# Patient Record
Sex: FEMALE | Race: WHITE | Employment: FULL TIME | URBAN - METROPOLITAN AREA
[De-identification: names, ages, dates, MRNs, and addresses within clinical notes are randomized per-mention and may not be internally consistent; named-entity substitution may affect disease eponyms.]

---

## 2017-03-13 ENCOUNTER — OFFICE VISIT (OUTPATIENT)
Dept: URGENT CARE | Facility: URGENT CARE | Age: 57
End: 2017-03-13
Payer: COMMERCIAL

## 2017-03-13 VITALS
DIASTOLIC BLOOD PRESSURE: 88 MMHG | OXYGEN SATURATION: 100 % | TEMPERATURE: 98.2 F | HEART RATE: 84 BPM | HEIGHT: 67 IN | BODY MASS INDEX: 26.53 KG/M2 | WEIGHT: 169 LBS | RESPIRATION RATE: 20 BRPM | SYSTOLIC BLOOD PRESSURE: 156 MMHG

## 2017-03-13 DIAGNOSIS — J01.01 ACUTE RECURRENT MAXILLARY SINUSITIS: ICD-10-CM

## 2017-03-13 DIAGNOSIS — J45.901 ASTHMA EXACERBATION: Primary | ICD-10-CM

## 2017-03-13 PROCEDURE — 99203 OFFICE O/P NEW LOW 30 MIN: CPT | Performed by: INTERNAL MEDICINE

## 2017-03-13 RX ORDER — LORATADINE 10 MG/1
10 TABLET ORAL DAILY
COMMUNITY
End: 2017-08-31 | Stop reason: ALTCHOICE

## 2017-03-13 RX ORDER — CEFDINIR 300 MG/1
300 CAPSULE ORAL 2 TIMES DAILY
Qty: 20 CAPSULE | Refills: 0 | Status: SHIPPED | OUTPATIENT
Start: 2017-03-13 | End: 2017-04-04

## 2017-03-13 RX ORDER — ALBUTEROL SULFATE 0.83 MG/ML
1 SOLUTION RESPIRATORY (INHALATION) EVERY 6 HOURS PRN
COMMUNITY
End: 2017-08-31

## 2017-03-13 RX ORDER — METHYLPREDNISOLONE 4 MG
TABLET, DOSE PACK ORAL
Qty: 21 TABLET | Refills: 0 | Status: SHIPPED | OUTPATIENT
Start: 2017-03-13 | End: 2017-04-04

## 2017-03-13 RX ORDER — IPRATROPIUM BROMIDE 42 UG/1
2 SPRAY, METERED NASAL 4 TIMES DAILY PRN
Qty: 1 BOX | Refills: 0 | Status: SHIPPED | OUTPATIENT
Start: 2017-03-13 | End: 2018-01-09

## 2017-03-13 ASSESSMENT — ENCOUNTER SYMPTOMS
SORE THROAT: 1
CHILLS: 0
SHORTNESS OF BREATH: 1
SINUS PRESSURE: 1
SPUTUM PRODUCTION: 1
HEMOPTYSIS: 0
WHEEZING: 1
COUGH: 1
FEVER: 0

## 2017-03-13 NOTE — MR AVS SNAPSHOT
After Visit Summary   3/13/2017    Ophelia Caal    MRN: [id number]           Patient Information     Date Of Birth          1960        Visit Information        Provider Department      3/13/2017 5:15 PM Tor Barrios MD [facility]        Today's Diagnoses     Asthma exacerbation    -  1    Acute recurrent maxillary sinusitis          Care Instructions    Establish with a regular medical provider and follow up if your symptoms persist/worsens, or if you develop any new symptoms or side effects from the medication/s.        Follow-ups after your visit        Your next 10 appointments already scheduled     Mar 14, 2017 12:20 PM CDT   SHORT with Corrine Kovacs MD   [facility]    [address] [place] [address]  [address]  [facility] [address] [phone]            Apr 27, 2017  9:45 AM CDT   PHYSICAL with Radha Akhtar MD   [facility] ([facility]    [address] [place] [address]  [address]  [facility] [address] [phone]              Who to contact     If you have questions or need follow up information about today's clinic visit or your schedule please contact [facility] directly at [phone].  Normal or non-critical lab and imaging results will be communicated to you by Holograamhart, letter or phone within 4 business days after the clinic has received the results. If you do not hear from us within 7 days, please contact the clinic through Holograamhart or phone. If you have a critical or abnormal lab result, we will notify you by phone as soon as possible.  Submit refill requests through DanceOn or call your pharmacy and they will forward the refill request to us. Please allow 3 business days for your refill to be completed.          Additional Information About Your Visit        Holograamhart Information     DanceOn lets you send messages to your doctor, view your test  "results, renew your prescriptions, schedule appointments and more. To sign up, go to [email or web address] . Click on \"Log in\" on the left side of the screen, which will take you to the Welcome page. Then click on \"Sign up Now\" on the right side of the page.     You will be asked to enter the access code listed below, as well as some personal information. Please follow the directions to create your username and password.     Your access code is: [email or web address]  Expires: 2017  5:28 PM     Your access code will  in 90 days. If you need help or a new code, please call your [place] clinic or [phone].        Care EveryWhere ID     This is your Care EveryWhere ID. This could be used by other organizations to access your [place] medical records  [id number]        Your Vitals Were     Pulse Temperature Respirations Height Pulse Oximetry BMI (Body Mass Index)    84 98.2  F (36.8  C) 20 5' 7\" (1.702 m) 100% 26.47 kg/m2       Blood Pressure from Last 3 Encounters:   17 156/88    Weight from Last 3 Encounters:   17 169 lb (76.7 kg)              Today, you had the following     No orders found for display         Today's Medication Changes          These changes are accurate as of: 3/13/17  5:28 PM.  If you have any questions, ask your nurse or doctor.               Start taking these medicines.        Dose/Directions    cefdinir 300 MG capsule   Commonly known as:  OMNICEF   Used for:  Acute recurrent maxillary sinusitis   Started by:  Tor Barrios MD        Dose:  300 mg   Take 1 capsule (300 mg) by mouth 2 times daily   Quantity:  20 capsule   Refills:  0       ipratropium 0.06 % spray   Commonly known as:  ATROVENT   Used for:  Acute recurrent maxillary sinusitis   Started by:  Tor Barrios MD        Dose:  2 spray   Spray 2 sprays into both nostrils 4 times daily as needed for rhinitis   Quantity:  1 Box   Refills:  0       methylPREDNISolone 4 MG " tablet   Commonly known as:  MEDROL DOSEPAK   Used for:  Asthma exacerbation   Started by:  Tor Barrios MD        Follow package instructions   Quantity:  21 tablet   Refills:  0            Where to get your medicines      These medications were sent to [id number]  [place] - [address]  [address] [address]     Phone:  [phone]     cefdinir 300 MG capsule    ipratropium 0.06 % spray    methylPREDNISolone 4 MG tablet                Primary Care Provider    None       No address on file        Thank you!     Thank you for choosing [facility]  for your care. Our goal is always to provide you with excellent care. Hearing back from our patients is one way we can continue to improve our services. Please take a few minutes to complete the written survey that you may receive in the mail after your visit with us. Thank you!             Your Updated Medication List - Protect others around you: Learn how to safely use, store and throw away your medicines at www.disposemymeds.org.          This list is accurate as of: 3/13/17  5:28 PM.  Always use your most recent med list.                   Brand Name Dispense Instructions for use    albuterol (2.5 MG/3ML) 0.083% neb solution      Take 1 vial by nebulization every 6 hours as needed for shortness of breath / dyspnea or wheezing       cefdinir 300 MG capsule    OMNICEF    20 capsule    Take 1 capsule (300 mg) by mouth 2 times daily       ipratropium 0.06 % spray    ATROVENT    1 Box    Spray 2 sprays into both nostrils 4 times daily as needed for rhinitis       loratadine 10 MG tablet    CLARITIN     Take 10 mg by mouth daily       methylPREDNISolone 4 MG tablet    MEDROL DOSEPAK    21 tablet    Follow package instructions       VITAMIN D (CHOLECALCIFEROL) PO      Take by mouth daily

## 2017-03-13 NOTE — PROGRESS NOTES
"HPI Comments:   Patient has a history of chronic allergic rhinitis that has not been responsive to nasal steroids, oral antihistamines.    Also has history of asthma; used to be on Advair but stopped.  Asthma was mainly exercise-induced until this course of present illness.    Sinus Problem    This is a new problem. Episode onset: 1 week. The problem has been gradually worsening. There has been no fever. The pain is mild. Associated symptoms include congestion, sinus pressure, sore throat, cough and shortness of breath. Pertinent negatives include no chills and no ear pain.       Past medical history: allergic rhinitis, asthma      Review of Systems   Constitutional: Negative for chills and fever.   HENT: Positive for congestion, sinus pressure and sore throat. Negative for ear pain.    Respiratory: Positive for cough, sputum production, shortness of breath and wheezing. Negative for hemoptysis.        /88  Pulse 84  Temp 98.2  F (36.8  C)  Resp 20  Ht 5' 7\" (1.702 m)  Wt 169 lb (76.7 kg)  SpO2 100%  BMI 26.47 kg/m2      Physical Exam   Constitutional: She is oriented to person, place, and time. No distress.   HENT:   Mouth/Throat: Oropharynx is clear and moist. No oropharyngeal exudate.   Severe nasal congestion   Cardiovascular: Normal rate, regular rhythm and normal heart sounds.    Pulmonary/Chest: Effort normal and breath sounds normal. No respiratory distress.   Lymphadenopathy:     She has no cervical adenopathy.   Neurological: She is alert and oriented to person, place, and time. GCS score is 15.   Vitals reviewed.        ICD-10-CM    1. Asthma exacerbation J45.901 methylPREDNISolone (MEDROL DOSEPAK) 4 MG tablet   2. Acute recurrent maxillary sinusitis J01.01 cefdinir (OMNICEF) 300 MG capsule     ipratropium (ATROVENT) 0.06 % spray       Patient Instructions   Establish with a regular medical provider and follow up if your symptoms persist/worsens, or if you develop any new symptoms or side effects " from the medication/s.

## 2017-03-13 NOTE — PATIENT INSTRUCTIONS
Establish with a regular medical provider and follow up if your symptoms persist/worsens, or if you develop any new symptoms or side effects from the medication/s.

## 2017-03-13 NOTE — NURSING NOTE
"Chief Complaint   Patient presents with     Urgent Care     Sinus Problem     pressure and drainage for over a month     Shortness of Breath     Cough      Initial /88  Pulse 84  Temp 98.2  F (36.8  C)  Resp 20  Ht 5' 7\" (1.702 m)  Wt 169 lb (76.7 kg)  SpO2 100%  BMI 26.47 kg/m2 Estimated body mass index is 26.47 kg/(m^2) as calculated from the following:    Height as of this encounter: 5' 7\" (1.702 m).    Weight as of this encounter: 169 lb (76.7 kg)..  BP completed using cuff size: regular  S KIMBERLY, CMA      "

## 2017-03-13 NOTE — PROGRESS NOTES
Sinus Problem    This is a new problem.         Past medical history: asthma      ROS      Physical Exam

## 2017-04-04 ENCOUNTER — OFFICE VISIT (OUTPATIENT)
Dept: PEDIATRICS | Facility: CLINIC | Age: 57
End: 2017-04-04
Payer: COMMERCIAL

## 2017-04-04 VITALS
WEIGHT: 167.6 LBS | DIASTOLIC BLOOD PRESSURE: 64 MMHG | HEART RATE: 85 BPM | HEIGHT: 67 IN | OXYGEN SATURATION: 96 % | SYSTOLIC BLOOD PRESSURE: 104 MMHG | BODY MASS INDEX: 26.3 KG/M2 | TEMPERATURE: 98.2 F

## 2017-04-04 DIAGNOSIS — J45.40 MODERATE PERSISTENT ASTHMA WITHOUT COMPLICATION: Primary | ICD-10-CM

## 2017-04-04 DIAGNOSIS — D23.5 BENIGN NEOPLASM OF SKIN OF TRUNK, EXCEPT SCROTUM: ICD-10-CM

## 2017-04-04 PROCEDURE — 99214 OFFICE O/P EST MOD 30 MIN: CPT | Performed by: NURSE PRACTITIONER

## 2017-04-04 RX ORDER — MONTELUKAST SODIUM 10 MG/1
10 TABLET ORAL AT BEDTIME
Qty: 30 TABLET | Refills: 1 | Status: SHIPPED | OUTPATIENT
Start: 2017-04-04 | End: 2017-11-28

## 2017-04-04 RX ORDER — ALBUTEROL SULFATE 90 UG/1
2 AEROSOL, METERED RESPIRATORY (INHALATION) EVERY 6 HOURS
Qty: 1 INHALER | Refills: 3 | Status: SHIPPED | OUTPATIENT
Start: 2017-04-04

## 2017-04-04 RX ORDER — ALBUTEROL SULFATE 90 UG/1
2 AEROSOL, METERED RESPIRATORY (INHALATION) EVERY 6 HOURS
COMMUNITY
End: 2017-04-04

## 2017-04-04 RX ORDER — FLUTICASONE PROPIONATE 50 MCG
1 SPRAY, SUSPENSION (ML) NASAL DAILY
COMMUNITY

## 2017-04-04 NOTE — PROGRESS NOTES
"  SUBJECTIVE:                                                    Ophelia Caal is a 56 year old female who presents to clinic today for the following health issues:    Patient has some moles on her back she would like looked at  AND  ED/UC Followup:    Facility:  [facility]  Date of visit: 3/13/17  Reason for visit: asthma exacerbation  Current Status: Patient states would like advair inhaler - was on previously and worked well  Gets nosebleeds with flonase, states asthma seems better with prednisone     Feeling much better after using the prednisone. Would like to get back on ICS, as it used to work wonders. Didn't get flares when she was on it. Triggers include infection, pets, allergies.     Has a mole on her back she would like looked at.    ROS: const/heent/resp/derm otherwise negative     OBJECTIVE:  /64 (Cuff Size: Adult Regular)  Pulse 85  Temp 98.2  F (36.8  C) (Tympanic)  Ht 5' 7\" (1.702 m)  Wt 167 lb 9.6 oz (76 kg)  SpO2 96%  BMI 26.25 kg/m2  CONSTITUTIONAL: Alert, well-nourished, well-groomed, NAD  RESP: Lungs CTA. No wheeze, rhonchi, rales.  CV: HRRR S1 S2 No MRG. No peripheral edema  DERM: 1cm x 1cm flesh colored and darker brown colored mole on back. Heterogeneous. Not bleeding.     ASSESSMENT/PLAN:  (J45.40) Moderate persistent asthma without complication  (primary encounter diagnosis)  Comment: Recently treated for exacerbation. Had not been taking her controller. Allergies not well controlled as she is not on flonase.   Plan: fluticasone (FLONASE) 50 MCG/ACT spray,         mometasone-formoterol (DULERA) 200-5 MCG/ACT         oral inhaler, albuterol (PROAIR HFA/PROVENTIL         HFA/VENTOLIN HFA) 108 (90 BASE) MCG/ACT         Inhaler, montelukast (SINGULAIR) 10 MG tablet,         DISCONTINUED: albuterol (PROAIR HFA/PROVENTIL         HFA/VENTOLIN HFA) 108 (90 BASE) MCG/ACT         Inhaler, DISCONTINUED: fluticasone-salmeterol         (ADVAIR) 250-50 MCG/DOSE diskus inhaler        " Will trial Singulair for allergies and asthma. If asthma not significantly better in a few weeks with the singulair will have her start Qvar. Discussed reasons to seek care/start Qvar sooner.         F/U 1 month.     (D23.5) Benign neoplasm of skin of trunk, except scrotum  Comment: Unclear type of mole. Needs biopsy.   Plan: DERMATOLOGY REFERRAL              Lien Hwoard, [place].

## 2017-04-04 NOTE — MR AVS SNAPSHOT
"              After Visit Summary   4/4/2017    Ophelia Caal    MRN: [id number]           Patient Information     Date Of Birth          1960        Visit Information        Provider Department      4/4/2017 4:00 PM Lien Howard APRN CNP [facility]        Today's Diagnoses     Moderate persistent asthma without complication    -  1    Benign neoplasm of skin of trunk, except scrotum          Care Instructions    1.  albuterol and singulair  2. If asthma not better in 1 week  the Qvar.   3. If Qvar isn't covered, ask insurance which \"inhaled corticosteroid-laba combination\" is covered. Then call me        Follow-ups after your visit        Additional Services     DERMATOLOGY REFERRAL       Your provider [place] [facility] [phone]     Please be aware that coverage of these services is subject to the terms and limitations of your health insurance plan.  Call member services at your health plan with any benefit or coverage questions.      Please bring the following with you to your appointment:    (1) Any X-Rays, CTs or MRIs which have been performed.  Contact the facility where they were done to arrange for  prior to your scheduled appointment.    (2) List of current medications  (3) This referral request   (4) Any documents/labs given to you for this referral                  Your next 10 appointments already scheduled     Apr 27, 2017  9:45 AM CDT   PHYSICAL with Radha Akhtar MD   [facility]    [address]  [address]  [facility] [address] [phone]              Who to contact     If you have questions or need follow up information about today's clinic visit or your schedule please contact [facility] directly at [phone].  Normal or non-critical lab and imaging results will be communicated to you by MyChart, letter or phone within 4 business days " "after the clinic has received the results. If you do not hear from us within 7 days, please contact the clinic through YOOWALK or phone. If you have a critical or abnormal lab result, we will notify you by phone as soon as possible.  Submit refill requests through YOOWALK or call your pharmacy and they will forward the refill request to us. Please allow 3 business days for your refill to be completed.          Additional Information About Your Visit        YOOWALK Information     YOOWALK lets you send messages to your doctor, view your test results, renew your prescriptions, schedule appointments and more. To sign up, go to [email or web address] . Click on \"Log in\" on the left side of the screen, which will take you to the Welcome page. Then click on \"Sign up Now\" on the right side of the page.     You will be asked to enter the access code listed below, as well as some personal information. Please follow the directions to create your username and password.     Your access code is: [email or web address]  Expires: 2017  5:28 PM     Your access code will  in 90 days. If you need help or a new code, please call your [place] clinic or [phone].        Care EveryWhere ID     This is your Care EveryWhere ID. This could be used by other organizations to access your [place] medical records  [id number]        Your Vitals Were     Pulse Temperature Height Pulse Oximetry BMI (Body Mass Index)       85 98.2  F (36.8  C) (Tympanic) 5' 7\" (1.702 m) 96% 26.25 kg/m2        Blood Pressure from Last 3 Encounters:   17 104/64   17 156/88    Weight from Last 3 Encounters:   17 167 lb 9.6 oz (76 kg)   17 169 lb (76.7 kg)              We Performed the Following     DERMATOLOGY REFERRAL          Today's Medication Changes          These changes are accurate as of: 17  4:26 PM.  If you have any questions, ask your nurse or doctor.               Start taking these medicines.        Dose/Directions    " mometasone-formoterol 200-5 MCG/ACT oral inhaler   Commonly known as:  DULERA   Used for:  Moderate persistent asthma without complication   Started by:  Lien Howard APRN CNP        Dose:  2 puff   Inhale 2 puffs into the lungs 2 times daily   Quantity:  8.8 g   Refills:  3       montelukast 10 MG tablet   Commonly known as:  SINGULAIR   Used for:  Moderate persistent asthma without complication   Started by:  Lien Howard APRN CNP        Dose:  10 mg   Take 1 tablet (10 mg) by mouth At Bedtime   Quantity:  30 tablet   Refills:  1            Where to get your medicines      These medications were sent to [id number]  [place] - [address]  [address] [place] [address]     Phone:  [phone]     albuterol 108 (90 BASE) MCG/ACT Inhaler    mometasone-formoterol 200-5 MCG/ACT oral inhaler    montelukast 10 MG tablet                Primary Care Provider    None       No address on file        Thank you!     Thank you for choosing [facility]  for your care. Our goal is always to provide you with excellent care. Hearing back from our patients is one way we can continue to improve our services. Please take a few minutes to complete the written survey that you may receive in the mail after your visit with us. Thank you!             Your Updated Medication List - Protect others around you: Learn how to safely use, store and throw away your medicines at www.disposemymeds.org.          This list is accurate as of: 4/4/17  4:26 PM.  Always use your most recent med list.                   Brand Name Dispense Instructions for use    * albuterol (2.5 MG/3ML) 0.083% neb solution      Take 1 vial by nebulization every 6 hours as needed for shortness of breath / dyspnea or wheezing Reported on 4/4/2017       * albuterol 108 (90 BASE) MCG/ACT Inhaler    PROAIR HFA/PROVENTIL HFA/VENTOLIN HFA    1 Inhaler    Inhale 2 puffs into the lungs every 6 hours        fluticasone 50 MCG/ACT spray    FLONASE     Spray 1 spray into both nostrils daily       ipratropium 0.06 % spray    ATROVENT    1 Box    Spray 2 sprays into both nostrils 4 times daily as needed for rhinitis       loratadine 10 MG tablet    CLARITIN     Take 10 mg by mouth daily       mometasone-formoterol 200-5 MCG/ACT oral inhaler    DULERA    8.8 g    Inhale 2 puffs into the lungs 2 times daily       montelukast 10 MG tablet    SINGULAIR    30 tablet    Take 1 tablet (10 mg) by mouth At Bedtime       VITAMIN D (CHOLECALCIFEROL) PO      Take by mouth daily       * Notice:  This list has 2 medication(s) that are the same as other medications prescribed for you. Read the directions carefully, and ask your doctor or other care provider to review them with you.

## 2017-04-04 NOTE — NURSING NOTE
"Chief Complaint   Patient presents with     RECHECK     UC follow up       Initial /64 (Cuff Size: Adult Regular)  Pulse 85  Temp 98.2  F (36.8  C) (Tympanic)  Ht 5' 7\" (1.702 m)  Wt 167 lb 9.6 oz (76 kg)  SpO2 96%  BMI 26.25 kg/m2 Estimated body mass index is 26.25 kg/(m^2) as calculated from the following:    Height as of this encounter: 5' 7\" (1.702 m).    Weight as of this encounter: 167 lb 9.6 oz (76 kg).  Medication Reconciliation: complete   Shelby Villa, VIC    "

## 2017-04-04 NOTE — PATIENT INSTRUCTIONS
"1.  albuterol and singulair  2. If asthma not better in 1 week  the Qvar.   3. If Qvar isn't covered, ask insurance which \"inhaled corticosteroid-laba combination\" is covered. Then call me  "

## 2017-04-05 ASSESSMENT — ASTHMA QUESTIONNAIRES: ACT_TOTALSCORE: 14

## 2017-04-11 PROBLEM — J45.40 MODERATE PERSISTENT ASTHMA WITHOUT COMPLICATION: Status: ACTIVE | Noted: 2017-04-04

## 2017-04-11 PROBLEM — J45.40 MODERATE PERSISTENT ASTHMA WITHOUT COMPLICATION: Status: ACTIVE | Noted: 2017-04-11

## 2017-04-21 ENCOUNTER — TELEPHONE (OUTPATIENT)
Dept: PEDIATRICS | Facility: CLINIC | Age: 57
End: 2017-04-21

## 2017-04-21 DIAGNOSIS — J45.40 MODERATE PERSISTENT ASTHMA WITHOUT COMPLICATION: Primary | ICD-10-CM

## 2017-04-21 NOTE — LETTER
My Asthma Action Plan  Name: Ophelia Caal   YOB: 1960  Date: 4/21/2017   My doctor: KENNEDY Ruby CNP   My clinic: [facility]        My Control Medicine:   My Rescue Medicine:    My Asthma Severity:   Avoid your asthma triggers:                GREEN ZONE     Good Control    I feel good    No cough or wheeze    Can work, sleep and play without asthma symptoms       Take your asthma control medicine every day.     1. If exercise triggers your asthma, take your rescue medication    15 minutes before exercise or sports, and    During exercise if you have asthma symptoms  2. Spacer to use with inhaler: If you have a spacer, make sure to use it with your inhaler             YELLOW ZONE     Getting Worse  I have ANY of these:    I do not feel good    Cough or wheeze    Chest feels tight    Wake up at night   1. Keep taking your Green Zone medications  2. Start taking your rescue medicine:    every 20 minutes for up to 1 hour. Then every 4 hours for 24-48 hours.  3. If you stay in the Yellow Zone for more than 12-24 hours, contact your doctor.  4. If you do not return to the Green Zone in 12-24 hours or you get worse, start taking your oral steroid medicine if prescribed by your provider.           RED ZONE     Medical Alert - Get Help  I have ANY of these:    I feel awful    Medicine is not helping    Breathing getting harder    Trouble walking or talking    Nose opens wide to breathe       1. Take your rescue medicine NOW  2. If your provider has prescribed an oral steroid medicine, start taking it NOW  3. Call your doctor NOW  4. If you are still in the Red Zone after 20 minutes and you have not reached your doctor:    Take your rescue medicine again and    Call 911 or go to the emergency room right away    See your regular doctor within 2 weeks of an Emergency Room or Urgent Care visit for follow-up treatment.        Electronically signed by: Shelby Villa, April 21,  2017    Annual Reminders:  Meet with Asthma Educator,  Flu Shot in the Fall, consider Pneumonia Vaccination for patients with asthma (aged 19 and older).    Pharmacy: [facility]  [place] - [address]                    Asthma Triggers  How To Control Things That Make Your Asthma Worse    Triggers are things that make your asthma worse.  Look at the list below to help you find your triggers and what you can do about them.  You can help prevent asthma flare-ups by staying away from your triggers.      Trigger                                                          What you can do   Cigarette Smoke  Tobacco smoke can make asthma worse. Do not allow smoking in your home, car or around you.  Be sure no one smokes at a child s day care or school.  If you smoke, ask your health care provider for ways to help you quit.  Ask family members to quit too.  Ask your health care provider for a referral to Quit Plan to help you quit smoking, or call 1-940-034-PLAN.     Colds, Flu, Bronchitis  These are common triggers of asthma. Wash your hands often.  Don t touch your eyes, nose or mouth.  Get a flu shot every year.     Dust Mites  These are tiny bugs that live in cloth or carpet. They are too small to see. Wash sheets and blankets in hot water every week.   Encase pillows and mattress in dust mite proof covers.  Avoid having carpet if you can. If you have carpet, vacuum weekly.   Use a dust mask and HEPA vacuum.   Pollen and Outdoor Mold  Some people are allergic to trees, grass, or weed pollen, or molds. Try to keep your windows closed.  Limit time out doors when pollen count is high.   Ask you health care provider about taking medicine during allergy season.     Animal Dander  Some people are allergic to skin flakes, urine or saliva from pets with fur or feathers. Keep pets with fur or feathers out of your home.    If you can t keep the pet outdoors, then keep the pet out of your bedroom.  Keep  the bedroom door closed.  Keep pets off cloth furniture and away from stuffed toys.     Mice, Rats, and Cockroaches  Some people are allergic to the waste from these pests.   Cover food and garbage.  Clean up spills and food crumbs.  Store grease in the refrigerator.   Keep food out of the bedroom.   Indoor Mold  This can be a trigger if your home has high moisture. Fix leaking faucets, pipes, or other sources of water.   Clean moldy surfaces.  Dehumidify basement if it is damp and smelly.   Smoke, Strong Odors, and Sprays  These can reduce air quality. Stay away from strong odors and sprays, such as perfume, powder, hair spray, paints, smoke incense, paint, cleaning products, candles and new carpet.   Exercise or Sports  Some people with asthma have this trigger. Be active!  Ask your doctor about taking medicine before sports or exercise to prevent symptoms.    Warm up for 5-10 minutes before and after sports or exercise.     Other Triggers of Asthma  Cold air:  Cover your nose and mouth with a scarf.  Sometimes laughing or crying can be a trigger.  Some medicines and food can trigger asthma.

## 2017-04-21 NOTE — TELEPHONE ENCOUNTER
Panel Management Review      Patient has the following on her problem list:     Asthma review     ACT Total Scores 4/4/2017   ACT TOTAL SCORE (Goal Greater than or Equal to 20) 14   In the past 12 months, how many times did you visit the emergency room for your asthma without being admitted to the hospital? 0   In the past 12 months, how many times were you hospitalized overnight because of your asthma? 0      1. Is Asthma diagnosis on the Problem List? Yes    2. Is Asthma listed on Health Maintenance? Yes    3. Patient is due for:  AAP      Composite cancer screening  Chart review shows that this patient is due/due soon for the following Mammogram and Colonoscopy  Summary:    Patient is due/failing the following:   AAP, COLONOSCOPY and MAMMOGRAM    Action needed:   Patient needs office visit for mammogram, colonoscopy.    Type of outreach:    Phone, spoke to patient.  Per chart notes and records received mammogram done at [facility] March 2016 - nomral, RTC 1 year. Colonoscopy per patient report done about 2012 - normal RTC in 10 yr.  AAP sent to provider to sign, letter mailed to patient.    Questions for provider review:    None                                                                                     Shelby Villa CMA    Chart closed .

## 2017-04-27 ENCOUNTER — OFFICE VISIT (OUTPATIENT)
Dept: OBGYN | Facility: CLINIC | Age: 57
End: 2017-04-27
Payer: COMMERCIAL

## 2017-04-27 VITALS
DIASTOLIC BLOOD PRESSURE: 84 MMHG | HEART RATE: 84 BPM | SYSTOLIC BLOOD PRESSURE: 128 MMHG | WEIGHT: 166 LBS | BODY MASS INDEX: 26 KG/M2

## 2017-04-27 DIAGNOSIS — I78.1 NEVUS, NON-NEOPLASTIC: ICD-10-CM

## 2017-04-27 DIAGNOSIS — K59.00 CONSTIPATION, UNSPECIFIED CONSTIPATION TYPE: ICD-10-CM

## 2017-04-27 DIAGNOSIS — Z01.419 ENCOUNTER FOR GYNECOLOGICAL EXAMINATION WITHOUT ABNORMAL FINDING: Primary | ICD-10-CM

## 2017-04-27 DIAGNOSIS — N95.1 SYMPTOMATIC MENOPAUSAL OR FEMALE CLIMACTERIC STATES: ICD-10-CM

## 2017-04-27 PROCEDURE — 99396 PREV VISIT EST AGE 40-64: CPT | Performed by: OBSTETRICS & GYNECOLOGY

## 2017-04-27 RX ORDER — ESTRADIOL 0.05 MG/D
1 PATCH, EXTENDED RELEASE TRANSDERMAL
Qty: 24 PATCH | Refills: 3 | Status: SHIPPED | OUTPATIENT
Start: 2017-04-27 | End: 2018-01-09

## 2017-04-27 NOTE — MR AVS SNAPSHOT
After Visit Summary   4/27/2017    Ophelia Caal    MRN: [id number]           Patient Information     Date Of Birth          1960        Visit Information        Provider Department      4/27/2017 9:45 AM Radha Akhtar MD [facility] [place]        Today's Diagnoses     Constipation, unspecified constipation type    -  1    Nevus, non-neoplastic        Symptomatic menopausal or female climacteric states        Encounter for gynecological examination without abnormal finding           Follow-ups after your visit        Additional Services     DERMATOLOGY REFERRAL       Your provider has referred you to: [facility]: [facility] - [place]    Please be aware that coverage of these services is subject to the terms and limitations of your health insurance plan.  Call member services at your health plan with any benefit or coverage questions.      Please bring the following with you to your appointment:    (1) Any X-Rays, CTs or MRIs which have been performed.  Contact the facility where they were done to arrange for  prior to your scheduled appointment.    (2) List of current medications  (3) This referral request   (4) Any documents/labs given to you for this referral            GASTROENTEROLOGY ADULT REF CONSULT ONLY       Preferred Location: [place] [facility] [phone]      Please be aware that coverage of these services is subject to the terms and limitations of your health insurance plan.  Call member services at your health plan with any benefit or coverage questions.  Any procedures must be performed at a [facility] facility OR coordinated by your clinic's referral office.    Please bring the following with you to your appointment:    (1) Any X-Rays, CTs or MRIs which have been performed.  Contact the facility where they were done to arrange for  prior to your scheduled appointment.    (2) List of current medications   (3) This referral request   (4) Any documents/labs  "given to you for this referral                  Future tests that were ordered for you today     Open Future Orders        Priority Expected Expires Ordered    Comprehensive metabolic panel Routine  2018    Lipid Profile Routine  2018    TSH with free T4 reflex Routine  2018    Hepatitis C antibody Routine  2018    MA Screen Bilateral w/Chapo Routine  2018            Who to contact     If you have questions or need follow up information about today's clinic visit or your schedule please contact [facility] [place] directly at [phone].  Normal or non-critical lab and imaging results will be communicated to you by MyChart, letter or phone within 4 business days after the clinic has received the results. If you do not hear from us within 7 days, please contact the clinic through Whisbihart or phone. If you have a critical or abnormal lab result, we will notify you by phone as soon as possible.  Submit refill requests through Global Active or call your pharmacy and they will forward the refill request to us. Please allow 3 business days for your refill to be completed.          Additional Information About Your Visit        WhisbiharStARTinitiative Information     Global Active lets you send messages to your doctor, view your test results, renew your prescriptions, schedule appointments and more. To sign up, go to [email or web address] . Click on \"Log in\" on the left side of the screen, which will take you to the Welcome page. Then click on \"Sign up Now\" on the right side of the page.     You will be asked to enter the access code listed below, as well as some personal information. Please follow the directions to create your username and password.     Your access code is: [email or web address]  Expires: 2017  5:28 PM     Your access code will  in 90 days. If you need help or a new code, please call your [place] clinic or [phone].        Care EveryWhere ID     " This is your Care EveryWhere ID. This could be used by other organizations to access your [facility] medical records  [id number]        Your Vitals Were     Pulse BMI (Body Mass Index)                84 26 kg/m2           Blood Pressure from Last 3 Encounters:   04/27/17 128/84   04/04/17 104/64   03/13/17 156/88    Weight from Last 3 Encounters:   04/27/17 166 lb (75.3 kg)   04/04/17 167 lb 9.6 oz (76 kg)   03/13/17 169 lb (76.7 kg)              We Performed the Following     DERMATOLOGY REFERRAL     GASTROENTEROLOGY ADULT REF CONSULT ONLY          Today's Medication Changes          These changes are accurate as of: 4/27/17 10:35 AM.  If you have any questions, ask your nurse or doctor.               Start taking these medicines.        Dose/Directions    estradiol 0.05 MG/24HR BIW patch   Commonly known as:  VIVELLE-DOT   Used for:  Symptomatic menopausal or female climacteric states   Started by:  Radha Akhtar MD        Dose:  1 patch   Place 1 patch onto the skin twice a week   Quantity:  24 patch   Refills:  3       progesterone 100 MG capsule   Commonly known as:  PROMETRIUM   Used for:  Symptomatic menopausal or female climacteric states   Started by:  Radha Akhtar MD        Dose:  100 mg   Take 1 capsule (100 mg) by mouth daily   Quantity:  90 capsule   Refills:  3            Where to get your medicines      These medications were sent to [facility] [place] - [address]  [address] [facility] [place] [address]     Phone:  [phone]     estradiol 0.05 MG/24HR BIW patch    progesterone 100 MG capsule                Primary Care Provider Office Phone # Fax #    KENNEDY Ruby [facility] [phone]       [facility] [address] [place] [address]        Thank you!     Thank you for choosing [facility]  for your care. Our goal is always to provide you with excellent care. Hearing back from our  patients is one way we can continue to improve our services. Please take a few minutes to complete the written survey that you may receive in the mail after your visit with us. Thank you!             Your Updated Medication List - Protect others around you: Learn how to safely use, store and throw away your medicines at www.disposemymeds.org.          This list is accurate as of: 4/27/17 10:35 AM.  Always use your most recent med list.                   Brand Name Dispense Instructions for use    * albuterol (2.5 MG/3ML) 0.083% neb solution      Take 1 vial by nebulization every 6 hours as needed for shortness of breath / dyspnea or wheezing Reported on 4/4/2017       * albuterol 108 (90 BASE) MCG/ACT Inhaler    PROAIR HFA/PROVENTIL HFA/VENTOLIN HFA    1 Inhaler    Inhale 2 puffs into the lungs every 6 hours       estradiol 0.05 MG/24HR BIW patch    VIVELLE-DOT    24 patch    Place 1 patch onto the skin twice a week       fluticasone 50 MCG/ACT spray    FLONASE     Spray 1 spray into both nostrils daily       fluticasone-salmeterol 250-50 MCG/DOSE diskus inhaler    ADVAIR    3 Inhaler    Inhale 1 puff into the lungs 2 times daily       ipratropium 0.06 % spray    ATROVENT    1 Box    Spray 2 sprays into both nostrils 4 times daily as needed for rhinitis       loratadine 10 MG tablet    CLARITIN     Take 10 mg by mouth daily       mometasone-formoterol 200-5 MCG/ACT oral inhaler    DULERA    8.8 g    Inhale 2 puffs into the lungs 2 times daily       montelukast 10 MG tablet    SINGULAIR    30 tablet    Take 1 tablet (10 mg) by mouth At Bedtime       progesterone 100 MG capsule    PROMETRIUM    90 capsule    Take 1 capsule (100 mg) by mouth daily       VITAMIN D (CHOLECALCIFEROL) PO      Take by mouth daily       * Notice:  This list has 2 medication(s) that are the same as other medications prescribed for you. Read the directions carefully, and ask your doctor or other care provider to review them with you.

## 2017-04-27 NOTE — PROGRESS NOTES
HPI: Ophelia Caal is a 56 year old female     Obstetric History       T2      TAB0   SAB0   E0   M0   L3     No LMP recorded. Patient has had a hysterectomy.     who presents today because she is due for a mammogram. Overall, patient is doing well. She is gaining weight. Has seasonal allergies. Concerns today include: Discomfort on the right sided upper abdomen towards the anterior aspect. She has had this discomfort for months, intermittently. Symptoms include pressure, feeling bloated and fullness. Denies any pain. Relief with laying down and sitting.    She also has concerns regarding her bowel movements. Onset: long time ago, however worsened in the last several months. Patient reports she will have diarrhea for a few days, then no bowel movements. She has tried Mirelax as well as other natural laxatives with some, however not complete relief. Puts fiber supplements in her tea. Denies symptoms of jaundice. Does have stool color change, however, per patient, it is normal for her.     History of surgical removal of an intraductal papilloma. Patient reports it was benign. She had surgery early  due to blood discharge from her nipple. Since surgery, pre-operative symptoms have resolved. Does have some tenderness of breast, however is mild.     Other concerns include: Mild insomnia, worsened recently. Multiple moles on her back. Borderline high cholesterol. Hot flashes/night sweats     PGYNH: Vaginal hysterectomy around age 46. Then 2 years later had ovaries removal.   Denies history of hormonal replacement therapy.     Last coloscopy in . History of hyperthyroidism as a child.    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     Social History Main Topics     Smoking status: Never Smoker     Smokeless tobacco: Never Used     Alcohol use Yes     Drug use: No     Sexual activity: Yes     Partners: Male      Comment: hysterectomy     Other Topics Concern     Not on file     Social History Narrative     This document serves as a record of the services and decisions personally performed and made by Radha Akhtar MD. It was created on his behalf by Nancy Hutchison, a trained medical scribe. The creation of this document is based the provider's statements to the medical scribe.    Scribe Nancy Hutchison 12:01 PM 4/27/2017     Review of Systems:   CONSTITUTIONAL:NEGATIVE for fever, chills, change in weight  INTEGUMENTARY/SKIN: request derm referral for multiple nevi   RESP:NEGATIVE for significant cough or SOB  BREAST: NEGATIVE for masses, tenderness or discharge  CV: NEGATIVE for chest pain, palpitations or peripheral edema  GI: NEGATIVE for abdominal pain, heartburn, nausea, emesis. Abnormal bowel movements as stated in HPI.  : negative for, dysuria, vaginal discharge and bleeding  MUSCULOSKELETAL: NEGATIVE for significant arthralgias or myalgia  NEURO: NEGATIVE for weakness, dizziness or paresthesias  PSYCHIATRIC: NEGATIVE for changes in mood or affect       Physical exam:  GENERAL APPEARANCE: healthy, alert and no distress  NECK: no adenopathy, no asymmetry, masses, or scars and thyroid normal to palpation  RESP: lungs clear to auscultation - no rales, rhonchi or wheezes  BREAST: normal without masses, tenderness or nipple discharge and no palpable  axillary masses or adenopathy; post surgical right sided scar on the left breast, retracted, healed well.   CV: regular rates and rhythm, normal S1 S2, no S3 or S4 and no murmur, click or rub -  ABDOMEN:  soft, nontender, no HSM or masses and bowel sounds normal; surgical scar from mole removal on the right side.  PELVIC:   Vulva: normal external female genitalia,   Urethra meatus: normal, non-tender  Vagina: normal vaginal mucosa, rugated, no lesions, normal physiologic discharge.    Cervix: multi parous cervix, no lesions.    Uterus: Normal contour, size, position; non-tender  Adnexa: No adnexal masses palpated, non-tender  Perineum: normal, no lesions, intact  Anus: normal, no lesions, hemorrhoids      Assessment/Plan:  (Z01.419) Encounter for gynecological examination without abnormal finding  (primary encounter diagnosis)  Comment:     Plan: Comprehensive metabolic panel, Lipid Profile,         TSH with free T4 reflex, Hepatitis C antibody,         MA Screen Bilateral w/Chapo             (K59.00) Constipation, unspecified constipation type  Comment:  Reviewed measures for management for constipation; referral offered for further management.  Plan: GASTROENTEROLOGY ADULT REF CONSULT ONLY             (I78.1) Nevus, non-neoplastic  Comment:    Plan: DERMATOLOGY REFERRAL             (N95.1) Symptomatic menopausal or female climacteric states  Comment:  Desires treatment for hot flashes, night sweats.   Treatment options, to include HRT vs alternative pharmaceuticals discussed; patient desires HRT.   Discussed risks to include possible increased risk of breast CA, stroke, heart disease.    Recommend regimen of transdermal estradiol with micronized progesterone for improved safety profile.    Recommend for limited duration, lowest effective dose discussed.  Patient desires treatment.  Plan: estradiol (VIVELLE-DOT) 0.05 MG/24HR BIW patch,        progesterone (PROMETRIUM) 100 MG capsule           PE: reviewed health  maintenance including diet, regular exercise and annual exams      The information in this document, created by a scribe for me, accurately reflects the services I personally performed and the decisions made by me. I have reviewed and approved this document for accuracy.

## 2017-04-27 NOTE — NURSING NOTE
HEALTH CARE MAINTENANCE:  ========================  Ever had an abnormal pap? No  Menses are every NA days; lasting for NA days.  Flow is   NA.  Menstrual Pain? NA PMS symptoms? NA  Have you had a pneumonia shot? Not applicable  How many dairy products do you eat daily? 1-2  Have you had an eye exam in the past year? YES  Health Maintenance Reviewed:  Health Maintenance   Topic Date Due     ADVANCE DIRECTIVE PLANNING Q5 YRS (NO INBASKET)  08/21/1979     HEPATITIS C SCREENING  08/21/1979     LIPID SCREEN Q5 YR FEMALE (SYSTEM ASSIGNED)  08/21/2006     INFLUENZA VACCINE (SYSTEM ASSIGNED)  09/01/2017     ASTHMA CONTROL TEST Q6 MOS (NO INBASKET)  10/04/2017     MAMMO SCREEN Q2 YR (SYSTEM ASSIGNED)  03/01/2018     ASTHMA ACTION PLAN Q1 YR (NO INBASKET)  04/21/2018     TETANUS IMMUNIZATION (SYSTEM ASSIGNED)  11/13/2019     COLON CANCER SCREEN (SYSTEM ASSIGNED)  01/01/2022       SAFETY:  =======  Do you exercise? YES       If yes, how many times per week? Walking 5 days a week  Do you feel safe in your relationship(s)? YES  Do you have a gun in your home? N/A   Do you wear your seatbelt regularly? YES  Do you use sunscreen? YES    Are you fasting today? No    Mammogram 03/01/16    Nurse assisted visit.  Indu Dimas MA.

## 2017-04-28 DIAGNOSIS — Z01.419 ENCOUNTER FOR GYNECOLOGICAL EXAMINATION WITHOUT ABNORMAL FINDING: ICD-10-CM

## 2017-04-28 LAB — HCV AB SERPL QL IA: NORMAL

## 2017-04-28 PROCEDURE — 80053 COMPREHEN METABOLIC PANEL: CPT | Performed by: OBSTETRICS & GYNECOLOGY

## 2017-04-28 PROCEDURE — 86803 HEPATITIS C AB TEST: CPT | Performed by: OBSTETRICS & GYNECOLOGY

## 2017-04-28 PROCEDURE — 80061 LIPID PANEL: CPT | Performed by: OBSTETRICS & GYNECOLOGY

## 2017-04-28 PROCEDURE — 84443 ASSAY THYROID STIM HORMONE: CPT | Performed by: OBSTETRICS & GYNECOLOGY

## 2017-04-28 PROCEDURE — 36415 COLL VENOUS BLD VENIPUNCTURE: CPT | Performed by: OBSTETRICS & GYNECOLOGY

## 2017-04-29 LAB
ALBUMIN SERPL-MCNC: 4.5 G/DL (ref 3.4–5)
ALP SERPL-CCNC: 84 U/L (ref 40–150)
ALT SERPL W P-5'-P-CCNC: 26 U/L (ref 0–50)
ANION GAP SERPL CALCULATED.3IONS-SCNC: 8 MMOL/L (ref 3–14)
AST SERPL W P-5'-P-CCNC: 14 U/L (ref 0–45)
BILIRUB SERPL-MCNC: 0.9 MG/DL (ref 0.2–1.3)
BUN SERPL-MCNC: 15 MG/DL (ref 7–30)
CALCIUM SERPL-MCNC: 10.1 MG/DL (ref 8.5–10.1)
CHLORIDE SERPL-SCNC: 106 MMOL/L (ref 94–109)
CHOLEST SERPL-MCNC: 326 MG/DL
CO2 SERPL-SCNC: 26 MMOL/L (ref 20–32)
CREAT SERPL-MCNC: 0.78 MG/DL (ref 0.52–1.04)
GFR SERPL CREATININE-BSD FRML MDRD: 76 ML/MIN/1.7M2
GLUCOSE SERPL-MCNC: 93 MG/DL (ref 70–99)
HDLC SERPL-MCNC: 56 MG/DL
LDLC SERPL CALC-MCNC: 201 MG/DL
NONHDLC SERPL-MCNC: 270 MG/DL
POTASSIUM SERPL-SCNC: 4.3 MMOL/L (ref 3.4–5.3)
PROT SERPL-MCNC: 8.4 G/DL (ref 6.8–8.8)
SODIUM SERPL-SCNC: 140 MMOL/L (ref 133–144)
TRIGL SERPL-MCNC: 347 MG/DL
TSH SERPL DL<=0.005 MIU/L-ACNC: 1.68 MU/L (ref 0.4–4)

## 2017-08-07 ENCOUNTER — OFFICE VISIT (OUTPATIENT)
Dept: URGENT CARE | Facility: URGENT CARE | Age: 57
End: 2017-08-07
Payer: COMMERCIAL

## 2017-08-07 VITALS
RESPIRATION RATE: 22 BRPM | SYSTOLIC BLOOD PRESSURE: 170 MMHG | DIASTOLIC BLOOD PRESSURE: 100 MMHG | BODY MASS INDEX: 26.94 KG/M2 | HEART RATE: 82 BPM | OXYGEN SATURATION: 98 % | WEIGHT: 172 LBS

## 2017-08-07 DIAGNOSIS — J45.901 ASTHMA EXACERBATION: Primary | ICD-10-CM

## 2017-08-07 DIAGNOSIS — R03.0 ELEVATED BLOOD PRESSURE READING: ICD-10-CM

## 2017-08-07 DIAGNOSIS — J30.2 CHRONIC SEASONAL ALLERGIC RHINITIS, UNSPECIFIED TRIGGER: ICD-10-CM

## 2017-08-07 PROCEDURE — 99214 OFFICE O/P EST MOD 30 MIN: CPT | Performed by: INTERNAL MEDICINE

## 2017-08-07 RX ORDER — METHYLPREDNISOLONE 4 MG
TABLET, DOSE PACK ORAL
Qty: 21 TABLET | Refills: 0 | Status: SHIPPED | OUTPATIENT
Start: 2017-08-07 | End: 2017-11-28

## 2017-08-07 ASSESSMENT — ENCOUNTER SYMPTOMS
SPUTUM PRODUCTION: 0
FOCAL WEAKNESS: 0
VOMITING: 0
DIARRHEA: 0
MYALGIAS: 0
DIZZINESS: 0
SORE THROAT: 1
WHEEZING: 1
FEVER: 0
CHILLS: 0
NAUSEA: 0
SENSORY CHANGE: 0
COUGH: 1
SHORTNESS OF BREATH: 1
HEMOPTYSIS: 0
HEADACHES: 0

## 2017-08-07 NOTE — MR AVS SNAPSHOT
"              After Visit Summary   8/7/2017    Ophelia Caal    MRN: [id number]           Patient Information     Date Of Birth          1960        Visit Information        Provider Department      8/7/2017 4:50 PM Tor Barrios MD [facility]        Today's Diagnoses     Chronic seasonal allergic rhinitis, unspecified trigger    -  1    Asthma exacerbation          Care Instructions    STOP taking any medications that contain: Phenylephrine, Pseudoepherdrine, Oxymetazoline.    Follow up with up with your regular doctor later this week to have your blood pressure checked.          Follow-ups after your visit        Who to contact     If you have questions or need follow up information about today's clinic visit or your schedule please contact [facility] directly at [phone].  Normal or non-critical lab and imaging results will be communicated to you by MyChart, letter or phone within 4 business days after the clinic has received the results. If you do not hear from us within 7 days, please contact the clinic through MyChart or phone. If you have a critical or abnormal lab result, we will notify you by phone as soon as possible.  Submit refill requests through The Daily Muse or call your pharmacy and they will forward the refill request to us. Please allow 3 business days for your refill to be completed.          Additional Information About Your Visit        MyChart Information     The Daily Muse lets you send messages to your doctor, view your test results, renew your prescriptions, schedule appointments and more. To sign up, go to [email or web address] . Click on \"Log in\" on the left side of the screen, which will take you to the Welcome page. Then click on \"Sign up Now\" on the right side of the page.     You will be asked to enter the access code listed below, as well as some personal information. Please follow the directions to create your username and " password.     Your access code is: [email or web address]  Expires: 2017  5:27 PM     Your access code will  in 90 days. If you need help or a new code, please call your [place] clinic or [phone].        Care EveryWhere ID     This is your Care EveryWhere ID. This could be used by other organizations to access your [facility] medical records  [id number]        Your Vitals Were     Pulse Respirations Pulse Oximetry BMI (Body Mass Index)          82 22 98% 26.94 kg/m2         Blood Pressure from Last 3 Encounters:   17 (!) 170/100   17 128/84   17 104/64    Weight from Last 3 Encounters:   17 172 lb (78 kg)   17 166 lb (75.3 kg)   17 167 lb 9.6 oz (76 kg)              Today, you had the following     No orders found for display         Today's Medication Changes          These changes are accurate as of: 17  5:27 PM.  If you have any questions, ask your nurse or doctor.               Start taking these medicines.        Dose/Directions    methylPREDNISolone 4 MG tablet   Commonly known as:  MEDROL DOSEPAK   Used for:  Chronic seasonal allergic rhinitis, unspecified trigger, Asthma exacerbation   Started by:  Tor Barrios MD        Follow package instructions   Quantity:  21 tablet   Refills:  0            Where to get your medicines      These medications were sent to [facility] [address] [place] [address] [address]  [address] [facility] [address]     Phone:  [phone]     methylPREDNISolone 4 MG tablet                Primary Care Provider Office Phone # Fax #    KENNEDY Ruby [facility] [phone]       [facility] [place] [address] [place] [address]        Equal Access to Services     MUSA MATIAS : Murphy Lombardi, wagiselleda luannetta, qaybta kaalmada german, leighann soto. So [facility] [phone].    ATENCIÓN: Si manoj pantoja arevalo  disposición servicios gratuitos de asistencia lingüística. Robbie zamora [phone].    We comply with applicable federal civil rights laws and [place] laws. We do not discriminate on the basis of race, color, national origin, age, disability sex, sexual orientation or gender identity.            Thank you!     Thank you for choosing [facility]  for your care. Our goal is always to provide you with excellent care. Hearing back from our patients is one way we can continue to improve our services. Please take a few minutes to complete the written survey that you may receive in the mail after your visit with us. Thank you!             Your Updated Medication List - Protect others around you: Learn how to safely use, store and throw away your medicines at www.disposemymeds.org.          This list is accurate as of: 8/7/17  5:27 PM.  Always use your most recent med list.                   Brand Name Dispense Instructions for use Diagnosis    * albuterol (2.5 MG/3ML) 0.083% neb solution      Take 1 vial by nebulization every 6 hours as needed for shortness of breath / dyspnea or wheezing Reported on 4/4/2017        * albuterol 108 (90 BASE) MCG/ACT Inhaler    PROAIR HFA/PROVENTIL HFA/VENTOLIN HFA    1 Inhaler    Inhale 2 puffs into the lungs every 6 hours    Moderate persistent asthma without complication       estradiol 0.05 MG/24HR BIW patch    VIVELLE-DOT    24 patch    Place 1 patch onto the skin twice a week    Symptomatic menopausal or female climacteric states       fluticasone 50 MCG/ACT spray    FLONASE     Spray 1 spray into both nostrils daily    Moderate persistent asthma without complication       fluticasone-salmeterol 250-50 MCG/DOSE diskus inhaler    ADVAIR    3 Inhaler    Inhale 1 puff into the lungs 2 times daily        ipratropium 0.06 % spray    ATROVENT    1 Box    Spray 2 sprays into both nostrils 4 times daily as needed for rhinitis    Acute recurrent maxillary sinusitis        loratadine 10 MG tablet    CLARITIN     Take 10 mg by mouth daily        methylPREDNISolone 4 MG tablet    MEDROL DOSEPAK    21 tablet    Follow package instructions    Chronic seasonal allergic rhinitis, unspecified trigger, Asthma exacerbation       mometasone-formoterol 200-5 MCG/ACT oral inhaler    DULERA    8.8 g    Inhale 2 puffs into the lungs 2 times daily    Moderate persistent asthma without complication       montelukast 10 MG tablet    SINGULAIR    30 tablet    Take 1 tablet (10 mg) by mouth At Bedtime    Moderate persistent asthma without complication       progesterone 100 MG capsule    PROMETRIUM    90 capsule    Take 1 capsule (100 mg) by mouth daily    Symptomatic menopausal or female climacteric states       VITAMIN D (CHOLECALCIFEROL) PO      Take by mouth daily        * Notice:  This list has 2 medication(s) that are the same as other medications prescribed for you. Read the directions carefully, and ask your doctor or other care provider to review them with you.

## 2017-08-07 NOTE — NURSING NOTE
"Chief Complaint   Patient presents with     Urgent Care     has been having cold/allergies for a while, wheezing. Notes that she doesn't feel well and took bp today and it was 169/111 at [facility].        Initial BP (!) 198/112  Pulse 82  Resp 22  Wt 172 lb (78 kg)  SpO2 98%  BMI 26.94 kg/m2 Estimated body mass index is 26.94 kg/(m^2) as calculated from the following:    Height as of 4/4/17: 5' 7\" (1.702 m).    Weight as of this encounter: 172 lb (78 kg).  Medication Reconciliation: complete  Mellissa Russell CMA  "

## 2017-08-07 NOTE — PROGRESS NOTES
HPI Comments:   Patient is a known asthmatic and has a history of seasonal allergic rhinitis, who comes in today due to one-week persistence of her nasal congestion and elevated blood pressure (when she checked it at the pharmacy today, it was reportedly 169/111).  She has been taking decongestant tablet containing Phenylephrine.  No history of hypertension.    Has sinus fullness and nasal discharge is primarily clear.  Has been feeling more short of breath and has been using her rescue inhaler more.  Occasional wheezing.  Cough is non-productive.  No fever or chills.      Past Medical History:   Diagnosis Date     Thyroid disease      Uncomplicated asthma        Review of Systems   Constitutional: Positive for malaise/fatigue. Negative for chills and fever.   HENT: Positive for congestion and sore throat (due to post-nasal drip). Negative for ear pain.    Respiratory: Positive for cough, shortness of breath and wheezing. Negative for hemoptysis and sputum production.    Cardiovascular: Negative for chest pain.   Gastrointestinal: Negative for diarrhea, nausea and vomiting.   Musculoskeletal: Negative for myalgias.   Neurological: Negative for dizziness, sensory change, focal weakness and headaches.       BP (!) 170/100  Pulse 82  Resp 22  Wt 172 lb (78 kg)  SpO2 98%  BMI 26.94 kg/m2      Physical Exam   Constitutional: She is oriented to person, place, and time.   HENT:   Right Ear: External ear normal.   Left Ear: External ear normal.   Mouth/Throat: Oropharynx is clear and moist. No oropharyngeal exudate.   (+) marked nasal congestion   Cardiovascular: Normal rate, regular rhythm and normal heart sounds.    Pulmonary/Chest: Effort normal. No respiratory distress. She has no wheezes. She has no rales.   (+) tight air entry   Lymphadenopathy:     She has no cervical adenopathy.   Neurological: She is alert and oriented to person, place, and time. Coordination normal. GCS score is 15.   Vitals reviewed.         ICD-10-CM    1. Asthma exacerbation J45.901 methylPREDNISolone (MEDROL DOSEPAK) 4 MG tablet   2. Chronic seasonal allergic rhinitis, unspecified trigger J30.2 methylPREDNISolone (MEDROL DOSEPAK) 4 MG tablet   3. Elevated blood pressure reading R03.0     likely due to Phenylephrine     **please refer to HPI for status of conditions      Patient Instructions   STOP taking any medications that contain: Phenylephrine, Pseudoepherdrine, Oxymetazoline.    Follow up with up with your regular doctor later this week to have your blood pressure checked.

## 2017-08-07 NOTE — PATIENT INSTRUCTIONS
STOP taking any medications that contain: Phenylephrine, Pseudoepherdrine, Oxymetazoline.    Follow up with up with your regular doctor later this week to have your blood pressure checked.

## 2017-08-28 ENCOUNTER — RADIANT APPOINTMENT (OUTPATIENT)
Dept: MAMMOGRAPHY | Facility: CLINIC | Age: 57
End: 2017-08-28
Payer: COMMERCIAL

## 2017-08-28 DIAGNOSIS — Z01.419 ENCOUNTER FOR GYNECOLOGICAL EXAMINATION WITHOUT ABNORMAL FINDING: ICD-10-CM

## 2017-08-28 PROCEDURE — G0202 SCR MAMMO BI INCL CAD: HCPCS | Mod: TC

## 2017-08-28 PROCEDURE — 77063 BREAST TOMOSYNTHESIS BI: CPT | Mod: TC

## 2017-08-31 ENCOUNTER — OFFICE VISIT (OUTPATIENT)
Dept: PEDIATRICS | Facility: CLINIC | Age: 57
End: 2017-08-31
Payer: COMMERCIAL

## 2017-08-31 VITALS
TEMPERATURE: 97.1 F | HEART RATE: 74 BPM | WEIGHT: 171.3 LBS | DIASTOLIC BLOOD PRESSURE: 84 MMHG | SYSTOLIC BLOOD PRESSURE: 144 MMHG | HEIGHT: 67 IN | BODY MASS INDEX: 26.89 KG/M2 | OXYGEN SATURATION: 97 %

## 2017-08-31 DIAGNOSIS — F41.9 ANXIETY: ICD-10-CM

## 2017-08-31 DIAGNOSIS — J45.40 MODERATE PERSISTENT ASTHMA WITHOUT COMPLICATION: ICD-10-CM

## 2017-08-31 DIAGNOSIS — I10 BENIGN ESSENTIAL HYPERTENSION: ICD-10-CM

## 2017-08-31 DIAGNOSIS — J30.89 SEASONAL ALLERGIC RHINITIS DUE TO OTHER ALLERGIC TRIGGER, UNSPECIFIED CHRONICITY: Primary | ICD-10-CM

## 2017-08-31 PROCEDURE — 99214 OFFICE O/P EST MOD 30 MIN: CPT | Performed by: NURSE PRACTITIONER

## 2017-08-31 RX ORDER — PREDNISONE 20 MG/1
TABLET ORAL
Qty: 20 TABLET | Refills: 0 | Status: SHIPPED | OUTPATIENT
Start: 2017-08-31 | End: 2017-11-28

## 2017-08-31 RX ORDER — AZELASTINE 1 MG/ML
1-2 SPRAY, METERED NASAL 2 TIMES DAILY
Qty: 1 BOTTLE | Refills: 1 | Status: SHIPPED | OUTPATIENT
Start: 2017-08-31 | End: 2018-08-14

## 2017-08-31 NOTE — NURSING NOTE
"Chief Complaint   Patient presents with     URI       Initial /84 (BP Location: Right arm, Patient Position: Sitting, Cuff Size: Adult Regular)  Pulse 74  Temp 97.1  F (36.2  C) (Tympanic)  Ht 5' 7\" (1.702 m)  Wt 171 lb 4.8 oz (77.7 kg)  SpO2 97%  BMI 26.83 kg/m2 Estimated body mass index is 26.83 kg/(m^2) as calculated from the following:    Height as of this encounter: 5' 7\" (1.702 m).    Weight as of this encounter: 171 lb 4.8 oz (77.7 kg).  Medication Reconciliation: complete   Shelby Villa CMA    "

## 2017-08-31 NOTE — MR AVS SNAPSHOT
After Visit Summary   8/31/2017    Ophelia Caal    MRN: [id number]           Patient Information     Date Of Birth          1960        Visit Information        Provider Department      8/31/2017 9:40 AM Lien Howard APRN CNP [facility] [place]        Today's Diagnoses     Seasonal allergic rhinitis due to other allergic trigger, unspecified chronicity    -  1    Moderate persistent asthma without complication        Benign essential hypertension        Anxiety          Care Instructions    -Steroid taper  -Add new nasal spray. Separate from flonase by a few hours  -Neti pot twice a day  -Closed windows  -See allergist  -Start zoloft once daily  -See therapist  -See me 1 month          Follow-ups after your visit        Additional Services     ALLERGY/ASTHMA ADULT REFERRAL       Your provider has referred you to: [facility]: [facility] [place] [facility] [place] [phone]   [email or web address]    Please be aware that coverage of these services is subject to the terms and limitations of your health insurance plan.  Call member services at your health plan with any benefit or coverage questions.      Please bring the following with you to your appointment:    (1) Any X-Rays, CTs or MRIs which have been performed.  Contact the facility where they were done to arrange for  prior to your scheduled appointment.    (2) List of current medications  (3) This referral request   (4) Any documents/labs given to you for this referral                  Who to contact     If you have questions or need follow up information about today's clinic visit or your schedule please contact [facility] directly at [phone].  Normal or non-critical lab and imaging results will be communicated to you by MyChart, letter or phone within 4 business days after the clinic has received the results. If you do not hear from us within 7 days, please contact the clinic through  "[employer] or phone. If you have a critical or abnormal lab result, we will notify you by phone as soon as possible.  Submit refill requests through [employer] or call your pharmacy and they will forward the refill request to us. Please allow 3 business days for your refill to be completed.          Additional Information About Your Visit        [employer] Information     [employer] lets you send messages to your doctor, view your test results, renew your prescriptions, schedule appointments and more. To sign up, go to [email or web address] . Click on \"Log in\" on the left side of the screen, which will take you to the Welcome page. Then click on \"Sign up Now\" on the right side of the page.     You will be asked to enter the access code listed below, as well as some personal information. Please follow the directions to create your username and password.     Your access code is: [email or web address]  Expires: 2017  5:27 PM     Your access code will  in 90 days. If you need help or a new code, please call your [facility] clinic or [phone].        Care EveryWhere ID     This is your Care EveryWhere ID. This could be used by other organizations to access your [facility] medical records  [id number]        Your Vitals Were     Pulse Temperature Height Pulse Oximetry BMI (Body Mass Index)       74 97.1  F (36.2  C) (Tympanic) 5' 7\" (1.702 m) 97% 26.83 kg/m2        Blood Pressure from Last 3 Encounters:   17 144/84   17 (!) 170/100   17 128/84    Weight from Last 3 Encounters:   17 171 lb 4.8 oz (77.7 kg)   17 172 lb (78 kg)   17 166 lb (75.3 kg)              We Performed the Following     ALLERGY/ASTHMA ADULT REFERRAL          Today's Medication Changes          These changes are accurate as of: 17 10:18 AM.  If you have any questions, ask your nurse or doctor.               Start taking these medicines.        Dose/Directions    azelastine 0.1 % spray   Commonly known as:  ASTELIN "   Used for:  Seasonal allergic rhinitis due to other allergic trigger, unspecified chronicity   Started by:  Lien Howard APRN CNP        Dose:  1-2 spray   Spray 1-2 sprays into both nostrils 2 times daily   Quantity:  1 Bottle   Refills:  1       predniSONE 20 MG tablet   Commonly known as:  DELTASONE   Used for:  Seasonal allergic rhinitis due to other allergic trigger, unspecified chronicity   Started by:  Lien Howard APRN CNP        Take 3 tabs (60 mg) by mouth daily x 3 days, 2 tabs (40 mg) daily x 3 days, 1 tab (20 mg) daily x 3 days, then 1/2 tab (10 mg) x 3 days.   Quantity:  20 tablet   Refills:  0       sertraline 50 MG tablet   Commonly known as:  ZOLOFT   Used for:  Anxiety   Started by:  Lien Howard APRN CNP        Dose:  50 mg   Take 1 tablet (50 mg) by mouth daily   Quantity:  90 tablet   Refills:  0         Stop taking these medicines if you haven't already. Please contact your care team if you have questions.     fluticasone-salmeterol 250-50 MCG/DOSE diskus inhaler   Commonly known as:  ADVAIR   Stopped by:  Lien Howard APRN CNP           loratadine 10 MG tablet   Commonly known as:  CLARITIN   Stopped by:  Lien Howard APRN CNP                Where to get your medicines      These medications were sent to [facility] [address] [place] [address] [address]  [address] [facility] [address]     Phone:  [phone]     azelastine 0.1 % spray    predniSONE 20 MG tablet    sertraline 50 MG tablet                Primary Care Provider Office Phone # Fax #    KENNEDY Ruby [phone] [address] [place] [address]        Equal Access to Services     [facility] POLLY AH: Murphy Lombardi, walinsey alvares, chino kaalmada german, leighann soto. So [facility] [phone].    ATENCIÓN: Si habla español, tiene a arevalo disposición servicios  milly de asistencia lingüística. Robbie zamora [phone].    We comply with applicable federal civil rights laws and [place] laws. We do not discriminate on the basis of race, color, national origin, age, disability sex, sexual orientation or gender identity.            Thank you!     Thank you for choosing [facility]  for your care. Our goal is always to provide you with excellent care. Hearing back from our patients is one way we can continue to improve our services. Please take a few minutes to complete the written survey that you may receive in the mail after your visit with us. Thank you!             Your Updated Medication List - Protect others around you: Learn how to safely use, store and throw away your medicines at www.disposemymeds.org.          This list is accurate as of: 8/31/17 10:18 AM.  Always use your most recent med list.                   Brand Name Dispense Instructions for use Diagnosis    albuterol 108 (90 BASE) MCG/ACT Inhaler    PROAIR HFA/PROVENTIL HFA/VENTOLIN HFA    1 Inhaler    Inhale 2 puffs into the lungs every 6 hours    Moderate persistent asthma without complication       azelastine 0.1 % spray    ASTELIN    1 Bottle    Spray 1-2 sprays into both nostrils 2 times daily    Seasonal allergic rhinitis due to other allergic trigger, unspecified chronicity       estradiol 0.05 MG/24HR BIW patch    VIVELLE-DOT    24 patch    Place 1 patch onto the skin twice a week    Symptomatic menopausal or female climacteric states       fluticasone 50 MCG/ACT spray    FLONASE     Spray 1 spray into both nostrils daily    Moderate persistent asthma without complication       ipratropium 0.06 % spray    ATROVENT    1 Box    Spray 2 sprays into both nostrils 4 times daily as needed for rhinitis    Acute recurrent maxillary sinusitis       methylPREDNISolone 4 MG tablet    MEDROL DOSEPAK    21 tablet    Follow package instructions    Chronic seasonal allergic rhinitis, unspecified  trigger, Asthma exacerbation       mometasone-formoterol 200-5 MCG/ACT oral inhaler    DULERA    8.8 g    Inhale 2 puffs into the lungs 2 times daily    Moderate persistent asthma without complication       montelukast 10 MG tablet    SINGULAIR    30 tablet    Take 1 tablet (10 mg) by mouth At Bedtime    Moderate persistent asthma without complication       predniSONE 20 MG tablet    DELTASONE    20 tablet    Take 3 tabs (60 mg) by mouth daily x 3 days, 2 tabs (40 mg) daily x 3 days, 1 tab (20 mg) daily x 3 days, then 1/2 tab (10 mg) x 3 days.    Seasonal allergic rhinitis due to other allergic trigger, unspecified chronicity       progesterone 100 MG capsule    PROMETRIUM    90 capsule    Take 1 capsule (100 mg) by mouth daily    Symptomatic menopausal or female climacteric states       sertraline 50 MG tablet    ZOLOFT    90 tablet    Take 1 tablet (50 mg) by mouth daily    Anxiety       VITAMIN D (CHOLECALCIFEROL) PO      Take by mouth daily        XYZAL PO

## 2017-08-31 NOTE — PATIENT INSTRUCTIONS
-Steroid taper  -Add new nasal spray. Separate from flonase by a few hours  -Neti pot twice a day  -Closed windows  -See allergist  -Start zoloft once daily  -See therapist  -See me 1 month

## 2017-09-01 ASSESSMENT — ASTHMA QUESTIONNAIRES: ACT_TOTALSCORE: 13

## 2017-09-11 PROBLEM — F41.9 ANXIETY: Status: ACTIVE | Noted: 2017-09-11

## 2017-09-11 PROBLEM — I10 BENIGN ESSENTIAL HYPERTENSION: Status: ACTIVE | Noted: 2017-08-31

## 2017-09-11 PROBLEM — I10 BENIGN ESSENTIAL HYPERTENSION: Status: ACTIVE | Noted: 2017-09-11

## 2017-09-11 PROBLEM — F41.9 ANXIETY: Status: ACTIVE | Noted: 2017-08-31

## 2017-11-28 ENCOUNTER — OFFICE VISIT (OUTPATIENT)
Dept: PEDIATRICS | Facility: CLINIC | Age: 57
End: 2017-11-28
Payer: COMMERCIAL

## 2017-11-28 VITALS
TEMPERATURE: 97.9 F | HEART RATE: 80 BPM | DIASTOLIC BLOOD PRESSURE: 88 MMHG | SYSTOLIC BLOOD PRESSURE: 144 MMHG | WEIGHT: 177.5 LBS | BODY MASS INDEX: 27.86 KG/M2 | OXYGEN SATURATION: 99 % | HEIGHT: 67 IN

## 2017-11-28 DIAGNOSIS — I10 BENIGN ESSENTIAL HYPERTENSION: ICD-10-CM

## 2017-11-28 DIAGNOSIS — J45.40 MODERATE PERSISTENT ASTHMA WITHOUT COMPLICATION: ICD-10-CM

## 2017-11-28 DIAGNOSIS — K21.9 GASTROESOPHAGEAL REFLUX DISEASE WITHOUT ESOPHAGITIS: Primary | ICD-10-CM

## 2017-11-28 DIAGNOSIS — F41.9 ANXIETY: ICD-10-CM

## 2017-11-28 PROCEDURE — 99214 OFFICE O/P EST MOD 30 MIN: CPT | Performed by: NURSE PRACTITIONER

## 2017-11-28 NOTE — PROGRESS NOTES
"  SUBJECTIVE:   Ophelia Caal is a 57 year old female who presents to clinic today for the following health issues:    Patient here for blood pressure check.   And  Patient would like to discuss medications  AND  Asthma Follow-Up    Was ACT completed today?    Yes    ACT Total Scores 11/28/2017   ACT TOTAL SCORE (Goal Greater than or Equal to 20) 15   In the past 12 months, how many times did you visit the emergency room for your asthma without being admitted to the hospital? 0   In the past 12 months, how many times were you hospitalized overnight because of your asthma? 0       Recent asthma triggers that patient is dealing with: smoke, strong odors and fumes and exercise or sports      Amount of exercise or physical activity: 6-7 days/week for an average of 15-30 minutes    Problems taking medications regularly: No - but has not been taking meds regularly    Medication side effects: none    Diet: regular (no restrictions)    GERD/Heartburn  Reflux    Duration: worsened over last few weeks, problems swallowing    Description (location/character/radiation): reflux, difficulty swallowing, \"phlegm\"    Intensity:  moderate    Accompanying signs and symptoms:  food getting stuck: YES  nausea/vomiting/blood: no   abdominal pain: no   black/tarry or bloody stools: no :    History (similar episodes/previous evaluation): None    Precipitating or alleviating factors:  worse with no particular food or drink.  current NSAID/Aspirin use: no     Therapies tried and outcome: none: Symptoms not alleviated      States she knows her BP is up because she has checked it at the pharmacy. Is asymptomatic. Doesn't watch what she eats. Doesn't exercise.     States the reflux is only in the past few weeks. Thinks it is related to gaining weight. After she eats gets reflux. Once she had a little trouble swallowing but not frequently. No chest pain, shortness of breath with exertion (other than asthma), etc.     Asthma is better than " "usual. Only takes Dulera a few times a week. Has an allergy appointment coming up. Uses her rescue inhaler often.     ROS: const/heent/resp/cv/gi otherwise negative     OBJECTIVE:  /84 (Cuff Size: Adult Regular)  Pulse 80  Temp 97.9  F (36.6  C) (Tympanic)  Ht 5' 7\" (1.702 m)  Wt 177 lb 8 oz (80.5 kg)  SpO2 99%  BMI 27.8 kg/m2  CONSTITUTIONAL: Alert, well-nourished, well-groomed, NAD  RESP: Lungs CTA. No wheeze, rhonchi, rales.  CV: HRRR S1 S2 No MRG. No peripheral edema  GI: Abdomen flat. BS x 4. No TTP. No HSM or masses. No CVAT      ASSESSMENT/PLAN:  (K21.9) Gastroesophageal reflux disease without esophagitis  (primary encounter diagnosis)  Comment: Sx consistent with GERD. Likely 2/2 gaining weight. No evidence of more serious cause of sx such as coronary dx.   Plan: Take ranitidine OTC BID. Reduce food intake. Discussed foods to avoid.   Call if no improvement in 2 weeks.     (J45.40) Moderate persistent asthma without complication  Comment: Poorly controlled due to poor compliance with meds. Doesn't seem to be acutely exacerbated today so will hold on steroids.   Plan: mometasone-formoterol (DULERA) 200-5 MCG/ACT         oral inhaler        Patient agrees to start taking her Dulera twice a day. Ordered her 2 inhalers at a time so she could put one at home and 1 in her purse.         Seeing allergist next week. She agrees to re-start Singular and other allergy meds afterwards.         Discussed supportive cares and reasons to return. Discussed reasons to seek care urgently.  Specifically, dicussed that worsening shortness of breath needs to be evaluated for other causes.         Once asthma is under better control recommended starting exercise again.     (F41.9) Anxiety  Comment: Much improved as situational factors have subsided.   Plan: Discussed how to wean Zoloft. Given low dose, ok to go down to 1/2 tab per day for 2 weeks then off. If having withdrawal side effects, which were discussed she " can take a 1/2 tab every other day.   Discussed risk of relapse. Declines therapy. Recommended exercise.     (I10) Benign essential hypertension  Comment: Poorly controlled. Patient wishes to work on lifestyle factors and recheck in a month.   Plan: Exercise at least every 48 hours.  DASH diet.  Stress management.  F/U 1 month. Will check micro albumin at that time.       Lien Howard, MONYP-DNP.

## 2017-11-28 NOTE — PATIENT INSTRUCTIONS
-Start zoloft 1/2 tab per day for 2 weeks then stop      -Myfitness pal for 1 month  -Exercise every other day  -DASH diet.       -Inhaler twice daily  -See allergy--Ask if you should re-start singulair      -Zantac as needed for reflux

## 2017-11-28 NOTE — MR AVS SNAPSHOT
After Visit Summary   11/28/2017    Ophelia Caal    MRN: [id number]           Patient Information     Date Of Birth          1960        Visit Information        Provider Department      11/28/2017 1:00 PM Lien Howard APRN CNP [facility]        Today's Diagnoses     Gastroesophageal reflux disease without esophagitis    -  1    Moderate persistent asthma without complication        Anxiety        Benign essential hypertension          Care Instructions    -Start zoloft 1/2 tab per day for 2 weeks then stop      -Myfitness pal for 1 month  -Exercise every other day  -DASH diet.       -Inhaler twice daily  -See allergy--Ask if you should re-start singulair      -Zantac as needed for reflux          Follow-ups after your visit        Your next 10 appointments already scheduled     Feb 06, 2018  1:15 PM CST   New Visit with Corrine Howard MD   [facility] ([facility])    [address] [place] [address]  [address]  [facility] [address] [phone]              Who to contact     If you have questions or need follow up information about today's clinic visit or your schedule please contact [facility] directly at [phone].  Normal or non-critical lab and imaging results will be communicated to you by MyChart, letter or phone within 4 business days after the clinic has received the results. If you do not hear from us within 7 days, please contact the clinic through [employer] or phone. If you have a critical or abnormal lab result, we will notify you by phone as soon as possible.  Submit refill requests through [employer] or call your pharmacy and they will forward the refill request to us. Please allow 3 business days for your refill to be completed.          Additional Information About Your Visit        MyChart Information     [employer] lets you send messages to your doctor, view your test results, renew your prescriptions, schedule  "appointments and more. To sign up, go to [email or web address] . Click on \"Log in\" on the left side of the screen, which will take you to the Welcome page. Then click on \"Sign up Now\" on the right side of the page.     You will be asked to enter the access code listed below, as well as some personal information. Please follow the directions to create your username and password.     Your access code is: [email or web address]  Expires: 2018  1:34 PM     Your access code will  in 90 days. If you need help or a new code, please call your [facility] clinic or [phone].        Care EveryWhere ID     This is your Care EveryWhere ID. This could be used by other organizations to access your [facility] medical records  [id number]        Your Vitals Were     Pulse Temperature Height Pulse Oximetry BMI (Body Mass Index)       80 97.9  F (36.6  C) (Tympanic) 5' 7\" (1.702 m) 99% 27.8 kg/m2        Blood Pressure from Last 3 Encounters:   17 144/88   17 144/84   17 (!) 170/100    Weight from Last 3 Encounters:   17 177 lb 8 oz (80.5 kg)   17 171 lb 4.8 oz (77.7 kg)   17 172 lb (78 kg)              Today, you had the following     No orders found for display         Today's Medication Changes          These changes are accurate as of: 17  1:34 PM.  If you have any questions, ask your nurse or doctor.               Stop taking these medicines if you haven't already. Please contact your care team if you have questions.     montelukast 10 MG tablet   Commonly known as:  SINGULAIR   Stopped by:  Lien Howard APRN CNP                Where to get your medicines      These medications were sent to [facility]  [place] - [address]  [address] [address]     Phone:  [phone]     mometasone-formoterol 200-5 MCG/ACT oral inhaler                Primary Care Provider Office Phone # Fax #    KENNEDY Ruby CNP " [phone] [address] [place] [address]        Equal Access to Services     MUSA MATIAS : Hadii aad ku hadjayantcristiano Maria C, waaxda luqadaha, qaybta kaalmada german, leighann catherinein hayaacathy saraviafloyd hickman juan manuel soto. So [facility] [phone].    ATENCIÓN: Si habla español, tiene a arevalo disposición servicios gratuitos de asistencia lingüística. Llame al [phone].    We comply with applicable federal civil rights laws and [place] laws. We do not discriminate on the basis of race, color, national origin, age, disability, sex, sexual orientation, or gender identity.            Thank you!     Thank you for choosing [facility]  for your care. Our goal is always to provide you with excellent care. Hearing back from our patients is one way we can continue to improve our services. Please take a few minutes to complete the written survey that you may receive in the mail after your visit with us. Thank you!             Your Updated Medication List - Protect others around you: Learn how to safely use, store and throw away your medicines at www.disposemymeds.org.          This list is accurate as of: 11/28/17  1:34 PM.  Always use your most recent med list.                   Brand Name Dispense Instructions for use Diagnosis    albuterol 108 (90 BASE) MCG/ACT Inhaler    PROAIR HFA/PROVENTIL HFA/VENTOLIN HFA    1 Inhaler    Inhale 2 puffs into the lungs every 6 hours    Moderate persistent asthma without complication       azelastine 0.1 % spray    ASTELIN    1 Bottle    Spray 1-2 sprays into both nostrils 2 times daily    Seasonal allergic rhinitis due to other allergic trigger, unspecified chronicity       estradiol 0.05 MG/24HR BIW patch    VIVELLE-DOT    24 patch    Place 1 patch onto the skin twice a week    Symptomatic menopausal or female climacteric states       fluticasone 50 MCG/ACT spray    FLONASE     Spray 1 spray into both nostrils daily    Moderate persistent asthma without  complication       ipratropium 0.06 % spray    ATROVENT    1 Box    Spray 2 sprays into both nostrils 4 times daily as needed for rhinitis    Acute recurrent maxillary sinusitis       mometasone-formoterol 200-5 MCG/ACT oral inhaler    DULERA    2 Inhaler    Inhale 2 puffs into the lungs 2 times daily    Moderate persistent asthma without complication       progesterone 100 MG capsule    PROMETRIUM    90 capsule    Take 1 capsule (100 mg) by mouth daily    Symptomatic menopausal or female climacteric states       sertraline 50 MG tablet    ZOLOFT    90 tablet    Take 1 tablet (50 mg) by mouth daily    Anxiety       VITAMIN D (CHOLECALCIFEROL) PO      Take by mouth daily        XYZAL PO

## 2017-11-29 ASSESSMENT — ASTHMA QUESTIONNAIRES: ACT_TOTALSCORE: 15

## 2017-12-05 ENCOUNTER — TRANSFERRED RECORDS (OUTPATIENT)
Dept: HEALTH INFORMATION MANAGEMENT | Facility: CLINIC | Age: 57
End: 2017-12-05

## 2017-12-28 ENCOUNTER — TRANSFERRED RECORDS (OUTPATIENT)
Dept: HEALTH INFORMATION MANAGEMENT | Facility: CLINIC | Age: 57
End: 2017-12-28

## 2018-01-09 ENCOUNTER — OFFICE VISIT (OUTPATIENT)
Dept: PEDIATRICS | Facility: CLINIC | Age: 58
End: 2018-01-09
Payer: COMMERCIAL

## 2018-01-09 VITALS
OXYGEN SATURATION: 97 % | WEIGHT: 177.5 LBS | HEART RATE: 90 BPM | DIASTOLIC BLOOD PRESSURE: 84 MMHG | SYSTOLIC BLOOD PRESSURE: 142 MMHG | BODY MASS INDEX: 27.86 KG/M2 | HEIGHT: 67 IN | TEMPERATURE: 98.4 F

## 2018-01-09 DIAGNOSIS — I10 BENIGN ESSENTIAL HYPERTENSION: Primary | ICD-10-CM

## 2018-01-09 PROCEDURE — 99214 OFFICE O/P EST MOD 30 MIN: CPT | Performed by: NURSE PRACTITIONER

## 2018-01-09 PROCEDURE — 36415 COLL VENOUS BLD VENIPUNCTURE: CPT | Performed by: NURSE PRACTITIONER

## 2018-01-09 PROCEDURE — 80048 BASIC METABOLIC PNL TOTAL CA: CPT | Performed by: NURSE PRACTITIONER

## 2018-01-09 PROCEDURE — 82043 UR ALBUMIN QUANTITATIVE: CPT | Performed by: NURSE PRACTITIONER

## 2018-01-09 RX ORDER — HYDROCHLOROTHIAZIDE 12.5 MG/1
12.5 TABLET ORAL DAILY
Qty: 90 TABLET | Refills: 0 | Status: SHIPPED | OUTPATIENT
Start: 2018-01-09 | End: 2018-04-10

## 2018-01-09 ASSESSMENT — ANXIETY QUESTIONNAIRES
5. BEING SO RESTLESS THAT IT IS HARD TO SIT STILL: SEVERAL DAYS
2. NOT BEING ABLE TO STOP OR CONTROL WORRYING: NOT AT ALL
1. FEELING NERVOUS, ANXIOUS, OR ON EDGE: SEVERAL DAYS
3. WORRYING TOO MUCH ABOUT DIFFERENT THINGS: NOT AT ALL
6. BECOMING EASILY ANNOYED OR IRRITABLE: SEVERAL DAYS
GAD7 TOTAL SCORE: 4
IF YOU CHECKED OFF ANY PROBLEMS ON THIS QUESTIONNAIRE, HOW DIFFICULT HAVE THESE PROBLEMS MADE IT FOR YOU TO DO YOUR WORK, TAKE CARE OF THINGS AT HOME, OR GET ALONG WITH OTHER PEOPLE: NOT DIFFICULT AT ALL
7. FEELING AFRAID AS IF SOMETHING AWFUL MIGHT HAPPEN: NOT AT ALL

## 2018-01-09 ASSESSMENT — PATIENT HEALTH QUESTIONNAIRE - PHQ9: 5. POOR APPETITE OR OVEREATING: SEVERAL DAYS

## 2018-01-09 NOTE — PROGRESS NOTES
"  SUBJECTIVE:   Ophelia Caal is a 57 year old female who presents to clinic today for the following health issues:    Patient here today for follow up from last OV on 11/28.17.    GERD - Patient states this is much better.:    Asthma - Patient states this is good. Not having issues lately.:    Anxiety - Patient states this is good. No issues over holidays.:    Hypertension Follow-up      Outpatient blood pressures are not being checked.    Low Salt Diet: low salt  States she just joined [employer]      Amount of exercise or physical activity: 2-3 days/week for an average of 45-60 minutes    Problems taking medications regularly: No    Medication side effects: none    Diet: low salt and carbohydrate counting           ROS: const/heent/neuro/cv/resp otherwise negative     OBJECTIVE:  /84 (BP Location: Right arm, Patient Position: Sitting, Cuff Size: Adult Regular)  Pulse 90  Temp 98.4  F (36.9  C) (Tympanic)  Ht 5' 7\" (1.702 m)  Wt 177 lb 8 oz (80.5 kg)  SpO2 97%  BMI 27.8 kg/m2  CONSTITUTIONAL: Alert, well-nourished, well-groomed, NAD  RESP: Lungs CTA. No wheeze, rhonchi, rales.  CV: HRRR S1 S2 No MRG. No peripheral edema      ASSESSMENT/PLAN:  (I10) Benign essential hypertension  (primary encounter diagnosis)  Comment: Poor control. Has been borderline for quite some time. She is asymptomatic. She is willing to start medication.   Plan: hydrochlorothiazide 12.5 MG TABS tablet, Basic         metabolic panel, Basic metabolic panel, Albumin        Random Urine Quantitative with Creat Ratio        Discussed lifestyle changes. Discussed r/b/se of the medicine.   Patient Instructions   1. Start meds today  2. We will check labs and BP in a few weeks          LATASHA Lindsey-CARRIE.          "

## 2018-01-09 NOTE — MR AVS SNAPSHOT
"              After Visit Summary   1/9/2018    Ophelia Caal    MRN: [id number]           Patient Information     Date Of Birth          1960        Visit Information        Provider Department      1/9/2018 3:20 PM Lien Howard APRN CNP [facility]        Today's Diagnoses     Benign essential hypertension    -  1      Care Instructions    1. Start meds today  2. We will check labs in a few weeks          Follow-ups after your visit        Your next 10 appointments already scheduled     Feb 06, 2018  1:15 PM CST   [facility] with Corrine Howard MD   [facility]    [address] [place] [address]  [address]  [place] [address] [phone]            Feb 20, 2018  3:20 PM CST   Pre-Op physical with KENNEDY Ruby CNP   [facility]    [address] [place] [address]  [address]  [place] [address] [phone]              Who to contact     If you have questions or need follow up information about today's clinic visit or your schedule please contact [facility] directly at [phone].  Normal or non-critical lab and imaging results will be communicated to you by MyChart, letter or phone within 4 business days after the clinic has received the results. If you do not hear from us within 7 days, please contact the clinic through [employer] or phone. If you have a critical or abnormal lab result, we will notify you by phone as soon as possible.  Submit refill requests through [employer] or call your pharmacy and they will forward the refill request to us. Please allow 3 business days for your refill to be completed.          Additional Information About Your Visit        MyChart Information     [employer] lets you send messages to your doctor, view your test results, renew your prescriptions, schedule appointments and more. To sign up, go to [email or web address] . Click on \"Log in\" on the " "left side of the screen, which will take you to the Welcome page. Then click on \"Sign up Now\" on the right side of the page.     You will be asked to enter the access code listed below, as well as some personal information. Please follow the directions to create your username and password.     Your access code is: [email or web address]  Expires: 2018  1:34 PM     Your access code will  in 90 days. If you need help or a new code, please call your [facility] clinic or [phone].        Care EveryWhere ID     This is your Care EveryWhere ID. This could be used by other organizations to access your [facility] medical records  [id number]        Your Vitals Were     Pulse Temperature Height Pulse Oximetry BMI (Body Mass Index)       90 98.4  F (36.9  C) (Tympanic) 5' 7\" (1.702 m) 97% 27.8 kg/m2        Blood Pressure from Last 3 Encounters:   18 142/84   17 144/88   17 144/84    Weight from Last 3 Encounters:   18 177 lb 8 oz (80.5 kg)   17 177 lb 8 oz (80.5 kg)   17 171 lb 4.8 oz (77.7 kg)              Today, you had the following     No orders found for display       Primary Care Provider Office Phone # Fax #    KENNEDY Ruby [facility] [phone] [address] [place] [address]        Equal Access to Services     [facility] AH: Hadii aad ku hadasho Soomaali, waaxda luqadaha, qaybta kaalmada adeegyada, leighann zambrano . So [facility] [phone].    ATENCIÓN: Si jackila yoselin, tiene a arevalo disposición servicios gratuitos de asistencia lingüística. Llame al [phone].    We comply with applicable federal civil rights laws and [place] laws. We do not discriminate on the basis of race, color, national origin, age, disability, sex, sexual orientation, or gender identity.            Thank you!     Thank you for choosing [facility]  for your care. Our goal is always to provide you with excellent care. Hearing back " from our patients is one way we can continue to improve our services. Please take a few minutes to complete the written survey that you may receive in the mail after your visit with us. Thank you!             Your Updated Medication List - Protect others around you: Learn how to safely use, store and throw away your medicines at www.disposemymeds.org.          This list is accurate as of: 1/9/18  3:44 PM.  Always use your most recent med list.                   Brand Name Dispense Instructions for use Diagnosis    albuterol 108 (90 BASE) MCG/ACT Inhaler    PROAIR HFA/PROVENTIL HFA/VENTOLIN HFA    1 Inhaler    Inhale 2 puffs into the lungs every 6 hours    Moderate persistent asthma without complication       azelastine 0.1 % spray    ASTELIN    1 Bottle    Spray 1-2 sprays into both nostrils 2 times daily    Seasonal allergic rhinitis due to other allergic trigger, unspecified chronicity       fluticasone 50 MCG/ACT spray    FLONASE     Spray 1 spray into both nostrils daily    Moderate persistent asthma without complication       mometasone-formoterol 200-5 MCG/ACT oral inhaler    DULERA    2 Inhaler    Inhale 2 puffs into the lungs 2 times daily    Moderate persistent asthma without complication       VITAMIN D (CHOLECALCIFEROL) PO      Take by mouth daily        XYZAL PO

## 2018-01-09 NOTE — NURSING NOTE
"Chief Complaint   Patient presents with     RECHECK     follow up       Initial /84 (BP Location: Right arm, Patient Position: Sitting, Cuff Size: Adult Regular)  Pulse 90  Temp 98.4  F (36.9  C) (Tympanic)  Ht 5' 7\" (1.702 m)  Wt 177 lb 8 oz (80.5 kg)  SpO2 97%  BMI 27.8 kg/m2 Estimated body mass index is 27.8 kg/(m^2) as calculated from the following:    Height as of this encounter: 5' 7\" (1.702 m).    Weight as of this encounter: 177 lb 8 oz (80.5 kg).  Medication Reconciliation: complete   Shelby Villa CMA    "

## 2018-01-10 ASSESSMENT — ANXIETY QUESTIONNAIRES: GAD7 TOTAL SCORE: 4

## 2018-01-11 LAB
ANION GAP SERPL CALCULATED.3IONS-SCNC: 11 MMOL/L (ref 3–14)
BUN SERPL-MCNC: 18 MG/DL (ref 7–30)
CALCIUM SERPL-MCNC: 9.7 MG/DL (ref 8.5–10.1)
CHLORIDE SERPL-SCNC: 107 MMOL/L (ref 94–109)
CO2 SERPL-SCNC: 22 MMOL/L (ref 20–32)
CREAT SERPL-MCNC: 0.95 MG/DL (ref 0.52–1.04)
CREAT UR-MCNC: 91 MG/DL
GFR SERPL CREATININE-BSD FRML MDRD: 61 ML/MIN/1.7M2
GLUCOSE SERPL-MCNC: 88 MG/DL (ref 70–99)
MICROALBUMIN UR-MCNC: <5 MG/L
MICROALBUMIN/CREAT UR: NORMAL MG/G CR (ref 0–25)
POTASSIUM SERPL-SCNC: 4.9 MMOL/L (ref 3.4–5.3)
SODIUM SERPL-SCNC: 140 MMOL/L (ref 133–144)

## 2018-02-01 ENCOUNTER — TRANSFERRED RECORDS (OUTPATIENT)
Dept: HEALTH INFORMATION MANAGEMENT | Facility: CLINIC | Age: 58
End: 2018-02-01

## 2018-02-06 ENCOUNTER — OFFICE VISIT (OUTPATIENT)
Dept: DERMATOLOGY | Facility: CLINIC | Age: 58
End: 2018-02-06
Payer: COMMERCIAL

## 2018-02-06 DIAGNOSIS — L81.4 SOLAR LENTIGINOSIS: ICD-10-CM

## 2018-02-06 DIAGNOSIS — D48.9 NEOPLASM OF UNCERTAIN BEHAVIOR: ICD-10-CM

## 2018-02-06 DIAGNOSIS — L30.9 DERMATITIS: Primary | ICD-10-CM

## 2018-02-06 DIAGNOSIS — L82.1 SEBORRHEIC KERATOSIS: ICD-10-CM

## 2018-02-06 DIAGNOSIS — D22.9 MULTIPLE NEVI: ICD-10-CM

## 2018-02-06 PROCEDURE — 99243 OFF/OP CNSLTJ NEW/EST LOW 30: CPT | Mod: 25 | Performed by: DERMATOLOGY

## 2018-02-06 PROCEDURE — 11100 HC BIOPSY SKIN/SUBQ/MUC MEM, SINGLE LESION: CPT | Performed by: DERMATOLOGY

## 2018-02-06 PROCEDURE — 88305 TISSUE EXAM BY PATHOLOGIST: CPT | Performed by: DERMATOLOGY

## 2018-02-06 RX ORDER — LIDOCAINE HYDROCHLORIDE AND EPINEPHRINE 10; 10 MG/ML; UG/ML
3 INJECTION, SOLUTION INFILTRATION; PERINEURAL ONCE
Qty: 3 ML | Refills: 0 | OUTPATIENT
Start: 2018-02-06 | End: 2018-02-06

## 2018-02-06 RX ORDER — HYDROCORTISONE 25 MG/G
OINTMENT TOPICAL
Qty: 30 G | Refills: 1 | Status: SHIPPED | OUTPATIENT
Start: 2018-02-06 | End: 2021-03-11

## 2018-02-06 RX ORDER — MOMETASONE FUROATE 1 MG/G
OINTMENT TOPICAL
Qty: 90 G | Refills: 2 | Status: SHIPPED | OUTPATIENT
Start: 2018-02-06 | End: 2021-04-13

## 2018-02-06 NOTE — LETTER
2/6/2018      RE: Ophelia Caal  [address] [facility] [address]               DERMATOLOGY CLINIC VISIT NOTE      Service Date: 02/06/2018      CHIEF COMPLAINT:  Skin check.      DERMATOLOGY PROBLEM LIST:   1.  History of dysplastic nevus on abdomen, removed in [place] approximately 10 years ago.   2.  Multiple pigmented nevi.   3.  Seborrheic keratoses.   4.  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   5. Dermatitis of hands and lower face     HISTORY OF PRESENT ILLNESS:  Ms. Caal is a 57-year-old female presenting to Dermatology Clinic, seen today at the request of Lien Howard for skin check.  Ms. Caal notes that approximately 10 years ago she was diagnosed with a dysplastic nevus on her right lower abdomen.  This required reexcision.  This was performed in [place].  She has had multiple other moles removed at various times in her life, but none of which have been abnormal.  She describes that she develops intermittent scaling of her lower cutaneous lip.  She has tried a variety of lotions, none of which have been effective.  She also develops intermittent hand redness and scaling.      She has no painful or bleeding areas today.  No new moles.      Patient Active Problem List   Diagnosis     Moderate persistent asthma without complication     Anxiety     Benign essential hypertension       Allergies   Allergen Reactions     Fentanyl Hives     Itchiness, nausea         Current Outpatient Prescriptions   Medication     PREDNISONE PO     CEPHALEXIN PO     hydrocortisone 2.5 % ointment     mometasone (ELOCON) 0.1 % ointment     hydrochlorothiazide 12.5 MG TABS tablet     mometasone-formoterol (DULERA) 200-5 MCG/ACT oral inhaler     Levocetirizine Dihydrochloride (XYZAL PO)     fluticasone (FLONASE) 50 MCG/ACT spray     VITAMIN D, CHOLECALCIFEROL, PO     azelastine (ASTELIN) 0.1 % spray     albuterol (PROAIR HFA/PROVENTIL HFA/VENTOLIN HFA) 108 (90 BASE) MCG/ACT Inhaler     No  current facility-administered medications for this visit.          SOCIAL HISTORY:  The patient is .  She lives in [place].      FAMILY HISTORY:  Mother with squamous cell carcinoma.      REVIEW OF SYSTEMS:  Feels well without additional skin concerns today.      PHYSICAL EXAMINATION:   GENERAL:  The patient is a healthy-appearing 57-year-old female in no distress.   HEENT:  Conjunctivae are clear.   PULMONARY:  Breathing comfortably on room air.   ABDOMEN:  No abdominal distention.   CARDIOVASCULAR:  Extremities warm and well-perfused.   SKIN:  Examination today included the scalp, face, neck, chest, abdomen, back, arms, legs, hands, feet, buttocks.  Skin exam was normal except for as follows:   -Examination of the left lateral cheek, the nasal tip with light brown, evenly pigmented, approximately 1 cm patches.   -Scattered smaller 2-4 mm light brown patches on the lateral cheeks, the superior shoulders, the anterior chest and the upper back.   -Examination of the chest, upper and lower back and buttocks with greater than 50 approximately 2 mm macules, most with dark brown to black pigmentation centrally and medium brown pigment at the periphery with net-like pigment pattern.   -Biopsy scars on the upper and lower back and the right abdomen.   -Cherry red papules on the anterior chest and abdomen.   -Scattered waxy and tan papules on the bilateral lower extremities.   -Right temple with approximately 1 cm pink scaling papule that bleeds easily when excoriated.   -Examination of the left lower cutaneous lip with xerotic patch with overlying white scale in the background of erythema.   -Bilateral hands with erythema and scale with mild induration.     PROCEDURE NOTE:  The patient consented to a shave biopsy on the right temple.  Area infiltrated with 1 mL of lidocaine with epinephrine.  Shave biopsy of the center of the lesion performed.  Aluminum chloride, petrolatum and a bandage applied.  Wound care info  provided.      ASSESSMENT AND PLAN:   1.  Dermatitis of the left lower cutaneous lip and bilateral hands:  Differential diagnosis would include irritant versus allergic contact dermatitis.  I recommended gentle skin cares, washing with mild soap, application of hydrocortisone 2.5% ointment to rash on the face and mometasone 0.1% ointment twice daily to hand rash until clear and then as needed.  If recurrent, would consider patch testing.     2.  Seborrheic keratoses, benign, hyperkeratotic papules and plaques.  No treatment advised.     3.  History of dysplastic nevus on abdomen:  No signs of recurrence.     4.  Multiple pigmented nevi:  Ms. Streeter has nevi that are very dark in pigmentation but uniform and consistent overall.  No outliers seen on exam today.  Recommended yearly skin check.     5.  Solar lentigines:  Marker of past solar injury.  Recommended ongoing sun protection.     6.  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  Differential diagnosis would include a traumatized seborrheic keratosis versus basal cell carcinoma.  We will contact patient with biopsy results when available.           The patient to return in 1 year's time, sooner pending biopsy results.      Thank you for this consultation.     Duarte Howard MD  Dermatology Staff       cc:   [place] [address]         DUARTE HOWARD MD             D: 2018   T: 2018   MT: al      Name:     KENNY STREETER   MRN:      [id number]        Account:      [id number]   :      1960           Service Date: 2018      Document: [id number]

## 2018-02-06 NOTE — MR AVS SNAPSHOT
After Visit Summary   2/6/2018    Ophelia Caal    MRN: [id number]           Patient Information     Date Of Birth          1960        Visit Information        Provider Department      2/6/2018 1:15 PM Corrine Howard MD [facility]        Today's Diagnoses     Dermatitis    -  1      Care Instructions                    Pediatric Dermatology  [facility]  [address]  1st [place]  [address]  Phone: [phone]    Pediatric & Adult Dermatology  [facility]  [address]   2nd Floor  [facility] [address]  [phone]                  General information: Dr. Corrine Howard is a board-certified dermatologist with subspecialty certification in pediatric dermatology.     Scheduling and Nurse Triage: Dr. Howard sees pediatric patients on Mondays in [place] and adult and pediatric patients on Tuesdays in [place]. The remainder of the week she practices at the [facility]. Please call the above phone numbers to schedule or to talk to a nurse.     -For scheduling at the [place] or [place] locations, or to talk to the triage nurse please call the above phone number at the clinic where you were seen.     -For medication refills, please call your pharmacy.           Gentle Skin Care  Below is a list of products our providers recommend for gentle skin care.  Moisturizers:    Lighter; Cetaphil Cream, CeraVe, Aveeno and Vanicream Light     Thicker; Aquaphor Ointment, Vaseline, Petrolium Jelly, Eucerin and Vanicream    Avoid Lotions (too thin)  Mild Cleansers:    Dove- Fragrance Free    CeraVe     Vanicream Cleansing Bar    Cetaphil Cleanser     Aquaphor 2 in1 Gentle Wash and Shampoo       Laundry Products:    All Free and Clear    Cheer Free    Generic Brands are okay as long as they are  Fragrance Free      Avoid fabric softeners  and dryer sheets    "Sunscreens: SPF 30 or greater     Sunscreens that contain Zinc Oxide or Titanium Dioxide should be applied, these are physical blockers. Spray or  chemical  sunscreens should be avoided.        Shampoo and Conditioners:    Free and Clear by Vanicream    Aquaphor 2 in 1 Gentle Wash and Shampoo    California Baby  super sensitive   Oils:    Mineral Oil     Emu Oil     For some patients, coconut and sunflower seed oil      Generic Products are an okay substitute, but make sure they are fragrance free.  *Avoid product that have fragrance added to them. Organic does not mean  fragrance free.  In fact patients with sensitive skin can become quite irritated by organic products.     1. Daily bathing is recommended. Make sure you are applying a good moisturizer after bathing every time.  2. Use Moisturizing creams at least twice daily to the whole body. Your provider may recommend a lighter or heavier moisturizer based on your child s severity and that time of year it is.  3. Creams are more moisturizing than lotions  4. Products should be fragrance free- soaps, creams, detergents.  Products such as Allen and Allen as well as the Cetaphil \"Baby\" line contain fragrance and may irritate your child's sensitive skin.    Care Plan:  1. Keep bathing and showering short, less than 15 minutes   2. Always use lukewarm warm when possible. AVOID very HOT or COLD water  3. DO NOT use bubble bath  4. Limit the use of soaps. Focus on the skin folds, face, armpits, groin and feet  5. Do NOT vigorously scrub when you cleanse your skin  6. After bathing, PAT your skin lightly with a towel. DO NOT rub or scrub when drying  7. ALWAYS apply a moisturizer immediately after bathing. This helps to  lock in  the moisture. * IF YOU WERE PRESCRIBED A TOPICAL MEDICATION, APPLY YOUR MEDICATION FIRST THEN COVER WITH YOUR DAILY MOISTURIZER  8. Reapply moisturizing agents at least twice daily to your whole body  9. Do not use products such as powders, " perfumes, or colognes on your skin  10. Avoid saunas and steam baths. This temperature is too HOT  11. Avoid tight or  scratchy  clothing such as wool  12. Always wash new clothing before wearing them for the first time  13. Sometimes a humidifier or vaporizer can be used at night can help the dry skin. Remember to keep it clean to avoid mold growth.        Hand Dermatitis:  The hands are exposed to more irritants than other body parts, which makes them a common place for dry skin and rashes.  Frequent wetting of the hands and washing can make this worse.      Try these strategies:  1. Make sure that all of your products are hypoallergenic/fragrance free (see the gentle skin care instructions)  2. Moisturize the hands frequently, especially after handwashing.  Consider sending moisturizer with your child to school.  3. Choose very thick products for overnight. Vaseline and other similar greasy ointments are best.  4. Consider covering the hands with white cotton gloves for a few hours in the evening or even overnight while sleeping  5. If your doctor has given a prescription medication for the hand rash, apply this first, then apply a thick coating of moisturizer  6. Minimize handwashing when possible (but always hand wash after using the restroom and before meals)  7. Remove harsh soaps from bathrooms, rachael, and other places your child watches his/her hands.    -Most  pump  hand soaps (including the brand Softsoap but not limited too) contain detergents that strip the natural oils from the skin. An example of this is; dish detergent which makes a lot of suds which is used to strip the grease from dishes. These detergents do the same thing to the oils on the hands.    -Replace harsh or high-sudsing soaps with a gentle liquid cleanser or mild bar soap.   -Organic or homemade soaps may also worsen hand dermatitis if they contain plant materials or fragrance.   8. Avoid using pre-moistened or baby wipes on the  hands. These contain preservatives and ingredients that can cause skin irritation or allergy.        Skin Biopsy    Biopsy - How to take care of the site?    Keep the biopsy site dry and covered for 24 hours.     After 24 hours you may remove the bandage and clean the site (in the bathtub or shower)     If any discomfort occurs after the local anesthetic wears off, acetaminophen (i.e. Tylenol) may be given.    Apply the vaseline at least once a day with a cotton swab or a clean finger, and keep the site covered with a bandage.     If you are unable to cover the site with a bandage, re-apply ointment 2-3 times a day to keep the site moist. We do NOT want crusting of the site. Moisture will help with healing.    The best time to do wound care is after a shower or bath. You may shower or bathe the day after the biopsy and you can get the site wet. However, keep the force of the water off the biopsy site. Do not soak the area in water.    Change the bandage if it gets wet or sweaty.     A small scab will form and fall off by itself when the area is completely healed. The area will be red, and will become pink in color as it heals. Sun protection is very important for how your scar will heal. Either cover the scar from the sun or wear sunscreen SPF 30 or greater.     AVOID lake swimming until the sutures are removed if you have stiches.     You may swim in a chlorinated pool after your sutures have been in for 5 days. Try to use an occlusive bandage but if not, remove the bandage immediately after swimming and clean the site with a gentle cleanser and redress the site.     If a small amount of bleeding is noticed, place a clean cloth over the area and apply constant firm pressure for 15 minutes-- no peeking! Should the bleeding become heavier or not stop, call the clinic at [phone] or call [phone] to have the Dermatology Resident On-Call paged if after clinic hours, holiday or weekend.    Call us if have any of  "the following:    Thick, yellow or pus-like wound drainage (clear, or slightly yellow drainage is ok)    Fevers greater than 100 degrees Fahrenheit    Spreading redness or warmth at the biopsy site     The biopsy results can take 2-3 weeks to come back. The clinic will call you with the results unless you have a scheduled follow up appointment, then the results will be discussed at that time.           What is a skin biopsy and the difference between the two?  A skin biopsy allows the doctor to examine a very small piece of tissue under the microscope to determine the most appropriate diagnosis and the best treatment for the skin condition. A local anesthetic, similar to the kind that your dentist uses when they fill a cavity, is injected with a very small needle into the skin area to be tested. The skin and tissue underneath is now, \"asleep\" or numb and no pain is felt.     Punch Skin Biopsy:  An instrument shaped like a tiny cookie cutter (punch biopsy instrument) is used to cut a small round piece of tissue and skin from the area. A slight amount of bleeding may occur. Usually, a stitch is used to close the wound.     Shave Skin Biopsy:  This is a more superficial type of test, like a deep  scrape  in the skin.  It does not require a stitch.          Follow-ups after your visit        Your next 10 appointments already scheduled     Feb 20, 2018  3:20 PM CST   Pre-Op physical with KENNEDY Ruby CNP   [facility] [place] ([facility])    [address] [place] [address]  [address]  [facility] [address] [phone]              Who to contact     If you have questions or need follow up information about today's clinic visit or your schedule please contact [facility] directly at [phone].  Normal or non-critical lab and imaging results will be communicated to you by MyChart, letter or phone within 4 business days after the clinic has received the results. If you do not " "hear from us within 7 days, please contact the clinic through [employer] or phone. If you have a critical or abnormal lab result, we will notify you by phone as soon as possible.  Submit refill requests through [employer] or call your pharmacy and they will forward the refill request to us. Please allow 3 business days for your refill to be completed.          Additional Information About Your Visit        Trackway[employer]PAAY Information     [employer] lets you send messages to your doctor, view your test results, renew your prescriptions, schedule appointments and more. To sign up, go to [email or web address] . Click on \"Log in\" on the left side of the screen, which will take you to the Welcome page. Then click on \"Sign up Now\" on the right side of the page.     You will be asked to enter the access code listed below, as well as some personal information. Please follow the directions to create your username and password.     Your access code is: [email or web address]  Expires: 2018  1:34 PM     Your access code will  in 90 days. If you need help or a new code, please call your [facility] clinic or [phone].        Care EveryWhere ID     This is your Care EveryWhere ID. This could be used by other organizations to access your [facility] medical records  [id number]         Blood Pressure from Last 3 Encounters:   18 142/84   17 144/88   17 144/84    Weight from Last 3 Encounters:   18 177 lb 8 oz (80.5 kg)   17 177 lb 8 oz (80.5 kg)   17 171 lb 4.8 oz (77.7 kg)              Today, you had the following     No orders found for display         Today's Medication Changes          These changes are accurate as of 18  1:41 PM.  If you have any questions, ask your nurse or doctor.               Start taking these medicines.        Dose/Directions    hydrocortisone 2.5 % ointment   Used for:  Dermatitis   Started by:  Corrine Howard MD        Twice daily as needed to rash by mouth   Quantity: "  30 g   Refills:  1       mometasone 0.1 % ointment   Commonly known as:  ELOCON   Used for:  Dermatitis   Started by:  Corrine Howard MD        Twice daily to hand rash until clear, then as needed.   Quantity:  90 g   Refills:  2            Where to get your medicines      These medications were sent to [id number] - [place], [place] - [address]  [address], [place] [address]     Phone:  [phone]     hydrocortisone 2.5 % ointment    mometasone 0.1 % ointment                Primary Care Provider Office Phone # Fax #    Lien Sharmaneo Howard, APRN [facility] [phone] [address] [place] [address]        Equal Access to Services     [facility] : Murphy grimmo Soricky, waaxda luqadaha, qaybta kaalmada adeegyada, leighann zambrano . So [facility] [phone].    ATENCIÓN: Si habla español, tiene a arevalo disposición servicios gratuitos de asistencia lingüística. [facility] [phone].    We comply with applicable federal civil rights laws and [place] laws. We do not discriminate on the basis of race, color, national origin, age, disability, sex, sexual orientation, or gender identity.            Thank you!     Thank you for choosing [facility]  for your care. Our goal is always to provide you with excellent care. Hearing back from our patients is one way we can continue to improve our services. Please take a few minutes to complete the written survey that you may receive in the mail after your visit with us. Thank you!             Your Updated Medication List - Protect others around you: Learn how to safely use, store and throw away your medicines at www.disposemymeds.org.          This list is accurate as of 2/6/18  1:41 PM.  Always use your most recent med list.                   Brand Name Dispense Instructions for use Diagnosis    albuterol 108 (90 BASE) MCG/ACT Inhaler    PROAIR HFA/PROVENTIL  HFA/VENTOLIN HFA    1 Inhaler    Inhale 2 puffs into the lungs every 6 hours    Moderate persistent asthma without complication       azelastine 0.1 % spray    ASTELIN    1 Bottle    Spray 1-2 sprays into both nostrils 2 times daily    Seasonal allergic rhinitis due to other allergic trigger, unspecified chronicity       CEPHALEXIN PO       Dermatitis       fluticasone 50 MCG/ACT spray    FLONASE     Spray 1 spray into both nostrils daily    Moderate persistent asthma without complication       hydrochlorothiazide 12.5 MG Tabs tablet     90 tablet    Take 1 tablet (12.5 mg) by mouth daily    Benign essential hypertension       hydrocortisone 2.5 % ointment     30 g    Twice daily as needed to rash by mouth    Dermatitis       mometasone 0.1 % ointment    ELOCON    90 g    Twice daily to hand rash until clear, then as needed.    Dermatitis       mometasone-formoterol 200-5 MCG/ACT oral inhaler    DULERA    2 Inhaler    Inhale 2 puffs into the lungs 2 times daily    Moderate persistent asthma without complication       PREDNISONE PO      Take 40 mg by mouth    Dermatitis       VITAMIN D (CHOLECALCIFEROL) PO      Take by mouth daily        XYZAL PO

## 2018-02-06 NOTE — PATIENT INSTRUCTIONS
Pediatric Dermatology  [facility]  [address]  1st [place]  [address]  Phone: [phone]    Pediatric & Adult Dermatology  [facility]  [address]   2nd Floor  [facility] [address]  [phone]                  General information: Dr. Corrine Howard is a board-certified dermatologist with subspecialty certification in pediatric dermatology.     Scheduling and Nurse Triage: Dr. Howard sees pediatric patients on Mondays in [place] and adult and pediatric patients on Tuesdays in [place]. The remainder of the week she practices at the [facility]. Please call the above phone numbers to schedule or to talk to a nurse.     -For scheduling at the [place] or [place] locations, or to talk to the triage nurse please call the above phone number at the clinic where you were seen.     -For medication refills, please call your pharmacy.           Gentle Skin Care  Below is a list of products our providers recommend for gentle skin care.  Moisturizers:    Lighter; Cetaphil Cream, CeraVe, Aveeno and Vanicream Light     Thicker; Aquaphor Ointment, Vaseline, Petrolium Jelly, Eucerin and Vanicream    Avoid Lotions (too thin)  Mild Cleansers:    Dove- Fragrance Free    CeraVe     Vanicream Cleansing Bar    Cetaphil Cleanser     Aquaphor 2 in1 Gentle Wash and Shampoo       Laundry Products:    All Free and Clear    Cheer Free    Generic Brands are okay as long as they are  Fragrance Free      Avoid fabric softeners  and dryer sheets   Sunscreens: SPF 30 or greater     Sunscreens that contain Zinc Oxide or Titanium Dioxide should be applied, these are physical blockers. Spray or  chemical  sunscreens should be avoided.        Shampoo and Conditioners:    Free and Clear by Vanicream    Aquaphor 2 in 1 Gentle Wash and Shampoo    California Baby  super sensitive    "Oils:    Mineral Oil     Emu Oil     For some patients, coconut and sunflower seed oil      Generic Products are an okay substitute, but make sure they are fragrance free.  *Avoid product that have fragrance added to them. Organic does not mean  fragrance free.  In fact patients with sensitive skin can become quite irritated by organic products.     1. Daily bathing is recommended. Make sure you are applying a good moisturizer after bathing every time.  2. Use Moisturizing creams at least twice daily to the whole body. Your provider may recommend a lighter or heavier moisturizer based on your child s severity and that time of year it is.  3. Creams are more moisturizing than lotions  4. Products should be fragrance free- soaps, creams, detergents.  Products such as Allen and Allen as well as the Cetaphil \"Baby\" line contain fragrance and may irritate your child's sensitive skin.    Care Plan:  1. Keep bathing and showering short, less than 15 minutes   2. Always use lukewarm warm when possible. AVOID very HOT or COLD water  3. DO NOT use bubble bath  4. Limit the use of soaps. Focus on the skin folds, face, armpits, groin and feet  5. Do NOT vigorously scrub when you cleanse your skin  6. After bathing, PAT your skin lightly with a towel. DO NOT rub or scrub when drying  7. ALWAYS apply a moisturizer immediately after bathing. This helps to  lock in  the moisture. * IF YOU WERE PRESCRIBED A TOPICAL MEDICATION, APPLY YOUR MEDICATION FIRST THEN COVER WITH YOUR DAILY MOISTURIZER  8. Reapply moisturizing agents at least twice daily to your whole body  9. Do not use products such as powders, perfumes, or colognes on your skin  10. Avoid saunas and steam baths. This temperature is too HOT  11. Avoid tight or  scratchy  clothing such as wool  12. Always wash new clothing before wearing them for the first time  13. Sometimes a humidifier or vaporizer can be used at night can help the dry skin. Remember to keep it clean " to avoid mold growth.        Hand Dermatitis:  The hands are exposed to more irritants than other body parts, which makes them a common place for dry skin and rashes.  Frequent wetting of the hands and washing can make this worse.      Try these strategies:  1. Make sure that all of your products are hypoallergenic/fragrance free (see the gentle skin care instructions)  2. Moisturize the hands frequently, especially after handwashing.  Consider sending moisturizer with your child to school.  3. Choose very thick products for overnight. Vaseline and other similar greasy ointments are best.  4. Consider covering the hands with white cotton gloves for a few hours in the evening or even overnight while sleeping  5. If your doctor has given a prescription medication for the hand rash, apply this first, then apply a thick coating of moisturizer  6. Minimize handwashing when possible (but always hand wash after using the restroom and before meals)  7. Remove harsh soaps from bathrooms, rachael, and other places your child watches his/her hands.    -Most  pump  hand soaps (including the brand Softsoap but not limited too) contain detergents that strip the natural oils from the skin. An example of this is; dish detergent which makes a lot of suds which is used to strip the grease from dishes. These detergents do the same thing to the oils on the hands.    -Replace harsh or high-sudsing soaps with a gentle liquid cleanser or mild bar soap.   -Organic or homemade soaps may also worsen hand dermatitis if they contain plant materials or fragrance.   8. Avoid using pre-moistened or baby wipes on the hands. These contain preservatives and ingredients that can cause skin irritation or allergy.        Skin Biopsy    Biopsy - How to take care of the site?    Keep the biopsy site dry and covered for 24 hours.     After 24 hours you may remove the bandage and clean the site (in the bathtub or shower)     If any discomfort occurs after  the local anesthetic wears off, acetaminophen (i.e. Tylenol) may be given.    Apply the vaseline at least once a day with a cotton swab or a clean finger, and keep the site covered with a bandage.     If you are unable to cover the site with a bandage, re-apply ointment 2-3 times a day to keep the site moist. We do NOT want crusting of the site. Moisture will help with healing.    The best time to do wound care is after a shower or bath. You may shower or bathe the day after the biopsy and you can get the site wet. However, keep the force of the water off the biopsy site. Do not soak the area in water.    Change the bandage if it gets wet or sweaty.     A small scab will form and fall off by itself when the area is completely healed. The area will be red, and will become pink in color as it heals. Sun protection is very important for how your scar will heal. Either cover the scar from the sun or wear sunscreen SPF 30 or greater.     AVOID lake swimming until the sutures are removed if you have stiches.     You may swim in a chlorinated pool after your sutures have been in for 5 days. Try to use an occlusive bandage but if not, remove the bandage immediately after swimming and clean the site with a gentle cleanser and redress the site.     If a small amount of bleeding is noticed, place a clean cloth over the area and apply constant firm pressure for 15 minutes-- no peeking! Should the bleeding become heavier or not stop, call the clinic at [phone] or call [phone] to have the Dermatology Resident On-Call paged if after clinic hours, holiday or weekend.    Call us if have any of the following:    Thick, yellow or pus-like wound drainage (clear, or slightly yellow drainage is ok)    Fevers greater than 100 degrees Fahrenheit    Spreading redness or warmth at the biopsy site     The biopsy results can take 2-3 weeks to come back. The clinic will call you with the results unless you have a scheduled follow up  "appointment, then the results will be discussed at that time.           What is a skin biopsy and the difference between the two?  A skin biopsy allows the doctor to examine a very small piece of tissue under the microscope to determine the most appropriate diagnosis and the best treatment for the skin condition. A local anesthetic, similar to the kind that your dentist uses when they fill a cavity, is injected with a very small needle into the skin area to be tested. The skin and tissue underneath is now, \"asleep\" or numb and no pain is felt.     Punch Skin Biopsy:  An instrument shaped like a tiny cookie cutter (punch biopsy instrument) is used to cut a small round piece of tissue and skin from the area. A slight amount of bleeding may occur. Usually, a stitch is used to close the wound.     Shave Skin Biopsy:  This is a more superficial type of test, like a deep  scrape  in the skin.  It does not require a stitch.  "

## 2018-02-06 NOTE — NURSING NOTE
"Chief Complaint   Patient presents with     Consult     new pt ref by Dr. Holliday and Lien Howard      Skin Check     skin and mole check and hx of excision of moles        Initial There were no vitals taken for this visit. Estimated body mass index is 27.8 kg/(m^2) as calculated from the following:    Height as of 1/9/18: 5' 7\" (170.2 cm).    Weight as of 1/9/18: 177 lb 8 oz (80.5 kg).  Medication Reconciliation: complete   Ashely Morris MA      "

## 2018-02-07 NOTE — PROGRESS NOTES
DERMATOLOGY CLINIC VISIT NOTE      Service Date: 02/06/2018      CHIEF COMPLAINT:  Skin check.      DERMATOLOGY PROBLEM LIST:   1.  History of dysplastic nevus on abdomen, removed in [place] approximately 10 years ago.   2.  Multiple pigmented nevi.   3.  Seborrheic keratoses.   4.  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   5. Dermatitis of hands and lower face     HISTORY OF PRESENT ILLNESS:  Ms. Caal is a 57-year-old female presenting to Dermatology Clinic, seen today at the request of Lien Howard for skin check.  Ms. Caal notes that approximately 10 years ago she was diagnosed with a dysplastic nevus on her right lower abdomen.  This required reexcision.  This was performed in [place].  She has had multiple other moles removed at various times in her life, but none of which have been abnormal.  She describes that she develops intermittent scaling of her lower cutaneous lip.  She has tried a variety of lotions, none of which have been effective.  She also develops intermittent hand redness and scaling.      She has no painful or bleeding areas today.  No new moles.      Patient Active Problem List   Diagnosis     Moderate persistent asthma without complication     Anxiety     Benign essential hypertension       Allergies   Allergen Reactions     Fentanyl Hives     Itchiness, nausea         Current Outpatient Prescriptions   Medication     PREDNISONE PO     CEPHALEXIN PO     hydrocortisone 2.5 % ointment     mometasone (ELOCON) 0.1 % ointment     hydrochlorothiazide 12.5 MG TABS tablet     mometasone-formoterol (DULERA) 200-5 MCG/ACT oral inhaler     Levocetirizine Dihydrochloride (XYZAL PO)     fluticasone (FLONASE) 50 MCG/ACT spray     VITAMIN D, CHOLECALCIFEROL, PO     azelastine (ASTELIN) 0.1 % spray     albuterol (PROAIR HFA/PROVENTIL HFA/VENTOLIN HFA) 108 (90 BASE) MCG/ACT Inhaler     No current facility-administered medications for this visit.          SOCIAL HISTORY:  The  patient is .  She lives in [place].      FAMILY HISTORY:  Mother with squamous cell carcinoma.      REVIEW OF SYSTEMS:  Feels well without additional skin concerns today.      PHYSICAL EXAMINATION:   GENERAL:  The patient is a healthy-appearing 57-year-old female in no distress.   HEENT:  Conjunctivae are clear.   PULMONARY:  Breathing comfortably on room air.   ABDOMEN:  No abdominal distention.   CARDIOVASCULAR:  Extremities warm and well-perfused.   SKIN:  Examination today included the scalp, face, neck, chest, abdomen, back, arms, legs, hands, feet, buttocks.  Skin exam was normal except for as follows:   -Examination of the left lateral cheek, the nasal tip with light brown, evenly pigmented, approximately 1 cm patches.   -Scattered smaller 2-4 mm light brown patches on the lateral cheeks, the superior shoulders, the anterior chest and the upper back.   -Examination of the chest, upper and lower back and buttocks with greater than 50 approximately 2 mm macules, most with dark brown to black pigmentation centrally and medium brown pigment at the periphery with net-like pigment pattern.   -Biopsy scars on the upper and lower back and the right abdomen.   -Cherry red papules on the anterior chest and abdomen.   -Scattered waxy and tan papules on the bilateral lower extremities.   -Right temple with approximately 1 cm pink scaling papule that bleeds easily when excoriated.   -Examination of the left lower cutaneous lip with xerotic patch with overlying white scale in the background of erythema.   -Bilateral hands with erythema and scale with mild induration.     PROCEDURE NOTE:  The patient consented to a shave biopsy on the right temple.  Area infiltrated with 1 mL of lidocaine with epinephrine.  Shave biopsy of the center of the lesion performed.  Aluminum chloride, petrolatum and a bandage applied.  Wound care info provided.      ASSESSMENT AND PLAN:   1.  Dermatitis of the left lower cutaneous lip and  bilateral hands:  Differential diagnosis would include irritant versus allergic contact dermatitis.  I recommended gentle skin cares, washing with mild soap, application of hydrocortisone 2.5% ointment to rash on the face and mometasone 0.1% ointment twice daily to hand rash until clear and then as needed.  If recurrent, would consider patch testing.     2.  Seborrheic keratoses, benign, hyperkeratotic papules and plaques.  No treatment advised.     3.  History of dysplastic nevus on abdomen:  No signs of recurrence.     4.  Multiple pigmented nevi:  Ms. Streeter has nevi that are very dark in pigmentation but uniform and consistent overall.  No outliers seen on exam today.  Recommended yearly skin check.     5.  Solar lentigines:  Marker of past solar injury.  Recommended ongoing sun protection.     6.  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  Differential diagnosis would include a traumatized seborrheic keratosis versus basal cell carcinoma.  We will contact patient with biopsy results when available.           The patient to return in 1 year's time, sooner pending biopsy results.      Thank you for this consultation.     Duarte Howard MD  Dermatology Staff       cc:   [place] [address]         DUARTE HOWARD MD             D: 2018   T: 2018   MT: al      Name:     KENNY STREETER   MRN:      [id number]        Account:      [id number]   :      1960           Service Date: 2018      Document: [id number]

## 2018-02-08 PROBLEM — L30.9 DERMATITIS: Status: ACTIVE | Noted: 2018-02-08

## 2018-02-08 PROBLEM — D22.9 MULTIPLE NEVI: Status: ACTIVE | Noted: 2018-02-08

## 2018-02-08 PROBLEM — L81.4 SOLAR LENTIGINOSIS: Status: ACTIVE | Noted: 2018-02-08

## 2018-02-08 PROBLEM — L82.1 SEBORRHEIC KERATOSIS: Status: ACTIVE | Noted: 2018-02-08

## 2018-02-08 PROBLEM — D48.9 NEOPLASM OF UNCERTAIN BEHAVIOR: Status: ACTIVE | Noted: 2018-02-08

## 2018-02-09 LAB — COPATH REPORT: NORMAL

## 2018-02-14 ENCOUNTER — TRANSFERRED RECORDS (OUTPATIENT)
Dept: HEALTH INFORMATION MANAGEMENT | Facility: CLINIC | Age: 58
End: 2018-02-14

## 2018-02-20 ENCOUNTER — OFFICE VISIT (OUTPATIENT)
Dept: PEDIATRICS | Facility: CLINIC | Age: 58
End: 2018-02-20
Payer: COMMERCIAL

## 2018-02-20 VITALS
OXYGEN SATURATION: 96 % | HEART RATE: 81 BPM | WEIGHT: 170.3 LBS | TEMPERATURE: 98.7 F | SYSTOLIC BLOOD PRESSURE: 128 MMHG | HEIGHT: 67 IN | DIASTOLIC BLOOD PRESSURE: 78 MMHG | BODY MASS INDEX: 26.73 KG/M2

## 2018-02-20 DIAGNOSIS — E83.52 SERUM CALCIUM ELEVATED: ICD-10-CM

## 2018-02-20 DIAGNOSIS — J33.9 NASAL POLYP: ICD-10-CM

## 2018-02-20 DIAGNOSIS — Z01.818 PREOP GENERAL PHYSICAL EXAM: Primary | ICD-10-CM

## 2018-02-20 DIAGNOSIS — Z87.74 HISTORY OF ARTERIOVENOUS MALFORMATION: ICD-10-CM

## 2018-02-20 DIAGNOSIS — Z79.899 ENCOUNTER FOR LONG-TERM (CURRENT) USE OF MEDICATIONS: ICD-10-CM

## 2018-02-20 PROCEDURE — 80048 BASIC METABOLIC PNL TOTAL CA: CPT | Performed by: NURSE PRACTITIONER

## 2018-02-20 PROCEDURE — 99214 OFFICE O/P EST MOD 30 MIN: CPT | Performed by: NURSE PRACTITIONER

## 2018-02-20 PROCEDURE — 93000 ELECTROCARDIOGRAM COMPLETE: CPT | Performed by: NURSE PRACTITIONER

## 2018-02-20 PROCEDURE — 36415 COLL VENOUS BLD VENIPUNCTURE: CPT | Performed by: NURSE PRACTITIONER

## 2018-02-20 NOTE — PROGRESS NOTES
[facility]  [address]  [address]  [place] [address] [phone]    PRE-OP EVALUATION:  Today's date: 2018    Ophelia Caal (: 1960) presents for pre-operative evaluation assessment as requested by Dr. Ritchie and Dr Regan.  She requires evaluation and anesthesia risk assessment prior to undergoing surgery/procedure for treatment of deviated septum & nasal polyps .    Fax number for surgical facility: [phone] [facility]  Primary Physician: Lien Howard  Type of Anesthesia Anticipated: General    Patient has a Health Care Directive or Living Will:  NO    Preop Questions 2018   Who is doing your surgery? dr ritchie and dr regan   What are you having done? sinus polyp removal and septum repair   Date of Surgery/Procedure:    Facility or Hospital where procedure/surgery will be performed: [facility]   1.  Do you have a history of Heart attack, stroke, stent, coronary bypass surgery, or other heart surgery? No   2.  Do you ever have any pain or discomfort in your chest? No   3.  Do you have a history of  Heart Failure? No   4.   Are you troubled by shortness of breath when:  walking on a level surface, or up a slight hill, or at night? No   5.  Do you currently have a cold, bronchitis or other respiratory infection? No   6.  Do you have a cough, shortness of breath, or wheezing? No   7.  Do you sometimes get pains in the calves of your legs when you walk? No   8. Do you or anyone in your family have previous history of blood clots? No   9.  Do you or does anyone in your family have a serious bleeding problem such as prolonged bleeding following surgeries or cuts? No   10. Have you ever had problems with anemia or been told to take iron pills? No   11. Have you had any abnormal blood loss such as black, tarry or bloody stools, or abnormal vaginal bleeding? No   12. Have you ever had a blood transfusion? YES    13. Have you  or any of your relatives ever had problems with anesthesia? YES (fentanyl)    14. Do you have sleep apnea, excessive snoring or daytime drowsiness? No   15. Do you have any prosthetic heart valves? No   16. Do you have prosthetic joints? No   17. Is there any chance that you may be pregnant? No         HPI:     HPI related to upcoming procedure: septum & nasal polyps      See problem list for active medical problems.  Problems all longstanding and stable, except as noted/documented.  See ROS for pertinent symptoms related to these conditions.                                                                                                  .    MEDICAL HISTORY:     Patient Active Problem List    Diagnosis Date Noted     Dermatitis 2018     Priority: Medium     Neoplasm of uncertain behavior 2018     Priority: Medium     Seborrheic keratosis 2018     Priority: Medium     Multiple nevi 2018     Priority: Medium     Solar lentiginosis 2018     Priority: Medium     Anxiety 2017     Priority: Medium     Benign essential hypertension 2017     Priority: Medium     Moderate persistent asthma without complication 2017     Priority: Medium      Past Medical History:   Diagnosis Date     Thyroid disease      Uncomplicated asthma      Past Surgical History:   Procedure Laterality Date     BREAST SURGERY      duct removal -       SECTION           HYSTERECTOMY      no cervix     HYSTERECTOMY, PAP NO LONGER INDICATED       SINUS SURGERY      deviated septum & polyp removal     TEMPORAL ARTERY LIGATN OR BX      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 90 g 2  "    hydrochlorothiazide 12.5 MG TABS tablet Take 1 tablet (12.5 mg) by mouth daily 90 tablet 0     mometasone-formoterol (DULERA) 200-5 MCG/ACT oral inhaler Inhale 2 puffs into the lungs 2 times daily 2 Inhaler 0     Levocetirizine Dihydrochloride (XYZAL PO)        fluticasone (FLONASE) 50 MCG/ACT spray Spray 1 spray into both nostrils daily       VITAMIN D, CHOLECALCIFEROL, PO Take by mouth daily       azelastine (ASTELIN) 0.1 % spray Spray 1-2 sprays into both nostrils 2 times daily (Patient not taking: Reported on 2/6/2018) 1 Bottle 1     albuterol (PROAIR HFA/PROVENTIL HFA/VENTOLIN HFA) 108 (90 BASE) MCG/ACT Inhaler Inhale 2 puffs into the lungs every 6 hours (Patient not taking: Reported on 1/9/2018) 1 Inhaler 3     OTC products: none    Allergies   Allergen Reactions     Fentanyl Hives     Itchiness, nausea      Latex Allergy: NO    Social History   Substance Use Topics     Smoking status: Never Smoker     Smokeless tobacco: Never Used     Alcohol use Yes     History   Drug Use No       REVIEW OF SYSTEMS:   Constitutional, neuro, ENT, endocrine, pulmonary, cardiac, gastrointestinal, genitourinary, musculoskeletal, integument and psychiatric systems are negative, except as otherwise noted.    EXAM:   /78 (Cuff Size: Adult Regular)  Pulse 81  Temp 98.7  F (37.1  C) (Tympanic)  Ht 5' 7\" (1.702 m)  Wt 170 lb 4.8 oz (77.2 kg)  SpO2 96%  BMI 26.67 kg/m2    GENERAL APPEARANCE: healthy, alert and no distress     EYES: EOMI, PERRL     HENT: ear canals and TM's normal and nose and mouth without ulcers or lesions     NECK: no adenopathy, no asymmetry, masses, or scars and thyroid normal to palpation     RESP: lungs clear to auscultation - no rales, rhonchi or wheezes     CV: regular rates and rhythm, normal S1 S2, no S3 or S4 and no murmur, click or rub     ABDOMEN:  soft, nontender, no HSM or masses and bowel sounds normal     MS: extremities normal- no gross deformities noted, no evidence of inflammation in " joints, FROM in all extremities.     SKIN: no suspicious lesions or rashes     NEURO: Normal strength and tone, sensory exam grossly normal, mentation intact and speech normal     PSYCH: mentation appears normal. and affect normal/bright     LYMPHATICS: No cervical adenopathy    DIAGNOSTICS:   EKG: appears normal, NSR, no LVH by voltage criteria, unchanged from previous tracings, left anterior fascicular block. No comparison available.     Recent Labs   Lab Test  01/09/18   1555  04/28/17   0722   NA  140  140   POTASSIUM  4.9  4.3   CR  0.95  0.78        IMPRESSION:   Reason for surgery/procedure: septum & nasal polyps    The proposed surgical procedure is considered INTERMEDIATE risk.    REVISED CARDIAC RISK INDEX  The patient has the following serious cardiovascular risks for perioperative complications such as (MI, PE, VFib and 3  AV Block):  No serious cardiac risks    The patient has the following additional risks for perioperative complications:  No identified additional risks      ICD-10-CM    1. Preop general physical exam Z01.818 EKG 12-lead complete w/read - Clinics   2. Nasal polyp J33.9    3. Encounter for long-term (current) use of medications Z79.899 Has been on prednisone most of the last 30 days. Day before surgery will be her last day of the taper.        RECOMMENDATIONS:       --Patient is to take all scheduled medications on the day of surgery EXCEPT for modifications listed below.    APPROVAL GIVEN to proceed with proposed procedure, without further diagnostic evaluation       Signed Electronically by: KENNEDY Ruby CNP    Copy of this evaluation report is provided to requesting physician.    [facility] Preop Guidelines

## 2018-02-20 NOTE — MR AVS SNAPSHOT
After Visit Summary   2/20/2018    Ophelia Caal    MRN: [id number]           Patient Information     Date Of Birth          1960        Visit Information        Provider Department      2/20/2018 3:20 PM Lien Howard APRN CNP [facility]        Today's Diagnoses     Preop general physical exam    -  1    Nasal polyp        Encounter for long-term (current) use of medications        History of arteriovenous malformation          Care Instructions      Before Your Surgery      Call your surgeon if there is any change in your health. This includes signs of a cold or flu (such as a sore throat, runny nose, cough, rash or fever).    Do not smoke, drink alcohol or take over the counter medicine (unless your surgeon or primary care doctor tells you to) for the 24 hours before and after surgery.    If you take prescribed drugs: Follow your doctor s orders about which medicines to take and which to stop until after surgery.    Eating and drinking prior to surgery: follow the instructions from your surgeon    Take a shower or bath the night before surgery. Use the soap your surgeon gave you to gently clean your skin. If you do not have soap from your surgeon, use your regular soap. Do not shave or scrub the surgery site.  Wear clean pajamas and have clean sheets on your bed.           Follow-ups after your visit        Who to contact     If you have questions or need follow up information about today's clinic visit or your schedule please contact [facility] directly at [phone].  Normal or non-critical lab and imaging results will be communicated to you by MyChart, letter or phone within 4 business days after the clinic has received the results. If you do not hear from us within 7 days, please contact the clinic through MyChart or phone. If you have a critical or abnormal lab result, we will notify you by phone as soon as possible.  Submit refill requests through  "MyChart or call your pharmacy and they will forward the refill request to us. Please allow 3 business days for your refill to be completed.          Additional Information About Your Visit        MyChart Information     [employer] lets you send messages to your doctor, view your test results, renew your prescriptions, schedule appointments and more. To sign up, go to [email or web address] . Click on \"Log in\" on the left side of the screen, which will take you to the Welcome page. Then click on \"Sign up Now\" on the right side of the page.     You will be asked to enter the access code listed below, as well as some personal information. Please follow the directions to create your username and password.     Your access code is: [email or web address]  Expires: 2018  1:34 PM     Your access code will  in 90 days. If you need help or a new code, please call your [facility] clinic or [phone].        Care EveryWhere ID     This is your Care EveryWhere ID. This could be used by other organizations to access your [facility] medical records  [id number]        Your Vitals Were     Pulse Temperature Height Pulse Oximetry BMI (Body Mass Index)       81 98.7  F (37.1  C) (Tympanic) 5' 7\" (1.702 m) 96% 26.67 kg/m2        Blood Pressure from Last 3 Encounters:   18 128/78   18 142/84   17 144/88    Weight from Last 3 Encounters:   18 170 lb 4.8 oz (77.2 kg)   18 177 lb 8 oz (80.5 kg)   17 177 lb 8 oz (80.5 kg)              We Performed the Following     Basic metabolic panel     EKG 12-lead complete w/read - Clinics        Primary Care Provider Office Phone # Fax #    KENNEDY Ruby [facility] [phone] [address] [place] [address]        Equal Access to Services     [facility] : Murphy Lombardi, wagiselleda curtqholley, qaybta delmyalleighann delaney. [facility] [phone].    ATENCIÓN: Si sathish wyatt, " tiene a arevalo disposición servicios gratuitos de asistencia lingüística. Robbie zamora [phone].    We comply with applicable federal civil rights laws and [place] laws. We do not discriminate on the basis of race, color, national origin, age, disability, sex, sexual orientation, or gender identity.            Thank you!     Thank you for choosing [facility]3] [place]  for your care. Our goal is always to provide you with excellent care. Hearing back from our patients is one way we can continue to improve our services. Please take a few minutes to complete the written survey that you may receive in the mail after your visit with us. Thank you!             Your Updated Medication List - Protect others around you: Learn how to safely use, store and throw away your medicines at www.disposemymeds.org.          This list is accurate as of 2/20/18  4:00 PM.  Always use your most recent med list.                   Brand Name Dispense Instructions for use Diagnosis    albuterol 108 (90 BASE) MCG/ACT Inhaler    PROAIR HFA/PROVENTIL HFA/VENTOLIN HFA    1 Inhaler    Inhale 2 puffs into the lungs every 6 hours    Moderate persistent asthma without complication       azelastine 0.1 % spray    ASTELIN    1 Bottle    Spray 1-2 sprays into both nostrils 2 times daily    Seasonal allergic rhinitis due to other allergic trigger, unspecified chronicity       fluticasone 50 MCG/ACT spray    FLONASE     Spray 1 spray into both nostrils daily    Moderate persistent asthma without complication       hydrochlorothiazide 12.5 MG Tabs tablet     90 tablet    Take 1 tablet (12.5 mg) by mouth daily    Benign essential hypertension       hydrocortisone 2.5 % ointment     30 g    Twice daily as needed to rash by mouth    Dermatitis       mometasone 0.1 % ointment    ELOCON    90 g    Twice daily to hand rash until clear, then as needed.    Dermatitis       mometasone-formoterol 200-5 MCG/ACT oral inhaler    DULERA    2 Inhaler    Inhale 2 puffs  into the lungs 2 times daily    Moderate persistent asthma without complication       PREDNISONE PO      Take 40 mg by mouth    Dermatitis       VITAMIN D (CHOLECALCIFEROL) PO      Take by mouth daily        XYZAL PO

## 2018-02-20 NOTE — LETTER
[address] [address]                  [phone]   February 21, 2018    Ophelia Caal  [address] [facility] [address]        Anselmo Jacinto,     I hope your surgery goes well.     Your calcium level was slightly elevated. This could just be a fluke, but I'd like to recheck it. Could you please schedule a lab visit? I'll let you know the results.     Also, your blood sugar is marked as high, but that's not true. 122 would be high if you were fasting, but if you had eaten at all before your test that's very normal. Just didn't want you to be alarmed.     Have a wonderful day!     Best,     Lien Howard, FNP-DNP.             Results for orders placed or performed in visit on 02/20/18   Basic metabolic panel   Result Value Ref Range    Sodium 137 133 - 144 mmol/L    Potassium 4.4 3.4 - 5.3 mmol/L    Chloride 102 94 - 109 mmol/L    Carbon Dioxide 26 20 - 32 mmol/L    Anion Gap 9 3 - 14 mmol/L    Glucose 122 (H) 70 - 99 mg/dL    Urea Nitrogen 21 7 - 30 mg/dL    Creatinine 0.76 0.52 - 1.04 mg/dL    GFR Estimate 79 >60 mL/min/1.7m2    GFR Estimate If Black >90 >60 mL/min/1.7m2    Calcium 10.3 (H) 8.5 - 10.1 mg/dL

## 2018-02-20 NOTE — NURSING NOTE
"Chief Complaint   Patient presents with     Pre-Op Exam       Initial /78 (Cuff Size: Adult Regular)  Pulse 81  Temp 98.7  F (37.1  C) (Tympanic)  Ht 5' 7\" (1.702 m)  Wt 170 lb 4.8 oz (77.2 kg)  SpO2 96%  BMI 26.67 kg/m2 Estimated body mass index is 26.67 kg/(m^2) as calculated from the following:    Height as of this encounter: 5' 7\" (1.702 m).    Weight as of this encounter: 170 lb 4.8 oz (77.2 kg).  Medication Reconciliation: complete   Shelby Villa CMA    "

## 2018-02-21 LAB
ANION GAP SERPL CALCULATED.3IONS-SCNC: 9 MMOL/L (ref 3–14)
BUN SERPL-MCNC: 21 MG/DL (ref 7–30)
CALCIUM SERPL-MCNC: 10.3 MG/DL (ref 8.5–10.1)
CHLORIDE SERPL-SCNC: 102 MMOL/L (ref 94–109)
CO2 SERPL-SCNC: 26 MMOL/L (ref 20–32)
CREAT SERPL-MCNC: 0.76 MG/DL (ref 0.52–1.04)
GFR SERPL CREATININE-BSD FRML MDRD: 79 ML/MIN/1.7M2
GLUCOSE SERPL-MCNC: 122 MG/DL (ref 70–99)
POTASSIUM SERPL-SCNC: 4.4 MMOL/L (ref 3.4–5.3)
SODIUM SERPL-SCNC: 137 MMOL/L (ref 133–144)

## 2018-04-10 DIAGNOSIS — I10 BENIGN ESSENTIAL HYPERTENSION: ICD-10-CM

## 2018-04-10 NOTE — TELEPHONE ENCOUNTER
Patient calling requesting refill.    Hydrochlorothiazide  Last Written Prescription Date:  1/9/18  Last Fill Quantity: 90,  # refills: 0   Last office visit: 2/20/2018 with prescribing provider:  Lien Howard   Future Office Visit:

## 2018-04-11 RX ORDER — HYDROCHLOROTHIAZIDE 12.5 MG/1
12.5 TABLET ORAL DAILY
Qty: 90 TABLET | Refills: 0 | Status: SHIPPED | OUTPATIENT
Start: 2018-04-11 | End: 2018-08-14

## 2018-04-11 NOTE — TELEPHONE ENCOUNTER
Prescription approved per [facility] Refill Protocol.    Laura Ford, RN  Patient Care Supervisor  [facility]  [phone]

## 2018-04-19 ENCOUNTER — TRANSFERRED RECORDS (OUTPATIENT)
Dept: HEALTH INFORMATION MANAGEMENT | Facility: CLINIC | Age: 58
End: 2018-04-19

## 2018-06-14 ENCOUNTER — TRANSFERRED RECORDS (OUTPATIENT)
Dept: HEALTH INFORMATION MANAGEMENT | Facility: CLINIC | Age: 58
End: 2018-06-14

## 2018-08-14 ENCOUNTER — OFFICE VISIT (OUTPATIENT)
Dept: PEDIATRICS | Facility: CLINIC | Age: 58
End: 2018-08-14
Payer: COMMERCIAL

## 2018-08-14 VITALS
BODY MASS INDEX: 28 KG/M2 | DIASTOLIC BLOOD PRESSURE: 96 MMHG | SYSTOLIC BLOOD PRESSURE: 144 MMHG | WEIGHT: 178.8 LBS | HEART RATE: 76 BPM | TEMPERATURE: 98 F

## 2018-08-14 DIAGNOSIS — J45.40 MODERATE PERSISTENT ASTHMA WITHOUT COMPLICATION: ICD-10-CM

## 2018-08-14 DIAGNOSIS — R79.89 ELEVATED PARATHYROID HORMONE: ICD-10-CM

## 2018-08-14 DIAGNOSIS — E78.2 MIXED HYPERLIPIDEMIA: ICD-10-CM

## 2018-08-14 DIAGNOSIS — R00.2 PALPITATIONS: ICD-10-CM

## 2018-08-14 DIAGNOSIS — E83.52 SERUM CALCIUM ELEVATED: ICD-10-CM

## 2018-08-14 DIAGNOSIS — Z00.00 HEALTH CARE MAINTENANCE: Primary | ICD-10-CM

## 2018-08-14 DIAGNOSIS — I10 BENIGN ESSENTIAL HYPERTENSION: ICD-10-CM

## 2018-08-14 LAB
ALBUMIN SERPL-MCNC: 4 G/DL (ref 3.4–5)
ALP SERPL-CCNC: 74 U/L (ref 40–150)
ALT SERPL W P-5'-P-CCNC: 32 U/L (ref 0–50)
ANION GAP SERPL CALCULATED.3IONS-SCNC: 11 MMOL/L (ref 3–14)
AST SERPL W P-5'-P-CCNC: 18 U/L (ref 0–45)
BASOPHILS # BLD AUTO: 0 10E9/L (ref 0–0.2)
BASOPHILS NFR BLD AUTO: 0.2 %
BILIRUB SERPL-MCNC: 0.6 MG/DL (ref 0.2–1.3)
BUN SERPL-MCNC: 19 MG/DL (ref 7–30)
CALCIUM SERPL-MCNC: 9.6 MG/DL (ref 8.5–10.1)
CHLORIDE SERPL-SCNC: 105 MMOL/L (ref 94–109)
CHOLEST SERPL-MCNC: 324 MG/DL
CO2 SERPL-SCNC: 24 MMOL/L (ref 20–32)
CREAT SERPL-MCNC: 0.79 MG/DL (ref 0.52–1.04)
CREAT UR-MCNC: 253 MG/DL
DIFFERENTIAL METHOD BLD: NORMAL
EOSINOPHIL # BLD AUTO: 0.1 10E9/L (ref 0–0.7)
EOSINOPHIL NFR BLD AUTO: 0.8 %
ERYTHROCYTE [DISTWIDTH] IN BLOOD BY AUTOMATED COUNT: 13.7 % (ref 10–15)
GFR SERPL CREATININE-BSD FRML MDRD: 75 ML/MIN/1.7M2
GLUCOSE SERPL-MCNC: 90 MG/DL (ref 70–99)
HCT VFR BLD AUTO: 42.9 % (ref 35–47)
HDLC SERPL-MCNC: 57 MG/DL
HGB BLD-MCNC: 14.2 G/DL (ref 11.7–15.7)
LDLC SERPL CALC-MCNC: ABNORMAL MG/DL
LYMPHOCYTES # BLD AUTO: 3.2 10E9/L (ref 0.8–5.3)
LYMPHOCYTES NFR BLD AUTO: 38.3 %
MCH RBC QN AUTO: 32.3 PG (ref 26.5–33)
MCHC RBC AUTO-ENTMCNC: 33.1 G/DL (ref 31.5–36.5)
MCV RBC AUTO: 98 FL (ref 78–100)
MICROALBUMIN UR-MCNC: 15 MG/L
MICROALBUMIN/CREAT UR: 6.09 MG/G CR (ref 0–25)
MONOCYTES # BLD AUTO: 0.4 10E9/L (ref 0–1.3)
MONOCYTES NFR BLD AUTO: 5.2 %
NEUTROPHILS # BLD AUTO: 4.6 10E9/L (ref 1.6–8.3)
NEUTROPHILS NFR BLD AUTO: 55.5 %
NONHDLC SERPL-MCNC: 267 MG/DL
PLATELET # BLD AUTO: 216 10E9/L (ref 150–450)
POTASSIUM SERPL-SCNC: 3.7 MMOL/L (ref 3.4–5.3)
PROT SERPL-MCNC: 7.6 G/DL (ref 6.8–8.8)
PTH-INTACT SERPL-MCNC: 128 PG/ML (ref 18–80)
RBC # BLD AUTO: 4.39 10E12/L (ref 3.8–5.2)
SODIUM SERPL-SCNC: 140 MMOL/L (ref 133–144)
T4 FREE SERPL-MCNC: 0.94 NG/DL (ref 0.76–1.46)
TRIGL SERPL-MCNC: 513 MG/DL
TSH SERPL DL<=0.005 MIU/L-ACNC: 4.62 MU/L (ref 0.4–4)
WBC # BLD AUTO: 8.3 10E9/L (ref 4–11)

## 2018-08-14 PROCEDURE — 84439 ASSAY OF FREE THYROXINE: CPT | Performed by: NURSE PRACTITIONER

## 2018-08-14 PROCEDURE — 36415 COLL VENOUS BLD VENIPUNCTURE: CPT | Performed by: NURSE PRACTITIONER

## 2018-08-14 PROCEDURE — 99214 OFFICE O/P EST MOD 30 MIN: CPT | Mod: 25 | Performed by: NURSE PRACTITIONER

## 2018-08-14 PROCEDURE — 82043 UR ALBUMIN QUANTITATIVE: CPT | Performed by: NURSE PRACTITIONER

## 2018-08-14 PROCEDURE — 80061 LIPID PANEL: CPT | Performed by: NURSE PRACTITIONER

## 2018-08-14 PROCEDURE — 84443 ASSAY THYROID STIM HORMONE: CPT | Performed by: NURSE PRACTITIONER

## 2018-08-14 PROCEDURE — 83970 ASSAY OF PARATHORMONE: CPT | Performed by: NURSE PRACTITIONER

## 2018-08-14 PROCEDURE — 83721 ASSAY OF BLOOD LIPOPROTEIN: CPT | Mod: 59 | Performed by: NURSE PRACTITIONER

## 2018-08-14 PROCEDURE — 80053 COMPREHEN METABOLIC PANEL: CPT | Performed by: NURSE PRACTITIONER

## 2018-08-14 PROCEDURE — 99396 PREV VISIT EST AGE 40-64: CPT | Performed by: NURSE PRACTITIONER

## 2018-08-14 PROCEDURE — 85025 COMPLETE CBC W/AUTO DIFF WBC: CPT | Performed by: NURSE PRACTITIONER

## 2018-08-14 RX ORDER — ALBUTEROL SULFATE 90 UG/1
2 AEROSOL, METERED RESPIRATORY (INHALATION) EVERY 6 HOURS
Qty: 1 INHALER | Refills: 3 | Status: CANCELLED | OUTPATIENT
Start: 2018-08-14

## 2018-08-14 RX ORDER — HYDROCHLOROTHIAZIDE 12.5 MG/1
12.5 TABLET ORAL DAILY
Qty: 90 TABLET | Refills: 3 | Status: SHIPPED | OUTPATIENT
Start: 2018-08-14 | End: 2020-01-03

## 2018-08-14 RX ORDER — LORATADINE 10 MG/1
10 TABLET ORAL DAILY
COMMUNITY
End: 2021-03-11

## 2018-08-14 RX ORDER — BUDESONIDE 1 MG/2ML
INHALANT ORAL
Refills: 1 | COMMUNITY
Start: 2018-07-21 | End: 2020-01-03

## 2018-08-14 ASSESSMENT — ENCOUNTER SYMPTOMS
MYALGIAS: 0
BREAST MASS: 0
DYSURIA: 0
DIZZINESS: 0
PALPITATIONS: 1
HEARTBURN: 0
DIARRHEA: 0
NERVOUS/ANXIOUS: 1
WEAKNESS: 0
CONSTIPATION: 1
PARESTHESIAS: 1
FREQUENCY: 0
JOINT SWELLING: 1
ABDOMINAL PAIN: 0
HEMATURIA: 0
ARTHRALGIAS: 0
COUGH: 0
HEADACHES: 1
NAUSEA: 0
FEVER: 0
SHORTNESS OF BREATH: 0
CHILLS: 0
EYE PAIN: 0
SORE THROAT: 0
HEMATOCHEZIA: 0

## 2018-08-14 NOTE — PROGRESS NOTES
SUBJECTIVE:   CC: Ophelia Caal is an 57 year old woman who presents for preventive health visit.     Physical   Annual:     Getting at least 3 servings of Calcium per day:  NO    Bi-annual eye exam:  Yes    Dental care twice a year:  Yes    Sleep apnea or symptoms of sleep apnea:  Daytime drowsiness    Diet:  Regular (no restrictions) and Breakfast skipped    Frequency of exercise:  2-3 days/week    Duration of exercise:  30-45 minutes    Taking medications regularly:  Yes    Medication side effects:  Lightheadedness and Other    Additional concerns today:  YES (White, hard lumps on skin, heart palpitations with BP meds due to being on Prednisone and update after surgery/starting alllergy injections.)    Has been on oral steroids intermittently for nasal polyps.  Has also been on steroid sinus rinses daily.  Still has 2 cats but dogs are out of the house.  Gets palpitations shortly after the prednisone dose, lasts a few hours. No associated shortness of breath or palpitations.     Today's PHQ-2 Score:   PHQ-2 ( 1999 [employer]) 8/14/2018   Q1: Little interest or pleasure in doing things 0   Q2: Feeling down, depressed or hopeless 0   PHQ-2 Score 0   Q1: Little interest or pleasure in doing things Not at all   Q2: Feeling down, depressed or hopeless Not at all   PHQ-2 Score 0       Abuse: Current or Past(Physical, Sexual or Emotional)- No  Do you feel safe in your environment - Yes    Social History   Substance Use Topics     Smoking status: Never Smoker     Smokeless tobacco: Never Used     Alcohol use Yes     Alcohol Use 8/14/2018   If you drink alcohol do you typically have greater than 3 drinks per day OR greater than 7 drinks per week? No   No flowsheet data found.    Reviewed orders with patient.  Reviewed health maintenance and updated orders accordingly - yes  Labs reviewed in [facility]    Patient over age 50, mutual decision to screen reflected in health maintenance.    Pertinent mammograms are reviewed under  the imaging tab.  History of abnormal Pap smear: NO - age 30- 65 PAP every 3 years recommended     Reviewed and updated as needed this visit by clinical staff         Reviewed and updated as needed this visit by Provider      Review of Systems   Constitutional: Negative for chills and fever.   HENT: Positive for congestion. Negative for ear pain, hearing loss and sore throat.    Eyes: Negative for pain and visual disturbance.   Respiratory: Negative for cough and shortness of breath.    Cardiovascular: Positive for palpitations. Negative for chest pain and peripheral edema.   Gastrointestinal: Positive for constipation. Negative for abdominal pain, diarrhea, heartburn, hematochezia and nausea.   Breasts:  Negative for tenderness, breast mass and discharge.   Genitourinary: Negative for dysuria, frequency, genital sores, hematuria, pelvic pain, urgency, vaginal bleeding and vaginal discharge.   Musculoskeletal: Positive for joint swelling. Negative for arthralgias and myalgias.   Skin: Negative for rash.   Neurological: Positive for headaches and paresthesias. Negative for dizziness and weakness.   Psychiatric/Behavioral: Positive for mood changes. The patient is nervous/anxious.         OBJECTIVE:   BP (!) 144/96  Pulse 76  Temp 98  F (36.7  C) (Tympanic)  Wt 178 lb 12.8 oz (81.1 kg)  BMI 28 kg/m2  Physical Exam  GENERAL: healthy, alert and no distress  EYES: Eyes grossly normal to inspection, PERRL and conjunctivae and sclerae normal  HENT: ear canals and TM's normal, nose and mouth without ulcers or lesions  NECK: no adenopathy, no asymmetry, masses, or scars and thyroid normal to palpation  RESP: lungs clear to auscultation - no rales, rhonchi or wheezes  BREAST: normal without masses, tenderness or nipple discharge and no palpable axillary masses or adenopathy  CV: regular rate and rhythm, normal S1 S2, no S3 or S4, no murmur, click or rub, no peripheral edema and peripheral pulses strong  ABDOMEN: soft,  "nontender, no hepatosplenomegaly, no masses and bowel sounds normal  MS: no gross musculoskeletal defects noted, no edema  SKIN: no suspicious lesions or rashes  NEURO: Normal strength and tone, mentation intact and speech normal  PSYCH: mentation appears normal, affect normal/bright    Diagnostic Test Results:  none     ASSESSMENT/PLAN:   1. Health care maintenance  - Comprehensive metabolic panel  - Parathyroid Hormone Intact  - Lipid panel reflex to direct LDL Fasting  - TSH with free T4 reflex    2. Benign essential hypertension  Stopped her meds because her BP was fine. Has gained weight since being back on prednisone.   - hydrochlorothiazide 12.5 MG TABS tablet; Take 1 tablet (12.5 mg) by mouth daily  Dispense: 90 tablet; Refill: 3  - Comprehensive metabolic panel  - Albumin Random Urine Quantitative with Creat Ratio  -Re-start BP meds.     3. Moderate persistent asthma without complication  Well controlled per asthma/allergy    4. Serum calcium elevated  Needs recheck. Sister with hyperparathyroidism.   - Comprehensive metabolic panel  - Parathyroid Hormone Intact    5. Palpitations  Gets intermittent palpitations 30 minutes after prednisone dose lasting a few hours. Not associated with chest pain, shortness of breath, dizziness, or shortness of breath. Excellent exercise tolerance.   - Comprehensive metabolic panel  - TSH with free T4 reflex  - CBC with platelets differential   -Discussed with patient. She would like to hold off on any heart monitoring. Will check labs.     COUNSELING:  Reviewed preventive health counseling, as reflected in patient instructions    BP Readings from Last 1 Encounters:   02/20/18 128/78     Estimated body mass index is 28 kg/(m^2) as calculated from the following:    Height as of 2/20/18: 5' 7\" (1.702 m).    Weight as of this encounter: 178 lb 12.8 oz (81.1 kg).      Weight management plan: Discussed healthy diet and exercise guidelines and patient will follow up in 12 months " in clinic to re-evaluate.     reports that she has never smoked. She has never used smokeless tobacco.      Counseling Resources:  ATP IV Guidelines  Pooled Cohorts Equation Calculator  Breast Cancer Risk Calculator  FRAX Risk Assessment  ICSI Preventive Guidelines  Dietary Guidelines for Americans, 2010  USDA's MyPlate  ASA Prophylaxis  Lung CA Screening    Lien Howard, KENNEDY [facility]

## 2018-08-14 NOTE — LETTER
My Asthma Action Plan  Name: Ophelia Caal   YOB: 1960  Date: 8/14/2018   My doctor: KENNEDY Ruby CNP   My clinic: [facility]        My Control Medicine: Mometasone + formoterol (Dulera) -  200/5 mcg 2 puffs once daily  My Rescue Medicine: Albuterol (Proair/Ventolin/Proventil) inhaler 1-2 puffs every 4-6hrs as needed  My Oral Steroid Medicine: Prednisone 20mg My Asthma Severity: moderate persistent  Avoid your asthma triggers:   smoke  strong odors and fumes  exercise or sports            GREEN ZONE   Good Control    I feel good    No cough or wheeze    Can work, sleep and play without asthma symptoms       Take your asthma control medicine every day.     1. If exercise triggers your asthma, take your rescue medication    15 minutes before exercise or sports, and    During exercise if you have asthma symptoms  2. Spacer to use with inhaler: If you have a spacer, make sure to use it with your inhaler             YELLOW ZONE Getting Worse  I have ANY of these:    I do not feel good    Cough or wheeze    Chest feels tight    Wake up at night   1. Keep taking your Green Zone medications  2. Start taking your rescue medicine:    every 20 minutes for up to 1 hour. Then every 4 hours for 24-48 hours.  3. If you stay in the Yellow Zone for more than 12-24 hours, contact your doctor.  4. If you do not return to the Green Zone in 12-24 hours or you get worse, start taking your oral steroid medicine if prescribed by your provider.           RED ZONE Medical Alert - Get Help  I have ANY of these:    I feel awful    Medicine is not helping    Breathing getting harder    Trouble walking or talking    Nose opens wide to breathe       1. Take your rescue medicine NOW  2. If your provider has prescribed an oral steroid medicine, start taking it NOW  3. Call your doctor NOW  4. If you are still in the Red Zone after 20 minutes and you have not reached your doctor:    Take your rescue  medicine again and    Call 911 or go to the emergency room right away    See your regular doctor within 2 weeks of an Emergency Room or Urgent Care visit for follow-up treatment.          Annual Reminders:  Meet with Asthma Educator,  Flu Shot in the Fall, consider Pneumonia Vaccination for patients with asthma (aged 19 and older).    Pharmacy: [facility] [place] - [address]                      Asthma Triggers  How To Control Things That Make Your Asthma Worse    Triggers are things that make your asthma worse.  Look at the list below to help you find your triggers and what you can do about them.  You can help prevent asthma flare-ups by staying away from your triggers.      Trigger                                                          What you can do   Cigarette Smoke  Tobacco smoke can make asthma worse. Do not allow smoking in your home, car or around you.  Be sure no one smokes at a child s day care or school.  If you smoke, ask your health care provider for ways to help you quit.  Ask family members to quit too.  Ask your health care provider for a referral to Quit Plan to help you quit smoking, or call 0-113-283-PLAN.     Colds, Flu, Bronchitis  These are common triggers of asthma. Wash your hands often.  Don t touch your eyes, nose or mouth.  Get a flu shot every year.     Dust Mites  These are tiny bugs that live in cloth or carpet. They are too small to see. Wash sheets and blankets in hot water every week.   Encase pillows and mattress in dust mite proof covers.  Avoid having carpet if you can. If you have carpet, vacuum weekly.   Use a dust mask and HEPA vacuum.   Pollen and Outdoor Mold  Some people are allergic to trees, grass, or weed pollen, or molds. Try to keep your windows closed.  Limit time out doors when pollen count is high.   Ask you health care provider about taking medicine during allergy season.     Animal Dander  Some people are allergic to skin flakes,  urine or saliva from pets with fur or feathers. Keep pets with fur or feathers out of your home.    If you can t keep the pet outdoors, then keep the pet out of your bedroom.  Keep the bedroom door closed.  Keep pets off cloth furniture and away from stuffed toys.     Mice, Rats, and Cockroaches  Some people are allergic to the waste from these pests.   Cover food and garbage.  Clean up spills and food crumbs.  Store grease in the refrigerator.   Keep food out of the bedroom.   Indoor Mold  This can be a trigger if your home has high moisture. Fix leaking faucets, pipes, or other sources of water.   Clean moldy surfaces.  Dehumidify basement if it is damp and smelly.   Smoke, Strong Odors, and Sprays  These can reduce air quality. Stay away from strong odors and sprays, such as perfume, powder, hair spray, paints, smoke incense, paint, cleaning products, candles and new carpet.   Exercise or Sports  Some people with asthma have this trigger. Be active!  Ask your doctor about taking medicine before sports or exercise to prevent symptoms.    Warm up for 5-10 minutes before and after sports or exercise.     Other Triggers of Asthma  Cold air:  Cover your nose and mouth with a scarf.  Sometimes laughing or crying can be a trigger.  Some medicines and food can trigger asthma.

## 2018-08-14 NOTE — MR AVS SNAPSHOT
After Visit Summary   8/14/2018    Ophelia Caal    MRN: [id number]           Patient Information     Date Of Birth          1960        Visit Information        Provider Department      8/14/2018 7:20 AM Lien Howard APRN [facility]        Today's Diagnoses     Health care maintenance    -  1    Benign essential hypertension        Moderate persistent asthma without complication        Serum calcium elevated        Palpitations          Care Instructions    1. See derm   2. Re-start BP meds        Preventive Health Recommendations  Female Ages 50 - 64    Yearly exam: See your health care provider every year in order to  o Review health changes.   o Discuss preventive care.    o Review your medicines if your doctor has prescribed any.      Get a Pap test every three years (unless you have an abnormal result and your provider advises testing more often).    If you get Pap tests with HPV test, you only need to test every 5 years, unless you have an abnormal result.     You do not need a Pap test if your uterus was removed (hysterectomy) and you have not had cancer.    You should be tested each year for STDs (sexually transmitted diseases) if you're at risk.     Have a mammogram every 1 to 2 years.    Have a colonoscopy at age 50, or have a yearly FIT test (stool test). These exams screen for colon cancer.      Have a cholesterol test every 5 years, or more often if advised.    Have a diabetes test (fasting glucose) every three years. If you are at risk for diabetes, you should have this test more often.     If you are at risk for osteoporosis (brittle bone disease), think about having a bone density scan (DEXA).    Shots: Get a flu shot each year. Get a tetanus shot every 10 years.    Nutrition:     Eat at least 5 servings of fruits and vegetables each day.    Eat whole-grain bread, whole-wheat pasta and brown rice instead of white grains and rice.    Get adequate  "Calcium and Vitamin D.     Lifestyle    Exercise at least 150 minutes a week (30 minutes a day, 5 days a week). This will help you control your weight and prevent disease.    Limit alcohol to one drink per day.    No smoking.     Wear sunscreen to prevent skin cancer.     See your dentist every six months for an exam and cleaning.    See your eye doctor every 1 to 2 years.            Follow-ups after your visit        Who to contact     If you have questions or need follow up information about today's clinic visit or your schedule please contact [facility] directly at [phone].  Normal or non-critical lab and imaging results will be communicated to you by [employer], letter or phone within 4 business days after the clinic has received the results. If you do not hear from us within 7 days, please contact the clinic through [employer] or phone. If you have a critical or abnormal lab result, we will notify you by phone as soon as possible.  Submit refill requests through [employer] or call your pharmacy and they will forward the refill request to us. Please allow 3 business days for your refill to be completed.          Additional Information About Your Visit        MyChart Information     NoFlo lets you send messages to your doctor, view your test results, renew your prescriptions, schedule appointments and more. To sign up, go to [email or web address] . Click on \"Log in\" on the left side of the screen, which will take you to the Welcome page. Then click on \"Sign up Now\" on the right side of the page.     You will be asked to enter the access code listed below, as well as some personal information. Please follow the directions to create your username and password.     Your access code is: [email or web address]  Expires: 2018  7:51 AM     Your access code will  in 90 days. If you need help or a new code, please call your [facility] clinic or [phone].        Care EveryWhere ID     This is your Care " EveryWhere ID. This could be used by other organizations to access your [place] medical records  [id number]        Your Vitals Were     Pulse Temperature BMI (Body Mass Index)             76 98  F (36.7  C) (Tympanic) 28 kg/m2          Blood Pressure from Last 3 Encounters:   08/14/18 (!) 144/96   02/20/18 128/78   01/09/18 142/84    Weight from Last 3 Encounters:   08/14/18 178 lb 12.8 oz (81.1 kg)   02/20/18 170 lb 4.8 oz (77.2 kg)   01/09/18 177 lb 8 oz (80.5 kg)              We Performed the Following     Albumin Random Urine Quantitative with Creat Ratio     CBC with platelets differential     Comprehensive metabolic panel     Lipid panel reflex to direct LDL Fasting     Parathyroid Hormone Intact     TSH with free T4 reflex          Where to get your medicines      These medications were sent to [id number] - [place] - [address]  [address] [address]     Phone:  [phone]     hydrochlorothiazide 12.5 MG Tabs tablet          Primary Care Provider Office Phone # Fax #    Lien Howard, APRN [facility] [phone] [address] [place] [address]        Equal Access to Services     MUSA MATIAS AH: Hadii brandon rangel hadasho Soomaali, waaxda luqadaha, qaybta kaalmada adeegyada, leighann soto. So [facility] [phone].    ATENCIÓN: Si habla español, tiene a arevalo disposición servicios gratuitos de asistencia lingüística. Llame al [phone].    We comply with applicable federal civil rights laws and [place] laws. We do not discriminate on the basis of race, color, national origin, age, disability, sex, sexual orientation, or gender identity.            Thank you!     Thank you for choosing [facility]  for your care. Our goal is always to provide you with excellent care. Hearing back from our patients is one way we can continue to improve our services. Please take a few minutes to complete the  written survey that you may receive in the mail after your visit with us. Thank you!             Your Updated Medication List - Protect others around you: Learn how to safely use, store and throw away your medicines at [email or web address].          This list is accurate as of 8/14/18  7:51 AM.  Always use your most recent med list.                   Brand Name Dispense Instructions for use Diagnosis    albuterol 108 (90 Base) MCG/ACT inhaler    PROAIR HFA/PROVENTIL HFA/VENTOLIN HFA    1 Inhaler    Inhale 2 puffs into the lungs every 6 hours    Moderate persistent asthma without complication       ALLERGY INJECTIONS      twice a week        budesonide 1 MG/2ML Susp neb solution    PULMICORT          fluticasone 50 MCG/ACT spray    FLONASE     Spray 1 spray into both nostrils daily    Moderate persistent asthma without complication       hydrochlorothiazide 12.5 MG Tabs tablet     90 tablet    Take 1 tablet (12.5 mg) by mouth daily    Benign essential hypertension       hydrocortisone 2.5 % ointment     30 g    Twice daily as needed to rash by mouth    Dermatitis       loratadine 10 MG tablet    CLARITIN     Take 10 mg by mouth daily        mometasone 0.1 % ointment    ELOCON    90 g    Twice daily to hand rash until clear, then as needed.    Dermatitis       mometasone-formoterol 200-5 MCG/ACT oral inhaler    DULERA    2 Inhaler    Inhale 2 puffs into the lungs 2 times daily    Moderate persistent asthma without complication       PREDNISONE PO      Take 20 mg by mouth    Dermatitis       VITAMIN D (CHOLECALCIFEROL) PO      Take by mouth daily

## 2018-08-14 NOTE — PATIENT INSTRUCTIONS
1. See derm   2. Re-start BP meds        Preventive Health Recommendations  Female Ages 50 - 64    Yearly exam: See your health care provider every year in order to  o Review health changes.   o Discuss preventive care.    o Review your medicines if your doctor has prescribed any.      Get a Pap test every three years (unless you have an abnormal result and your provider advises testing more often).    If you get Pap tests with HPV test, you only need to test every 5 years, unless you have an abnormal result.     You do not need a Pap test if your uterus was removed (hysterectomy) and you have not had cancer.    You should be tested each year for STDs (sexually transmitted diseases) if you're at risk.     Have a mammogram every 1 to 2 years.    Have a colonoscopy at age 50, or have a yearly FIT test (stool test). These exams screen for colon cancer.      Have a cholesterol test every 5 years, or more often if advised.    Have a diabetes test (fasting glucose) every three years. If you are at risk for diabetes, you should have this test more often.     If you are at risk for osteoporosis (brittle bone disease), think about having a bone density scan (DEXA).    Shots: Get a flu shot each year. Get a tetanus shot every 10 years.    Nutrition:     Eat at least 5 servings of fruits and vegetables each day.    Eat whole-grain bread, whole-wheat pasta and brown rice instead of white grains and rice.    Get adequate Calcium and Vitamin D.     Lifestyle    Exercise at least 150 minutes a week (30 minutes a day, 5 days a week). This will help you control your weight and prevent disease.    Limit alcohol to one drink per day.    No smoking.     Wear sunscreen to prevent skin cancer.     See your dentist every six months for an exam and cleaning.    See your eye doctor every 1 to 2 years.

## 2018-08-15 ASSESSMENT — ASTHMA QUESTIONNAIRES: ACT_TOTALSCORE: 25

## 2018-08-17 LAB — LDLC SERPL DIRECT ASSAY-MCNC: 204 MG/DL

## 2018-09-25 ENCOUNTER — TRANSFERRED RECORDS (OUTPATIENT)
Dept: HEALTH INFORMATION MANAGEMENT | Facility: CLINIC | Age: 58
End: 2018-09-25

## 2018-10-02 DIAGNOSIS — Z13.21 ENCOUNTER FOR VITAMIN DEFICIENCY SCREENING: ICD-10-CM

## 2018-10-02 DIAGNOSIS — R94.6 ABNORMAL FINDING ON THYROID FUNCTION TEST: ICD-10-CM

## 2018-10-02 DIAGNOSIS — E83.52 SERUM CALCIUM ELEVATED: ICD-10-CM

## 2018-10-02 LAB
ALBUMIN SERPL-MCNC: 4.2 G/DL (ref 3.4–5)
ALP SERPL-CCNC: 70 U/L (ref 40–150)
ALT SERPL W P-5'-P-CCNC: 28 U/L (ref 0–50)
ANION GAP SERPL CALCULATED.3IONS-SCNC: 9 MMOL/L (ref 3–14)
AST SERPL W P-5'-P-CCNC: 18 U/L (ref 0–45)
BILIRUB SERPL-MCNC: 0.7 MG/DL (ref 0.2–1.3)
BUN SERPL-MCNC: 14 MG/DL (ref 7–30)
CALCIUM SERPL-MCNC: 10.1 MG/DL (ref 8.5–10.1)
CHLORIDE SERPL-SCNC: 106 MMOL/L (ref 94–109)
CHOLEST SERPL-MCNC: 319 MG/DL
CO2 SERPL-SCNC: 26 MMOL/L (ref 20–32)
CREAT SERPL-MCNC: 0.81 MG/DL (ref 0.52–1.04)
DEPRECATED CALCIDIOL+CALCIFEROL SERPL-MC: 33 UG/L (ref 20–75)
GFR SERPL CREATININE-BSD FRML MDRD: 73 ML/MIN/1.7M2
GLUCOSE SERPL-MCNC: 102 MG/DL (ref 70–99)
HDLC SERPL-MCNC: 59 MG/DL
LDLC SERPL CALC-MCNC: 215 MG/DL
NONHDLC SERPL-MCNC: 260 MG/DL
POTASSIUM SERPL-SCNC: 4 MMOL/L (ref 3.4–5.3)
PROT SERPL-MCNC: 8.4 G/DL (ref 6.8–8.8)
PTH-INTACT SERPL-MCNC: 95 PG/ML (ref 18–80)
SODIUM SERPL-SCNC: 141 MMOL/L (ref 133–144)
TRIGL SERPL-MCNC: 225 MG/DL
TSH SERPL DL<=0.005 MIU/L-ACNC: 2.6 MU/L (ref 0.4–4)

## 2018-10-02 PROCEDURE — 84443 ASSAY THYROID STIM HORMONE: CPT | Performed by: NURSE PRACTITIONER

## 2018-10-02 PROCEDURE — 80053 COMPREHEN METABOLIC PANEL: CPT | Performed by: NURSE PRACTITIONER

## 2018-10-02 PROCEDURE — 83970 ASSAY OF PARATHORMONE: CPT | Performed by: NURSE PRACTITIONER

## 2018-10-02 PROCEDURE — 86376 MICROSOMAL ANTIBODY EACH: CPT | Performed by: NURSE PRACTITIONER

## 2018-10-02 PROCEDURE — 82306 VITAMIN D 25 HYDROXY: CPT | Performed by: NURSE PRACTITIONER

## 2018-10-02 PROCEDURE — 36415 COLL VENOUS BLD VENIPUNCTURE: CPT | Performed by: NURSE PRACTITIONER

## 2018-10-02 PROCEDURE — 80061 LIPID PANEL: CPT | Performed by: NURSE PRACTITIONER

## 2018-10-03 LAB — THYROPEROXIDASE AB SERPL-ACNC: <10 IU/ML

## 2018-10-08 ENCOUNTER — TRANSFERRED RECORDS (OUTPATIENT)
Dept: HEALTH INFORMATION MANAGEMENT | Facility: CLINIC | Age: 58
End: 2018-10-08

## 2018-10-11 ENCOUNTER — OFFICE VISIT (OUTPATIENT)
Dept: PEDIATRICS | Facility: CLINIC | Age: 58
End: 2018-10-11
Payer: COMMERCIAL

## 2018-10-11 VITALS
SYSTOLIC BLOOD PRESSURE: 126 MMHG | HEART RATE: 72 BPM | DIASTOLIC BLOOD PRESSURE: 74 MMHG | HEIGHT: 67 IN | BODY MASS INDEX: 27.29 KG/M2 | RESPIRATION RATE: 14 BRPM | WEIGHT: 173.9 LBS | OXYGEN SATURATION: 99 % | TEMPERATURE: 97.1 F

## 2018-10-11 DIAGNOSIS — E78.2 MIXED HYPERLIPIDEMIA: Primary | ICD-10-CM

## 2018-10-11 PROCEDURE — 99213 OFFICE O/P EST LOW 20 MIN: CPT | Performed by: NURSE PRACTITIONER

## 2018-10-11 RX ORDER — SIMVASTATIN 10 MG
10 TABLET ORAL AT BEDTIME
Qty: 90 TABLET | Refills: 3 | Status: SHIPPED | OUTPATIENT
Start: 2018-10-11 | End: 2020-01-28

## 2018-10-11 RX ORDER — BUDESONIDE 1 MG/2ML
INHALANT ORAL
Refills: 1 | Status: CANCELLED | OUTPATIENT
Start: 2018-10-11

## 2018-10-11 RX ORDER — ALBUTEROL SULFATE 90 UG/1
2 AEROSOL, METERED RESPIRATORY (INHALATION) EVERY 6 HOURS
Qty: 1 INHALER | Refills: 3 | Status: CANCELLED | OUTPATIENT
Start: 2018-10-11

## 2018-10-11 RX ORDER — MOMETASONE FUROATE 1 MG/G
OINTMENT TOPICAL
Qty: 90 G | Refills: 2 | Status: CANCELLED | OUTPATIENT
Start: 2018-10-11

## 2018-10-11 RX ORDER — HYDROCORTISONE 25 MG/G
OINTMENT TOPICAL
Qty: 30 G | Refills: 1 | Status: CANCELLED | OUTPATIENT
Start: 2018-10-11

## 2018-10-11 NOTE — PROGRESS NOTES
"  SUBJECTIVE:   Opehlia Caal is a 58 year old female who presents to clinic today for the following health issues:    Patient here today for follow up of lab results.    States her cholesterol results were roughly the same before and after being on a low carb diet. Her entire family has high cholesterol and is on statins but no FMH heart attack or stroke. She states her acupuncturist told her about bad side effects of statins on the liver.     ROS: const/gi/endo otherwise negative     OBJECTIVE:  /74 (BP Location: Right arm, Cuff Size: Adult Large)  Pulse 72  Temp 97.1  F (36.2  C) (Tympanic)  Resp 14  Ht 5' 7\" (1.702 m)  Wt 173 lb 14.4 oz (78.9 kg)  SpO2 99%  BMI 27.24 kg/m2  CONSTITUTIONAL: Alert, well-nourished, well-groomed, NAD  RESP: Lungs CTA. No wheeze, rhonchi, rales.  CV: HRRR S1 S2 No MRG. No peripheral edema      ASSESSMENT/PLAN:  (E78.2) Mixed hyperlipidemia  (primary encounter diagnosis)  Comment: LDL >190 with strong FMH hyperlipidemia. No FMH stroke or MI. Recommended statin.   Plan: simvastatin (ZOCOR) 10 MG tablet,         **Comprehensive metabolic panel FUTURE 1yr,         Hepatic panel (Albumin, ALT, AST, Bili, Alk         Phos, TP)          -Patient is very nervous about starting a statin so we will start with a very low dose  -She wishes to recheck cholesterol and liver fx in 3 months  -Discussed r/b/se of medication.       Lien Howard, MONYP-DNP.        "

## 2018-10-11 NOTE — MR AVS SNAPSHOT
After Visit Summary   10/11/2018    Ophelia Caal    MRN: [id number]           Patient Information     Date Of Birth          1960        Visit Information        Provider Department      10/11/2018 10:00 AM Lien Howard APRN CNP [facility]        Today's Diagnoses     Mixed hyperlipidemia    -  1      Care Instructions    Please start Simvastatin at night every night.    Recheck liver/cholesteorl in 3 months          Follow-ups after your visit        Your next 10 appointments already scheduled     Oct 22, 2018 10:00 AM CDT   New Visit with Susan Sandhu MD   [facility] ([facility]    [address]  [address]  [place] [address]   [phone]            Nov 06, 2018  2:15 PM CST   Return Visit with Corrine Howard MD   [facility] ([facility])    [address]  [address]  [place] [address]   [phone]              Future tests that were ordered for you today     Open Future Orders        Priority Expected Expires Ordered    **Comprehensive metabolic panel FUTURE 1yr Routine 9/11/2019 10/11/2019 10/11/2018    Hepatic panel (Albumin, ALT, AST, Bili, Alk Phos, TP) Routine  10/11/2019 10/11/2018            Who to contact     If you have questions or need follow up information about today's clinic visit or your schedule please contact [facility] directly at [phone].  Normal or non-critical lab and imaging results will be communicated to you by MyChart, letter or phone within 4 business days after the clinic has received the results. If you do not hear from us within 7 days, please contact the clinic through MyChart or phone. If you have a critical or abnormal lab result, we will notify you by phone as soon as possible.  Submit refill requests through [employer] or call your pharmacy and they will forward the refill request to us. Please allow 3 business days for  "your refill to be completed.          Additional Information About Your Visit        Enflickhart Information     [employer] gives you secure access to your electronic health record. If you see a primary care provider, you can also send messages to your care team and make appointments. If you have questions, please call your primary care clinic.  If you do not have a primary care provider, please call [phone] and they will assist you.        Care EveryWhere ID     This is your Care EveryWhere ID. This could be used by other organizations to access your [place] medical records  [id number]        Your Vitals Were     Pulse Temperature Respirations Height Pulse Oximetry BMI (Body Mass Index)    72 97.1  F (36.2  C) (Tympanic) 14 5' 7\" (1.702 m) 99% 27.24 kg/m2       Blood Pressure from Last 3 Encounters:   10/11/18 126/74   08/14/18 (!) 144/96   02/20/18 128/78    Weight from Last 3 Encounters:   10/11/18 173 lb 14.4 oz (78.9 kg)   08/14/18 178 lb 12.8 oz (81.1 kg)   02/20/18 170 lb 4.8 oz (77.2 kg)                 Today's Medication Changes          These changes are accurate as of 10/11/18 10:35 AM.  If you have any questions, ask your nurse or doctor.               Start taking these medicines.        Dose/Directions    simvastatin 10 MG tablet   Commonly known as:  ZOCOR   Used for:  Mixed hyperlipidemia   Started by:  Lien Howard APRN CNP        Dose:  10 mg   Take 1 tablet (10 mg) by mouth At Bedtime   Quantity:  90 tablet   Refills:  3            Where to get your medicines      These medications were sent to [facility]  [place] - [address]  [address] [address]     Phone:  [phone]     simvastatin 10 MG tablet                Primary Care Provider Office Phone # Fax #    KENNEDY Ruby [phone] [address] [place] [address]        Equal Access to Services     [facility] POLLY AH: Devonii brandon rangel " alexandra Lombardi, wagiselleda luqadaha, qaybta kaalmada german, leighann hickman lamaynorcathy charles. So [facility] [phone].    ATENCIÓN: Si habla yoselin, tiene a arevalo disposición servicios gratuitos de asistencia lingüística. Robbie al [phone].    We comply with applicable federal civil rights laws and [place] laws. We do not discriminate on the basis of race, color, national origin, age, disability, sex, sexual orientation, or gender identity.            Thank you!     Thank you for choosing [facility] [place]  for your care. Our goal is always to provide you with excellent care. Hearing back from our patients is one way we can continue to improve our services. Please take a few minutes to complete the written survey that you may receive in the mail after your visit with us. Thank you!             Your Updated Medication List - Protect others around you: Learn how to safely use, store and throw away your medicines at www.disposemymeds.org.          This list is accurate as of 10/11/18 10:35 AM.  Always use your most recent med list.                   Brand Name Dispense Instructions for use Diagnosis    albuterol 108 (90 Base) MCG/ACT inhaler    PROAIR HFA/PROVENTIL HFA/VENTOLIN HFA    1 Inhaler    Inhale 2 puffs into the lungs every 6 hours    Moderate persistent asthma without complication       ALLERGY INJECTIONS      twice a week        budesonide 1 MG/2ML Susp neb solution    PULMICORT          FISH OIL PO           fluticasone 50 MCG/ACT spray    FLONASE     Spray 1 spray into both nostrils daily    Moderate persistent asthma without complication       hydrochlorothiazide 12.5 MG Tabs tablet     90 tablet    Take 1 tablet (12.5 mg) by mouth daily    Benign essential hypertension       hydrocortisone 2.5 % ointment     30 g    Twice daily as needed to rash by mouth    Dermatitis       loratadine 10 MG tablet    CLARITIN     Take 10 mg by mouth daily        mometasone 0.1 % ointment    ELOCON    90  g    Twice daily to hand rash until clear, then as needed.    Dermatitis       mometasone-formoterol 200-5 MCG/ACT oral inhaler    DULERA    2 Inhaler    Inhale 2 puffs into the lungs 2 times daily    Moderate persistent asthma without complication       MULTIVITAMIN PO           simvastatin 10 MG tablet    ZOCOR    90 tablet    Take 1 tablet (10 mg) by mouth At Bedtime    Mixed hyperlipidemia       VITAMIN D (CHOLECALCIFEROL) PO      Take by mouth daily

## 2018-10-19 PROBLEM — E78.2 MIXED HYPERLIPIDEMIA: Status: ACTIVE | Noted: 2018-10-11

## 2018-10-19 PROBLEM — E78.2 MIXED HYPERLIPIDEMIA: Status: ACTIVE | Noted: 2018-10-19

## 2018-10-22 ENCOUNTER — OFFICE VISIT (OUTPATIENT)
Dept: ENDOCRINOLOGY | Facility: CLINIC | Age: 58
End: 2018-10-22
Attending: NURSE PRACTITIONER
Payer: COMMERCIAL

## 2018-10-22 VITALS
HEIGHT: 67 IN | OXYGEN SATURATION: 99 % | SYSTOLIC BLOOD PRESSURE: 140 MMHG | HEART RATE: 73 BPM | BODY MASS INDEX: 27.42 KG/M2 | TEMPERATURE: 97.6 F | WEIGHT: 174.7 LBS | DIASTOLIC BLOOD PRESSURE: 92 MMHG

## 2018-10-22 DIAGNOSIS — E21.3 HYPERPARATHYROIDISM (H): ICD-10-CM

## 2018-10-22 PROCEDURE — 99243 OFF/OP CNSLTJ NEW/EST LOW 30: CPT | Performed by: INTERNAL MEDICINE

## 2018-10-22 NOTE — NURSING NOTE
"ENDOCRINOLOGY INTAKE FORM    Patient Name:  Ophelia Caal  :  1960    Is patient Diabetic?   No  Does patient have non-diabetic or other endocrine issues?  Yes: elevated parthyroid hormone    Vitals: BP (!) 140/92 (BP Location: Right arm, Patient Position: Chair, Cuff Size: Adult Large)  Pulse 73  Temp 97.6  F (36.4  C) (Oral)  Ht 1.702 m (5' 7\")  Wt 79.2 kg (174 lb 11.2 oz)  SpO2 99%  Breastfeeding? No  BMI 27.36 kg/m2  BMI= Body mass index is 27.36 kg/(m^2).    Flu vaccine:  Yes: 10/08/18  Pneumonia vaccine:  No    Smoking and Alcohol use:  Social History   Substance Use Topics     Smoking status: Never Smoker     Smokeless tobacco: Never Used     Alcohol use Yes       Laura Case CMA    "

## 2018-10-22 NOTE — PATIENT INSTRUCTIONS
[facility]   Dr Sandhu, Endocrinology Department      [facility]   [address]  [place] [address]  Appointment [phone]  Fax: [phone]  [place]: Monday and Tuesday         [place] [address]  [place] [address]  Appointment [phone]  Fax: [phone]  [place]: Wednesday and Thursday            Increase vit D by 1000 international units /day   labs in 4-6 months  Please make a lab appointment for blood work and follow up clinic appointment in 1 week after that to discuss results.

## 2018-10-22 NOTE — PROGRESS NOTES
Name: Ophelia Caal  Seen in consultation with Lien Howard for hyperPTH.  HPI:  Ophelia Caal is a 58 year old female who presents for the evaluation of hyperPTH.  Sister with hypercalcemia and hyperparathyroidism.  H/o hyperthyroidism and was on medication for many years. Not on it at this time. Off medication X 35 years.  Recent TSH normal.    Calcium was noted to be slightly high 10.3 2018 ( was started on hydrochlorothiazide around that time)  Repeat labs 2018 normal Calcium 9.6 and high .  Repeat PTH 95 (10/2018)    Feeling OK  No major complaints.  No other FH of hyperpara/hyper Ca apart from her sister as noted above.  No h/o MEN syndrome in family.    History of Cancer:No  Thiazide Diuretic:Yes (Please explain): yes. hydrochlorothiazide X 1 year  Lithium use:No  Family History of pituitary adenoma, pancreas tumors, Zollinger-Carrera syndrome, pheochromocytoma. No  Kidney stones:No  Fractures: No  Abdominal Pain:Yes (Please explain): yes- problems with bowel movement. On and off abdo pain.  Cramps: No  Constipation: No  Muscle Aches or pains: No  Muscle twitches: No  Confusion Lethargy and fatigue: ok  ON medications like Lithium/ HCTZ: no  Average Daily Calcium intake: eats yogurt everyday.  Ca and Vit D supplementation: takes calcium and vit D 5000 international units /day.    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     Social History Main Topics     Smoking status: Never Smoker     Smokeless tobacco: Never Used     Alcohol use Yes     Drug use: No     Sexual activity: Yes     Partners: Male      Comment: hysterectomy     Other Topics Concern     Not on file     Social History Narrative          MEDICATIONS:  has a current medication list which includes the following prescription(s): albuterol, ALLERGY INJECTIONS, budesonide, fluticasone, hydrochlorothiazide, hydrocortisone, loratadine, mometasone, multiple vitamins-minerals, omega-3 fatty acids, simvastatin, and cholecalciferol.    ROS     ROS: 10 point ROS neg other than the symptoms noted above in the HPI.    Physical Exam   VS: BP (!) 140/92 (BP Location: Right arm, Patient Position: Chair, Cuff Size: Adult Large)  Pulse 73  Temp 97.6  F (36.4  C) (Oral)  Ht 1.702 m (5' 7\")  Wt 79.2 kg (174 lb 11.2 oz)  SpO2 99%  Breastfeeding? No  BMI 27.36 kg/m2  GENERAL: AXOX3, NAD, well dressed, answering questions appropriately, appears stated age.  HEENT: OP clear, no LAD, no TM, non-tender, no exopthalmous, no proptosis, EOMI, no lig lag, no retraction  NECK: Thyroid normal in size, non tender, no nodules were palpated  CV: RRR, no rubs, gallops, no murmurs  LUNGS: CTAB, no wheezes, rales, or ronchi  ABDOMEN: +BS  EXTREMITIES: no edema, +pulses, no rashes, no lesions  NEUROLOGY: CN grossly intact, + DTR upper and lower extremity, no tremors  MSK: grossly intact  SKIN: no rashes, no lesions  PSYCH: normal affect and mood    LABS:  Calcium:  ENDO CALCIUM LABS-UMP Latest Ref Rng & Units 10/2/2018 8/14/2018   CALCIUM 8.5 - 10.1 mg/dL 10.1 9.6     ENDO CALCIUM LABS-UMP Latest Ref Rng & Units 2/20/2018 1/9/2018   CALCIUM 8.5 - 10.1 mg/dL 10.3 (H) 9.7     ENDO CALCIUM LABS-UMP Latest Ref Rng & Units 4/28/2017   CALCIUM 8.5 - 10.1 mg/dL 10.1 "     PTH:  ENDO CALCIUM LABS-UMP Latest Ref Rng & Units 10/2/2018 8/14/2018   PARATHYROID HORMONE INTACT 18 - 80 pg/mL 95 (H) 128 (H)     Vitamin D:  Lab Results   Component Value Date    VITDT 33 10/02/2018       TFTs:  Lab Results   Component Value Date    TSH 2.60 10/02/2018       All pertinent notes, labs, and images personally reviewed by me.     A/P  Ms.Teresa SADAF Caal is a 58 year old here for the evaluation of:    1. Hyperparathyroidism:  In the differential diagnosis of hypercalcemia, primary hyperparathyroidism is the most common cause. It is important to distinguish readily between primary hyperparathyroidism and other causes of hypercalcemia.   Recent Ca normal  PTH improving.  On hydrochlorothiazide  Vit D low normal  Asymptomatic  No h/o fractures or osteoporosis  Normal kidney function.  Plan:  Discussed diagnosis, pathophysiology, management and treatment options of condition with pt.  In the setting of normal calcium, asymptomatic nature and improving PTH plan to continue to monitor.  Increase vit D by 1000 international units / day  Recheck labs in 4-6 months  Please make a lab appointment for blood work and follow up clinic appointment in 1 week after that to discuss results.      HEREDITARY HYPERPARATHYROID STATES  Multiple Endocrine Neoplasia (MEN), both type 1 and type II.  Primary hyperparathyroidism is often the first and is the most common of the endocrinopathies in MEN1, reaching nearly 100% penetrance by the age of 50. In MEN I, the other tumors, besides the parathyroids, that can develop are those of pancreas and anterior pituitary glands. Involvement of two of the three glands confirms the presence of MEN I. MEN IIa, in which hyperparathyroidism can be associated with medullary thyroid cancer.    Hyperparathyroidism jaw tumor syndrome (AD), is associated with PHPT and fibromas in the mandible or the maxilla. Unlike FHH or the MEN I and II, parathyroid carcinoma is more common in  hyperparathyroidism jaw tumor syndrome.     Familial hypocalciuric hypercalcemia (FHH). This presentation that can be confused with the most common form of primary hyperparathyroidism, namely the sporadic isolated disorder. FHH is also known as Familial Benign Hypercalcemia because it is generally asymptomatic and does not require treatment.  Lab work to differentiate FHH from primary PTH Hypercalcemia, Hypocalciuria, Normal to high levels of PTH, Hypermagnesemia, Calcium/creatinine clearance ratio <0.01, and Genetic testing for mutations in CASR.     The serum calcium determination is typically not greater than 1 mg/dl above the upper limits of normal. The serum phosphorus is in the lower range of normal with only approximately 25% of patients showing phosphorus levels that are frankly low. Total alkaline phosphatase activity is in the high normal range as is the case also for more specific markers of bone turnover and, bone-specific alkaline phosphatase activity. If the normal concentration of 25-hydroxyvitamin D level is taken to be >30 ng/ml, then most patients with primary hyperparathyroidism will have low levels. In contrast, the 1,25-dihydroxyvitamin D level tends to be in the upper range of normal and, in fact, frankly elevated in 25% of patients with primary hyperparathyroidism.    Guidelines for parathyroid surgery in asymptomatic primary hyperparathyroidism:    Serum Calcium >1.0 mg/dl (0.25 mmol/L) above normal   Creatinine Clearance (calculated) Below 60m1/min /1.73 m2)   Bone Mineral Density T score < -2.5 SD at spine, hip (total or femoral neck) or radius (distal 1/3 site) or presence of fragility fracture   Age Age < 50 years        PARATHYROID GLAND IMAGING  Pre- operative imaging is of value prior to surgery in the localization of abnormal parathyroid tissue . The diagnosis of PHPT is based on the biochemical findings and is not affected by the results of the imaging studies. Sestamibi imaging is  of value in localizing abnormal parathyroid tissue. The sensitivity and specificity varies among institutions.    More than 50% of the time spent with Ms. Caal on counseling / coordinating her care.     Follow-up:  4-6 months.    Susan Sandhu MD  Endocrinology   [facility]  October 22, 2018  CC: Lien Howard

## 2018-10-22 NOTE — MR AVS SNAPSHOT
After Visit Summary   10/22/2018    Ophelia Caal    MRN: [id number]           Patient Information     Date Of Birth          1960        Visit Information        Provider Department      10/22/2018 10:00 AM Susan Sandhu MD [facility]        Today's Diagnoses     Hyperparathyroidism (H)          Care Instructions    [facility]   Dr Sandhu, Endocrinology Department      [facility]   [address]  [place] [address]  Appointment [phone]  Fax: [phone]  [place]: Monday and Tuesday         [place] [address] [facility] [address]  [place] [address]  Appointment [phone]  Fax: [phone]  [place]: Wednesday and Thursday            Increase vit D by 1000 international units /day   labs in 4-6 months  Please make a lab appointment for blood work and follow up clinic appointment in 1 week after that to discuss results.              Follow-ups after your visit        Your next 10 appointments already scheduled     2018  2:15 PM CST   Return Visit with Corrine Howard MD   [facility] [place] ([facility])    [address] [place] [address]  [address]  [facility] [id number] [phone]              Future tests that were ordered for you today     Open Future Orders        Priority Expected Expires Ordered    Magnesium Routine  2019 10/22/2018    Parathyroid Hormone Intact Routine  2019 10/22/2018    Phosphorus Routine  2019 10/22/2018    Vitamin D Deficiency Routine  2019 10/22/2018    Calcium Routine  2019 10/22/2018    Calcium ionized Routine  2019 10/22/2018    Creatinine Routine  2019 10/22/2018            Who to contact     If you have questions or need follow up information about today's clinic visit or your schedule please contact [facility] [place] directly at  "[phone].  Normal or non-critical lab and imaging results will be communicated to you by apstratahart, letter or phone within 4 business days after the clinic has received the results. If you do not hear from us within 7 days, please contact the clinic through [employer] or phone. If you have a critical or abnormal lab result, we will notify you by phone as soon as possible.  Submit refill requests through [employer] ([employer]) or call your pharmacy and they will forward the refill request to us. Please allow 3 business days for your refill to be completed.          Additional Information About Your Visit        apstratahart Information     [employer] ([employer]) gives you secure access to your electronic health record. If you see a primary care provider, you can also send messages to your care team and make appointments. If you have questions, please call your primary care clinic.  If you do not have a primary care provider, please call [phone] and they will assist you.        Care EveryWhere ID     This is your Care EveryWhere ID. This could be used by other organizations to access your [place] medical records  [id number]        Your Vitals Were     Pulse Temperature Height Pulse Oximetry Breastfeeding? BMI (Body Mass Index)    73 97.6  F (36.4  C) (Oral) 1.702 m (5' 7\") 99% No 27.36 kg/m2       Blood Pressure from Last 3 Encounters:   10/22/18 (!) 140/92   10/11/18 126/74   08/14/18 (!) 144/96    Weight from Last 3 Encounters:   10/22/18 79.2 kg (174 lb 11.2 oz)   10/11/18 78.9 kg (173 lb 14.4 oz)   08/14/18 81.1 kg (178 lb 12.8 oz)                 Today's Medication Changes          These changes are accurate as of 10/22/18 10:29 AM.  If you have any questions, ask your nurse or doctor.               Stop taking these medicines if you haven't already. Please contact your care team if you have questions.     mometasone-formoterol 200-5 MCG/ACT oral inhaler   Commonly known as:  DULERA   Stopped by:  Susan Sandhu MD                    " Primary Care Provider Office Phone # Fax #    Lien Howard, KENNEDY MARII [phone] [address] [place] [address]        Equal Access to Services     MUSA MATIAS : Hadii aad ku hadjayanto Maria C, waaxda luqadaha, qaybta kaalmada german, leighann perry manjitfloyd hickman juan manuel soto. So [facility] [phone].    ATENCIÓN: Si habla español, tiene a arevalo disposición servicios gratuitos de asistencia lingüística. Llame al [phone].    We comply with applicable federal civil rights laws and [place] laws. We do not discriminate on the basis of race, color, national origin, age, disability, sex, sexual orientation, or gender identity.            Thank you!     Thank you for choosing [facility]  for your care. Our goal is always to provide you with excellent care. Hearing back from our patients is one way we can continue to improve our services. Please take a few minutes to complete the written survey that you may receive in the mail after your visit with us. Thank you!             Your Updated Medication List - Protect others around you: Learn how to safely use, store and throw away your medicines at www.disposemymeds.org.          This list is accurate as of 10/22/18 10:29 AM.  Always use your most recent med list.                   Brand Name Dispense Instructions for use Diagnosis    albuterol 108 (90 Base) MCG/ACT inhaler    PROAIR HFA/PROVENTIL HFA/VENTOLIN HFA    1 Inhaler    Inhale 2 puffs into the lungs every 6 hours    Moderate persistent asthma without complication       ALLERGY INJECTIONS      twice a week        budesonide 1 MG/2ML Susp neb solution    PULMICORT          FISH OIL PO           fluticasone 50 MCG/ACT spray    FLONASE     Spray 1 spray into both nostrils daily    Moderate persistent asthma without complication       hydrochlorothiazide 12.5 MG Tabs tablet     90 tablet    Take 1 tablet (12.5 mg) by mouth daily    Benign essential hypertension        hydrocortisone 2.5 % ointment     30 g    Twice daily as needed to rash by mouth    Dermatitis       loratadine 10 MG tablet    CLARITIN     Take 10 mg by mouth daily        mometasone 0.1 % ointment    ELOCON    90 g    Twice daily to hand rash until clear, then as needed.    Dermatitis       MULTIVITAMIN PO           simvastatin 10 MG tablet    ZOCOR    90 tablet    Take 1 tablet (10 mg) by mouth At Bedtime    Mixed hyperlipidemia       VITAMIN D (CHOLECALCIFEROL) PO      Take by mouth daily

## 2018-10-22 NOTE — LETTER
10/22/2018         RE: Ophelia Caal  [address] [facility] [address]        Dear Colleague,    Thank you for referring your patient, Ophelia Caal, to the [facility]. Please see a copy of my visit note below.    Name: Ophelia Caal  Seen in consultation with Lien Howard for hyperPTH.  HPI:  Ophelia Caal is a 58 year old female who presents for the evaluation of hyperPTH.  Sister with hypercalcemia and hyperparathyroidism.  H/o hyperthyroidism and was on medication for many years. Not on it at this time. Off medication X 35 years.  Recent TSH normal.    Calcium was noted to be slightly high 10.3 2018 ( was started on hydrochlorothiazide around that time)  Repeat labs 2018 normal Calcium 9.6 and high .  Repeat PTH 95 (10/2018)    Feeling OK  No major complaints.  No other FH of hyperpara/hyper Ca apart from her sister as noted above.  No h/o MEN syndrome in family.    History of Cancer:No  Thiazide Diuretic:Yes (Please explain): yes. hydrochlorothiazide X 1 year  Lithium use:No  Family History of pituitary adenoma, pancreas tumors, Zollinger-Carrera syndrome, pheochromocytoma. No  Kidney stones:No  Fractures: No  Abdominal Pain:Yes (Please explain): yes- problems with bowel movement. On and off abdo pain.  Cramps: No  Constipation: No  Muscle Aches or pains: No  Muscle twitches: No  Confusion Lethargy and fatigue: ok  ON medications like Lithium/ HCTZ: no  Average Daily Calcium intake: eats yogurt everyday.  Ca and Vit D supplementation: takes calcium and vit D 5000 international units /day.    PMH/PSH:  Past Medical History:   Diagnosis Date     Thyroid disease      Uncomplicated asthma      Past Surgical History:   Procedure Laterality Date     BREAST SURGERY      duct removal - 2016      SECTION           HYSTERECTOMY      no cervix     HYSTERECTOMY, PAP NO LONGER INDICATED       SINUS SURGERY      deviated septum & polyp removal     TEMPORAL ARTERY  "LIGATN OR BX      temporal artery removed Jan 1999     temporal avm - left      done at [place]     Family Hx:  Family History   Problem Relation Age of Onset     Asthma Son             Social Hx:  Social History     Social History     Marital status:      Spouse name: N/A     Number of children: N/A     Years of education: N/A     Occupational History     Not on file.     Social History Main Topics     Smoking status: Never Smoker     Smokeless tobacco: Never Used     Alcohol use Yes     Drug use: No     Sexual activity: Yes     Partners: Male      Comment: hysterectomy     Other Topics Concern     Not on file     Social History Narrative          MEDICATIONS:  has a current medication list which includes the following prescription(s): albuterol, ALLERGY INJECTIONS, budesonide, fluticasone, hydrochlorothiazide, hydrocortisone, loratadine, mometasone, multiple vitamins-minerals, omega-3 fatty acids, simvastatin, and cholecalciferol.    ROS     ROS: 10 point ROS neg other than the symptoms noted above in the HPI.    Physical Exam   VS: BP (!) 140/92 (BP Location: Right arm, Patient Position: Chair, Cuff Size: Adult Large)  Pulse 73  Temp 97.6  F (36.4  C) (Oral)  Ht 1.702 m (5' 7\")  Wt 79.2 kg (174 lb 11.2 oz)  SpO2 99%  Breastfeeding? No  BMI 27.36 kg/m2  GENERAL: AXOX3, NAD, well dressed, answering questions appropriately, appears stated age.  HEENT: OP clear, no LAD, no TM, non-tender, no exopthalmous, no proptosis, EOMI, no lig lag, no retraction  NECK: Thyroid normal in size, non tender, no nodules were palpated  CV: RRR, no rubs, gallops, no murmurs  LUNGS: CTAB, no wheezes, rales, or ronchi  ABDOMEN: +BS  EXTREMITIES: no edema, +pulses, no rashes, no lesions  NEUROLOGY: CN grossly intact, + DTR upper and lower extremity, no tremors  MSK: grossly intact  SKIN: no rashes, no lesions  PSYCH: normal affect and mood    LABS:  Calcium:  ENDO CALCIUM LABS-UMP Latest Ref Rng & Units 10/2/2018 8/14/2018 "   CALCIUM 8.5 - 10.1 mg/dL 10.1 9.6     ENDO CALCIUM LABS-UMP Latest Ref Rng & Units 2/20/2018 1/9/2018   CALCIUM 8.5 - 10.1 mg/dL 10.3 (H) 9.7     ENDO CALCIUM LABS-UMP Latest Ref Rng & Units 4/28/2017   CALCIUM 8.5 - 10.1 mg/dL 10.1     PTH:  ENDO CALCIUM LABS-UMP Latest Ref Rng & Units 10/2/2018 8/14/2018   PARATHYROID HORMONE INTACT 18 - 80 pg/mL 95 (H) 128 (H)     Vitamin D:  Lab Results   Component Value Date    VITDT 33 10/02/2018       TFTs:  Lab Results   Component Value Date    TSH 2.60 10/02/2018       All pertinent notes, labs, and images personally reviewed by me.     A/P  Ms.Teresa SADAF Caal is a 58 year old here for the evaluation of:    1. Hyperparathyroidism:  In the differential diagnosis of hypercalcemia, primary hyperparathyroidism is the most common cause. It is important to distinguish readily between primary hyperparathyroidism and other causes of hypercalcemia.   Recent Ca normal  PTH improving.  On hydrochlorothiazide  Vit D low normal  Asymptomatic  No h/o fractures or osteoporosis  Normal kidney function.  Plan:  Discussed diagnosis, pathophysiology, management and treatment options of condition with pt.  In the setting of normal calcium, asymptomatic nature and improving PTH plan to continue to monitor.  Increase vit D by 1000 international units / day  Recheck labs in 4-6 months  Please make a lab appointment for blood work and follow up clinic appointment in 1 week after that to discuss results.      HEREDITARY HYPERPARATHYROID STATES  Multiple Endocrine Neoplasia (MEN), both type 1 and type II.  Primary hyperparathyroidism is often the first and is the most common of the endocrinopathies in MEN1, reaching nearly 100% penetrance by the age of 50. In MEN I, the other tumors, besides the parathyroids, that can develop are those of pancreas and anterior pituitary glands. Involvement of two of the three glands confirms the presence of MEN I. MEN IIa, in which hyperparathyroidism can be  associated with medullary thyroid cancer.    Hyperparathyroidism jaw tumor syndrome (AD), is associated with PHPT and fibromas in the mandible or the maxilla. Unlike FHH or the MEN I and II, parathyroid carcinoma is more common in hyperparathyroidism jaw tumor syndrome.     Familial hypocalciuric hypercalcemia (FHH). This presentation that can be confused with the most common form of primary hyperparathyroidism, namely the sporadic isolated disorder. FHH is also known as Familial Benign Hypercalcemia because it is generally asymptomatic and does not require treatment.  Lab work to differentiate FHH from primary PTH Hypercalcemia, Hypocalciuria, Normal to high levels of PTH, Hypermagnesemia, Calcium/creatinine clearance ratio <0.01, and Genetic testing for mutations in CASR.     The serum calcium determination is typically not greater than 1 mg/dl above the upper limits of normal. The serum phosphorus is in the lower range of normal with only approximately 25% of patients showing phosphorus levels that are frankly low. Total alkaline phosphatase activity is in the high normal range as is the case also for more specific markers of bone turnover and, bone-specific alkaline phosphatase activity. If the normal concentration of 25-hydroxyvitamin D level is taken to be >30 ng/ml, then most patients with primary hyperparathyroidism will have low levels. In contrast, the 1,25-dihydroxyvitamin D level tends to be in the upper range of normal and, in fact, frankly elevated in 25% of patients with primary hyperparathyroidism.    Guidelines for parathyroid surgery in asymptomatic primary hyperparathyroidism:    Serum Calcium >1.0 mg/dl (0.25 mmol/L) above normal   Creatinine Clearance (calculated) Below 60m1/min /1.73 m2)   Bone Mineral Density T score < -2.5 SD at spine, hip (total or femoral neck) or radius (distal 1/3 site) or presence of fragility fracture   Age Age < 50 years        PARATHYROID GLAND IMAGING  Pre- operative  imaging is of value prior to surgery in the localization of abnormal parathyroid tissue . The diagnosis of PHPT is based on the biochemical findings and is not affected by the results of the imaging studies. Sestamibi imaging is of value in localizing abnormal parathyroid tissue. The sensitivity and specificity varies among institutions.    More than 50% of the time spent with Ms. Caal on counseling / coordinating her care.     Follow-up:  4-6 months.    Susan Sandhu MD  Endocrinology   [facility]  October 22, 2018  CC: Lien Howard      Again, thank you for allowing me to participate in the care of your patient.        Sincerely,        Susan Sandhu MD

## 2018-10-31 ENCOUNTER — MYC MEDICAL ADVICE (OUTPATIENT)
Dept: PEDIATRICS | Facility: CLINIC | Age: 58
End: 2018-10-31

## 2018-10-31 NOTE — LETTER
[address]  [address]  [facility] [address]  Phone: [phone]  Fax: [phone]    November 1, 2018        Ophelia Caal  [address] [facility] [address]          To whom it may concern:    RE: Ophelia Caal    This patient sees me for allergic rhinitis, headaches, and sinusitis for which I recommended acupuncture. This therapy has helped her significantly and I recommend that she continue.     Please contact me for questions or concerns.      Sincerely,        KENNEDY Ruby CNP

## 2018-11-01 NOTE — TELEPHONE ENCOUNTER
Lien, I don't see a referral to Accupuncture.  I don't see headaches on her problem list.  No recent treatment from you for sinusitis.  Please review.  FAVIOLA Green RN

## 2018-11-01 NOTE — TELEPHONE ENCOUNTER
Signed and placed in my outbox  If you could fax the document to Esau Billy at [facility]. Fax #[phone].

## 2018-11-06 ENCOUNTER — OFFICE VISIT (OUTPATIENT)
Dept: DERMATOLOGY | Facility: CLINIC | Age: 58
End: 2018-11-06
Payer: COMMERCIAL

## 2018-11-06 DIAGNOSIS — L82.1 SEBORRHEIC KERATOSIS: Primary | ICD-10-CM

## 2018-11-06 DIAGNOSIS — L81.4 SOLAR LENTIGINOSIS: ICD-10-CM

## 2018-11-06 DIAGNOSIS — D22.9 MULTIPLE NEVI: ICD-10-CM

## 2018-11-06 PROCEDURE — 99213 OFFICE O/P EST LOW 20 MIN: CPT | Performed by: DERMATOLOGY

## 2018-11-06 NOTE — PATIENT INSTRUCTIONS
Pediatric Dermatology  [facility]  [address]  1st [place]  [address]  Phone: [phone]    Pediatric & Adult Dermatology  [facility]  [address] [facility]   2nd Floor  [facility] [address]  [phone]                  General information: Dr. Corrine Howard is a board-certified dermatologist with subspecialty certification in pediatric dermatology.     Scheduling and Nurse Triage: Dr. Howard sees pediatric patients on Mondays in [place] and adult and pediatric patients on Tuesdays in [place]. The remainder of the [facility]. Please call the above phone numbers to schedule or to talk to a nurse.     -For scheduling at the [place] or [place] locations, or to talk to the triage nurse please call the above phone number at the clinic where you were seen.     -For medication refills, please call your pharmacy.

## 2018-11-06 NOTE — LETTER
11/6/2018      RE: Ophelia Caal  [address] [facility] [address]       Dermatology Clinic Visit Note      Service Date: 11/06/2018      CHIEF COMPLAINT:  Spots on arms.      DERMATOLOGY PROBLEM LIST:   1.  History of dysplastic nevus on abdomen removed in [place] in 2008.   2.  Multiple pigmented nevi.   3.  Seborrheic keratoses.   4.  Dermatitis of hands and lower face.      HISTORY OF PRESENT ILLNESS:  Ms. Caal is a 58-year-old female returning to Dermatology Clinic for evaluation of bumps on her arms.  She has a past history of dysplastic nevus, but no history of skin cancer.  She notes most of the spots have arisen over the past year.  They are occasionally pruritic.  She denies any new or changing lesions on her body.  No bleeding areas.   Patient Active Problem List   Diagnosis     Moderate persistent asthma without complication     Anxiety     Benign essential hypertension     Dermatitis     Neoplasm of uncertain behavior     Seborrheic keratosis     Multiple nevi     Solar lentiginosis     History of arteriovenous malformation     Mixed hyperlipidemia     Hyperparathyroidism (H)       Allergies   Allergen Reactions     Fentanyl Hives     Itchiness, nausea         Current Outpatient Prescriptions   Medication     albuterol (PROAIR HFA/PROVENTIL HFA/VENTOLIN HFA) 108 (90 BASE) MCG/ACT Inhaler     ALLERGY INJECTIONS     budesonide (PULMICORT) 1 MG/2ML SUSP neb solution     fluticasone (FLONASE) 50 MCG/ACT spray     hydrochlorothiazide 12.5 MG TABS tablet     hydrocortisone 2.5 % ointment     loratadine (CLARITIN) 10 MG tablet     mometasone (ELOCON) 0.1 % ointment     Multiple Vitamins-Minerals (MULTIVITAMIN PO)     Omega-3 Fatty Acids (FISH OIL PO)     simvastatin (ZOCOR) 10 MG tablet     VITAMIN D, CHOLECALCIFEROL, PO     No current facility-administered medications for this visit.           SOCIAL HISTORY:  The patient is  and lives in [place].      FAMILY HISTORY:  Mother with squamous cell  carcinoma of the skin.      REVIEW OF SYSTEMS:  Feels well without additional skin concerns.      PHYSICAL EXAMINATION:   GENERAL:  The patient is a healthy-appearing, 58-year-old female in no distress.   HEENT:  Conjunctivae are clear.   PULMONARY:  Breathing comfortably on room air.   ABDOMEN:  No abdominal distention.   SKIN:  Exam today includes the face, neck, chest, abdomen, back, arms and hands.  Skin exam is normal except for as follows:   - Examination of the upper and lower back, anterior chest, abdomen, arms and hands with scattered, 3-4 mm, medium- and dark-brown macules, many with globular pigment network centrally.   - Linear, dark-brown, evenly pigmented, 3 mm macule on right upper back with pigmentation surrounding a bright-red cherry angioma, 1 mm.   - Waxy, tan and hypopigmented papules on the dorsal arms and upper arms.   - Multiple oval scars at past biopsy sites.   - Scattered, cherry-red papules on chest, abdomen and back.      ASSESSMENT AND PLAN:   1.  Seborrheic keratoses on arms and trunk:  Benign hyperkeratotic papules.  No treatment advised.   2.  History of dysplastic nevus on abdomen:  No signs of recurrence.   3.  Multiple pigmented nevi:  Ms. Caal has smaller nevi that are dark in pigmentation, but consistent overall.  Yearly skin check recommended.   4.  Solar lentigines, benign:  Marker of past sun injury.  Ongoing sun protection recommended.      Ms. Caal to return in 1 year's time or sooner if concern.     Corrine Howard MD  Dermatology Staff       cc:   KENNEDY Lindsey, [place] [address]                 D: 2018   T: 2018   MT: alexys      Name:     KENNY CAAL   MRN:      [id number]        Account:      [id number]   :      1960           Service Date: 2018      Document: [id number]

## 2018-11-07 NOTE — PROGRESS NOTES
Dermatology Clinic Visit Note      Service Date: 11/06/2018      CHIEF COMPLAINT:  Spots on arms.      DERMATOLOGY PROBLEM LIST:   1.  History of dysplastic nevus on abdomen removed in [place] in 2008.   2.  Multiple pigmented nevi.   3.  Seborrheic keratoses.   4.  Dermatitis of hands and lower face.      HISTORY OF PRESENT ILLNESS:  Ms. Caal is a 58-year-old female returning to Dermatology Clinic for evaluation of bumps on her arms.  She has a past history of dysplastic nevus, but no history of skin cancer.  She notes most of the spots have arisen over the past year.  They are occasionally pruritic.  She denies any new or changing lesions on her body.  No bleeding areas.   Patient Active Problem List   Diagnosis     Moderate persistent asthma without complication     Anxiety     Benign essential hypertension     Dermatitis     Neoplasm of uncertain behavior     Seborrheic keratosis     Multiple nevi     Solar lentiginosis     History of arteriovenous malformation     Mixed hyperlipidemia     Hyperparathyroidism (H)       Allergies   Allergen Reactions     Fentanyl Hives     Itchiness, nausea         Current Outpatient Prescriptions   Medication     albuterol (PROAIR HFA/PROVENTIL HFA/VENTOLIN HFA) 108 (90 BASE) MCG/ACT Inhaler     ALLERGY INJECTIONS     budesonide (PULMICORT) 1 MG/2ML SUSP neb solution     fluticasone (FLONASE) 50 MCG/ACT spray     hydrochlorothiazide 12.5 MG TABS tablet     hydrocortisone 2.5 % ointment     loratadine (CLARITIN) 10 MG tablet     mometasone (ELOCON) 0.1 % ointment     Multiple Vitamins-Minerals (MULTIVITAMIN PO)     Omega-3 Fatty Acids (FISH OIL PO)     simvastatin (ZOCOR) 10 MG tablet     VITAMIN D, CHOLECALCIFEROL, PO     No current facility-administered medications for this visit.           SOCIAL HISTORY:  The patient is  and lives in [place].      FAMILY HISTORY:  Mother with squamous cell carcinoma of the skin.      REVIEW OF SYSTEMS:  Feels well without additional  skin concerns.      PHYSICAL EXAMINATION:   GENERAL:  The patient is a healthy-appearing, 58-year-old female in no distress.   HEENT:  Conjunctivae are clear.   PULMONARY:  Breathing comfortably on room air.   ABDOMEN:  No abdominal distention.   SKIN:  Exam today includes the face, neck, chest, abdomen, back, arms and hands.  Skin exam is normal except for as follows:   - Examination of the upper and lower back, anterior chest, abdomen, arms and hands with scattered, 3-4 mm, medium- and dark-brown macules, many with globular pigment network centrally.   - Linear, dark-brown, evenly pigmented, 3 mm macule on right upper back with pigmentation surrounding a bright-red cherry angioma, 1 mm.   - Waxy, tan and hypopigmented papules on the dorsal arms and upper arms.   - Multiple oval scars at past biopsy sites.   - Scattered, cherry-red papules on chest, abdomen and back.      ASSESSMENT AND PLAN:   1.  Seborrheic keratoses on arms and trunk:  Benign hyperkeratotic papules.  No treatment advised.   2.  History of dysplastic nevus on abdomen:  No signs of recurrence.   3.  Multiple pigmented nevi:  Ms. Streeter has smaller nevi that are dark in pigmentation, but consistent overall.  Yearly skin check recommended.   4.  Solar lentigines, benign:  Marker of past sun injury.  Ongoing sun protection recommended.      Ms. Streeter to return in 1 year's time or sooner if concern.     Corrine Howard MD  Dermatology Staff       cc:   KENNED[place] [address]         CORRINE HOWARD MD             D: 2018   T: 2018   MT: alexys      Name:     KENNY STREETER   MRN:      [id number]        Account:      [id number]   :      1960           Service Date: 2018      Document: [id number]

## 2019-03-14 ENCOUNTER — TELEPHONE (OUTPATIENT)
Dept: PEDIATRICS | Facility: CLINIC | Age: 59
End: 2019-03-14

## 2019-03-14 NOTE — LETTER
March 19, 2019      Ophelia Caal  [address] [place] [address]        Dear Ophelia,       We care about your health and have reviewed your health plan including your medical conditions, medications, and lab results.  Based on this review, it is recommended that you follow up regarding the following health topic(s):  -Asthma  -High Blood Pressure    We recommend you take the following action(s):  -schedule a FOLLOWUP OFFICE APPOINTMENT.  We will perform the following labs:  Lipids (fasting cholesterol - nothing to eat except water and/or meds for 8-10 hours).     Please call us at the [facility] - [phone] (or use [employer]) to address the above recommendations.     Thank you for trusting [facility] and we appreciate the opportunity to serve you.  We look forward to supporting your healthcare needs in the future.    Healthy Regards,    Your Health Care Team  [facility]

## 2019-03-14 NOTE — TELEPHONE ENCOUNTER
Panel Management Review      Patient has the following on her problem list:     Asthma review     ACT Total Scores 8/14/2018   ACT TOTAL SCORE (Goal Greater than or Equal to 20) 25   In the past 12 months, how many times did you visit the emergency room for your asthma without being admitted to the hospital? 0   In the past 12 months, how many times were you hospitalized overnight because of your asthma? 0      1. Is Asthma diagnosis on the Problem List? Yes    2. Is Asthma listed on Health Maintenance? Yes    3. Patient is due for:  ACT    Hypertension   Last three blood pressure readings:  BP Readings from Last 3 Encounters:   10/22/18 (!) 140/92   10/11/18 126/74   08/14/18 (!) 144/96     Blood pressure: FAILED    HTN Guidelines:  Age 18-59 BP range:  Less than 140/90  Age 60-85 with Diabetes:  Less than 140/90  Age 60-85 without Diabetes:  less than 150/90      Composite cancer screening  Chart review shows that this patient is due/due soon for the following None  Summary:    Patient is due/failing the following:   HTN followup, ACT, BP CHECK and LDL    Action needed:   Patient needs office visit for HTN follow up. Patient needs to do ACT. and Patient needs fasting lab only appointment - cholesterol    Type of outreach:    Sent [employer] message.    Questions for provider review:    None                                                                                                                                  Shelby Villa CMA    Chart routed to Care Team .

## 2019-03-26 NOTE — TELEPHONE ENCOUNTER
Called and left VM for patient to contact clinic.  Patient needs HTN FOLLOW UP, ASTHMA FOLLOW UP, and fasting labs.  Shelby Villa, CMA

## 2019-09-11 ENCOUNTER — MYC MEDICAL ADVICE (OUTPATIENT)
Dept: FAMILY MEDICINE | Facility: CLINIC | Age: 59
End: 2019-09-11

## 2019-09-24 ENCOUNTER — TELEPHONE (OUTPATIENT)
Dept: PEDIATRICS | Facility: CLINIC | Age: 59
End: 2019-09-24

## 2019-09-24 NOTE — LETTER
September 27, 2019      Ophelia Caal  [address] [place] [address]        Dear Ophelia,       We care about your health and have reviewed your health plan including your medical conditions, medications, and lab results.  Based on this review, it is recommended that you follow up regarding the following health topic(s):  -Asthma  -High Blood Pressure  -Breast Cancer Screening  -Wellness (Physical) Visit   According to our records you have not been seen by your provider since 10-11-18. [place] requires providers to see patients at least once per year in order to continue refilling medications.    We recommend you take the following action(s):  -schedule a WELLNESS (Physical) APPOINTMENT.  We will perform the following labs: Lipids (fasting cholesterol - nothing to eat except water and/or meds for 8-10 hours).  -schedule a MAMMOGRAM which is due. Please disregard this reminder if you have had this exam elsewhere within the last 1-2 years please let us know so we can update your records.     Please call us at the [facility] - [phone] (or use [employer]) to address the above recommendations.     Thank you for trusting [facility] and we appreciate the opportunity to serve you.  We look forward to supporting your healthcare needs in the future.    Healthy Regards,    Your Health Care Team  [facility]

## 2019-09-24 NOTE — TELEPHONE ENCOUNTER
Panel Management Review      Patient has the following on her problem list:     Asthma review     ACT Total Scores 8/14/2018   ACT TOTAL SCORE (Goal Greater than or Equal to 20) 25   In the past 12 months, how many times did you visit the emergency room for your asthma without being admitted to the hospital? 0   In the past 12 months, how many times were you hospitalized overnight because of your asthma? 0      1. Is Asthma diagnosis on the Problem List? Yes    2. Is Asthma listed on Health Maintenance? Yes    3. Patient is due for:  ACT and AAP    Hypertension   Last three blood pressure readings:  BP Readings from Last 3 Encounters:   10/22/18 (!) 140/92   10/11/18 126/74   08/14/18 (!) 144/96     Blood pressure: FAILED    HTN Guidelines:  Less than 140/90      Composite cancer screening  Chart review shows that this patient is due/due soon for the following Mammogram  Summary:    Patient is due/failing the following:   AAP, ACT, BP CHECK, FOLLOW UP, MAMMOGRAM and PHYSICAL    Action needed:   Patient needs office visit for physical, mammogram, asthma & HTN f/u, fasting labs.  Last seen 10-11-18    Type of outreach:    Sent [employer] message.    Questions for provider review:    None                                                                                                                                  Shelby Villa CMA    Chart routed to Care Team .

## 2019-10-07 NOTE — TELEPHONE ENCOUNTER
Called and left VM for patient to contact clinic.  Patient needs physical, mammogram, HTN and asthma follow-up.  Last seen 10-11-18.  Shelby Villa, CMA

## 2019-10-17 DIAGNOSIS — Z12.31 VISIT FOR SCREENING MAMMOGRAM: ICD-10-CM

## 2019-10-17 PROCEDURE — 77067 SCR MAMMO BI INCL CAD: CPT | Mod: TC

## 2019-12-08 ENCOUNTER — HEALTH MAINTENANCE LETTER (OUTPATIENT)
Age: 59
End: 2019-12-08

## 2019-12-11 ENCOUNTER — PRE VISIT (OUTPATIENT)
Dept: PEDIATRICS | Facility: CLINIC | Age: 59
End: 2019-12-11

## 2019-12-11 NOTE — TELEPHONE ENCOUNTER
Called pt, no answer. LVM to call clinic back at [phone] with any questions, concerns, or if they need to reschedule.    Kalpesh Simpson, EMT at 2:27 PM on December 11, 2019   [facility] Health Guide   [phone]

## 2019-12-29 ENCOUNTER — TRANSFERRED RECORDS (OUTPATIENT)
Dept: HEALTH INFORMATION MANAGEMENT | Facility: CLINIC | Age: 59
End: 2019-12-29

## 2019-12-29 LAB
ALT SERPL-CCNC: 28 IU/L (ref 8–45)
AST SERPL-CCNC: 27 IU/L (ref 2–40)
CREAT SERPL-MCNC: 0.98 MG/DL (ref 0.57–1.11)
GFR SERPL CREATININE-BSD FRML MDRD: 58 ML/MIN/1.73M2
GLUCOSE SERPL-MCNC: 98 MG/DL (ref 65–100)
POTASSIUM SERPL-SCNC: 4.5 MMOL/L (ref 3.5–5)
TSH SERPL-ACNC: 1.37 ULU/ML (ref 0.35–4.94)

## 2019-12-31 ENCOUNTER — PRE VISIT (OUTPATIENT)
Dept: PEDIATRICS | Facility: CLINIC | Age: 59
End: 2019-12-31

## 2019-12-31 NOTE — PROGRESS NOTES
Pre-Visit Planning     Future Appointments   Date Time Provider Department Center   1/3/2020  2:55 PM Imani Espino APRN CNP [facility]   1/15/2020  8:20 AM Manisha Celis PA-C [facility] [facility]     Arrival Time for this Appointment:  2:45 PM   Appointment Notes for this encounter:   hospital follow up htn, chest pain    Questionnaires Reviewed/Assigned  Additional questionnaires assigned PHQ-2      Patient preferred phone number: [phone]    Unable to reach. Left voicemail. Advised patient to call clinic back at [phone] with any questions, concerns, or if they need to reschedule.    Kalpesh Simpson, EMT at 11:31 AM on December 31, 2019   Clinic Health Guide   [phone]

## 2019-12-31 NOTE — TELEPHONE ENCOUNTER
Called pt, no answer. LVM to call clinic back at [phone] with any questions, concerns, or if they need to reschedule.    Kalpesh Simpson, EMT at 11:35 AM on December 31, 2019   [facility] Health Guide   [phone]

## 2019-12-31 NOTE — PROGRESS NOTES
Subjective     Ophelia Caal is a 59 year old female who presents to clinic today for the following health issues:    HPI     ED/UC Followup:    Facility:  [facility]   Date of visit: 19-19  Reason for visit: Chief Complaint: Chest tightness, L Hand burn, Difficulty Breathing; Blood Pressure; and Difficulty Swallowing  Current Status: Pt reports she is okay. Still has some acid reflux. BP is still high but better. Pt is still feeling anxious. Burn is healing.       /111 on admission. Given IV metoprolol during hospitalization. Discharged on losartan 50 mg  She has been taking 25 mg daily  Discharged on Keflex and Pantoprazole. Had ECG 19  Has appointment with GI next in the next 1-2 weeks  Pantoprazole not covered by insurance so she has omar taking omeprazole 20 mg daily which she bought over the counter  She continues to have heart burn  Non exertional    Saw wound nurse in hospital and given wound care instructions for burn on left hand  Denies fever, chills, nausea, vomiting    She is experiencing a lot of anxiety d/t caregiver stress  Mom recently diagnosed with lung cancer  Father has alzheimer's    Hx hyperparathyroidism, diagnosed   Calcium 10.3  Normalized at recheck with elevated PTH at 128  As PTH was improving, recommended monitoring  Overdue for labs    Patient Active Problem List   Diagnosis     Moderate persistent asthma without complication     Anxiety     Benign essential hypertension     Dermatitis     Neoplasm of uncertain behavior     Seborrheic keratosis     Multiple nevi     Solar lentiginosis     History of arteriovenous malformation     Mixed hyperlipidemia     Hyperparathyroidism (H)     Past Surgical History:   Procedure Laterality Date     BREAST SURGERY      duct removal - 2016      SECTION           HYSTERECTOMY      no cervix     HYSTERECTOMY, PAP NO LONGER INDICATED       SINUS SURGERY      deviated septum & polyp removal      TEMPORAL ARTERY LIGATN OR BX      temporal artery removed Jan 1999     temporal avm - left      done at [place]       Social History     Tobacco Use     Smoking status: Never Smoker     Smokeless tobacco: Never Used   Substance Use Topics     Alcohol use: Yes     Comment: Typically 1-2 per week about 2 drinks     Family History   Problem Relation Age of Onset     Asthma Son          Current Outpatient Medications   Medication Sig Dispense Refill     citalopram (CELEXA) 20 MG tablet Take 1/2 tablet daily for 1-2 weeks until tolerated then increase to 1 tabetl daily 30 tablet 1     omeprazole (PRILOSEC) 40 MG DR capsule Take 1 capsule (40 mg) by mouth daily 30 capsule 1     albuterol (PROAIR HFA/PROVENTIL HFA/VENTOLIN HFA) 108 (90 BASE) MCG/ACT Inhaler Inhale 2 puffs into the lungs every 6 hours 1 Inhaler 3     ALLERGY INJECTIONS twice a week       fluticasone (FLONASE) 50 MCG/ACT spray Spray 1 spray into both nostrils daily       hydrocortisone 2.5 % ointment Twice daily as needed to rash by mouth 30 g 1     loratadine (CLARITIN) 10 MG tablet Take 10 mg by mouth daily       mometasone (ELOCON) 0.1 % ointment Twice daily to hand rash until clear, then as needed. 90 g 2     Multiple Vitamins-Minerals (MULTIVITAMIN PO)        Omega-3 Fatty Acids (FISH OIL PO)        simvastatin (ZOCOR) 10 MG tablet Take 1 tablet (10 mg) by mouth At Bedtime 90 tablet 3     VITAMIN D, CHOLECALCIFEROL, PO Take by mouth daily       BP Readings from Last 3 Encounters:   01/03/20 124/70   10/22/18 (!) 140/92   10/11/18 126/74    Wt Readings from Last 3 Encounters:   01/03/20 83.6 kg (184 lb 6.4 oz)   10/22/18 79.2 kg (174 lb 11.2 oz)   10/11/18 78.9 kg (173 lb 14.4 oz)                    Reviewed and updated as needed this visit by Provider         Review of Systems   ROS COMP: Constitutional, HEENT, cardiovascular, pulmonary, gi and gu systems are negative, except as otherwise noted.      Objective    /54 (BP Location: Right arm,  "Patient Position: Sitting, Cuff Size: Adult Regular)   Pulse 93   Temp 98.6  F (37  C) (Tympanic)   Ht 1.687 m (5' 6.42\")   Wt 83.6 kg (184 lb 6.4 oz)   SpO2 97%   BMI 29.39 kg/m    Body mass index is 29.39 kg/m .  Physical Exam   GENERAL: healthy, alert and no distress  RESP: lungs clear to auscultation - no rales, rhonchi or wheezes  CV: regular rate and rhythm, normal S1 S2, no S3 or S4, no murmur, click or rub, no peripheral edema and peripheral pulses strong  ABDOMEN: soft, nontender, no hepatosplenomegaly, no masses and bowel sounds normal  SKIN: Approximately 5 x 20 mm burn with granulation tissue present. No surrounding erythema or purulence    Diagnostic Test Results:  Labs reviewed in Epic        Assessment & Plan       ICD-10-CM    1. Chest pain, unspecified type R07.9    2. Benign essential hypertension I10    3. Hyperparathyroidism (H) E21.3    4. Anxiety F41.9    5. Moderate persistent asthma without complication J45.40    6. Gastroesophageal reflux disease, esophagitis presence not specified K21.9 omeprazole (PRILOSEC) 40 MG DR capsule   7. Caregiver stress Z63.6 citalopram (CELEXA) 20 MG tablet        BMI:   Estimated body mass index is 29.39 kg/m  as calculated from the following:    Height as of this encounter: 1.687 m (5' 6.42\").    Weight as of this encounter: 83.6 kg (184 lb 6.4 oz).     Has follow up with GI in the coming weeks and can evaluate for improvement in symptoms with increase in PPI  BP on high end of normal at 25 mg losartan. Would recommend increasing to 50 mg as prescribed but she attributes increase in BP to stress and is hopeful BP will improve with treatment and stress and anxiety. She lives in [place] and works from home. Has upcoming appointment at our [place] clinic to establish care. Can recheck BP at that time.   Discussed starting citalopram. Reviewed risks and benefits of serotonin specific reuptake inhibitor, including risk for depression and black box warning for " risk of suicide. Advised may take 2-4 weeks to achieve therapeutic benefit. Can f/u on anxiety and stress at upcoming appointment in [place]      Patient Instructions   I increased the prilosec to 40 mg daily. You can finish your current supply by taking 2, 20 mg capsules daily    It is ok to stay with 25 mg losartan for now. You will this rechecked at your upcoming appointment. If it is still high, I would recommend increasing to 50 mg    Check out alzheimer association to see if there is a support group in your area    Start Celexa - 1/2 tablet daily for 1-2 weeks until tolerated and then 1 tablet dailiy      Return in about 2 weeks (around 1/17/2020) for Physical Exam.    KENNEDY [facility] LATOYA

## 2020-01-03 ENCOUNTER — OFFICE VISIT (OUTPATIENT)
Dept: PEDIATRICS | Facility: CLINIC | Age: 60
End: 2020-01-03
Payer: COMMERCIAL

## 2020-01-03 VITALS
WEIGHT: 184.4 LBS | HEIGHT: 66 IN | OXYGEN SATURATION: 97 % | SYSTOLIC BLOOD PRESSURE: 124 MMHG | HEART RATE: 93 BPM | BODY MASS INDEX: 29.63 KG/M2 | DIASTOLIC BLOOD PRESSURE: 70 MMHG | TEMPERATURE: 98.6 F

## 2020-01-03 DIAGNOSIS — J45.40 MODERATE PERSISTENT ASTHMA WITHOUT COMPLICATION: ICD-10-CM

## 2020-01-03 DIAGNOSIS — F41.9 ANXIETY: ICD-10-CM

## 2020-01-03 DIAGNOSIS — I10 BENIGN ESSENTIAL HYPERTENSION: ICD-10-CM

## 2020-01-03 DIAGNOSIS — K21.9 GASTROESOPHAGEAL REFLUX DISEASE, ESOPHAGITIS PRESENCE NOT SPECIFIED: ICD-10-CM

## 2020-01-03 DIAGNOSIS — R07.9 CHEST PAIN, UNSPECIFIED TYPE: Primary | ICD-10-CM

## 2020-01-03 DIAGNOSIS — E21.3 HYPERPARATHYROIDISM (H): ICD-10-CM

## 2020-01-03 DIAGNOSIS — Z63.6 CAREGIVER STRESS: ICD-10-CM

## 2020-01-03 PROCEDURE — 99214 OFFICE O/P EST MOD 30 MIN: CPT | Performed by: NURSE PRACTITIONER

## 2020-01-03 RX ORDER — OMEPRAZOLE 40 MG/1
40 CAPSULE, DELAYED RELEASE ORAL DAILY
Qty: 30 CAPSULE | Refills: 1 | Status: SHIPPED | OUTPATIENT
Start: 2020-01-03 | End: 2020-01-28

## 2020-01-03 RX ORDER — CITALOPRAM HYDROBROMIDE 20 MG/1
TABLET ORAL
Qty: 30 TABLET | Refills: 1 | Status: SHIPPED | OUTPATIENT
Start: 2020-01-03 | End: 2020-01-28

## 2020-01-03 SDOH — SOCIAL STABILITY - SOCIAL INSECURITY: DEPENDENT RELATIVE NEEDING CARE AT HOME: Z63.6

## 2020-01-03 ASSESSMENT — MIFFLIN-ST. JEOR: SCORE: 1439.8

## 2020-01-03 NOTE — PATIENT INSTRUCTIONS
I increased the prilosec to 40 mg daily. You can finish your current supply by taking 2, 20 mg capsules daily    It is ok to stay with 25 mg losartan for now. You will this rechecked at your upcoming appointment. If it is still high, I would recommend increasing to 50 mg    Check out alzheimer association to see if there is a support group in your area    Start Celexa - 1/2 tablet daily for 1-2 weeks until tolerated and then 1 tablet dailiy

## 2020-01-03 NOTE — LETTER
My Asthma Action Plan  Name: Ophelia Caal   YOB: 1960  Date: 1/3/2020   My doctor: Lien Howard   My clinic: [facility]      My Control Medicine: Dulera        Dose: 1-2 puffs per day  My Rescue Medicine: Ventolin        Dose: as needed 1 puff-2puff  My Oral Steroid Medicine: None My Asthma Severity: Intermittent / Exercise Induced  Avoid your asthma triggers: strong odors and fumes and exercise or sports        GREEN ZONE   Good Control    I feel good    No cough or wheeze    Can work, sleep and play without asthma symptoms       Take your asthma control medicine every day.     1. If exercise triggers your asthma, take your rescue medication    15 minutes before exercise or sports, and    During exercise if you have asthma symptoms  2. Spacer to use with inhaler: If you have a spacer, make sure to use it with your inhaler             YELLOW ZONE Getting Worse  I have ANY of these:    I do not feel good    Cough or wheeze    Chest feels tight    Wake up at night   1. Keep taking your Green Zone medications  2. Start taking your rescue medicine:    every 20 minutes for up to 1 hour. Then every 4 hours for 24-48 hours.  3. If you stay in the Yellow Zone for more than 12-24 hours, contact your doctor.  4. If you do not return to the Green Zone in 12-24 hours or you get worse, start taking your oral steroid medicine if prescribed by your provider.           RED ZONE Medical Alert - Get Help  I have ANY of these:    I feel awful    Medicine is not helping    Breathing getting harder    Trouble walking or talking    Nose opens wide to breathe       1. Take your rescue medicine NOW  2. If your provider has prescribed an oral steroid medicine, start taking it NOW  3. Call your doctor NOW  4. If you are still in the Red Zone after 20 minutes and you have not reached your doctor:    Take your rescue medicine again and    Call 911 or go to the emergency room right away    See your  regular doctor within 2 weeks of an Emergency Room or Urgent Care visit for follow-up treatment.        The above medication may be given at school or day care?: Yes and N/A (Adult Patient)  Child can carry and use inhaler(s) at school with approval of school nurse?: Yes and N/A (Adult Patient)    Electronically signed by: Kalpesh Simpson EMT, January 3, 2020    Annual Reminders:  Meet with Asthma Educator,  Flu Shot in the Fall, consider Pneumonia Vaccination for patients with asthma (aged 19 and older).    Pharmacy:    [id number] - [place], [place] - [address] [id number] - [place], [place] - [address] AT [facility] [address]                    Asthma Triggers  How To Control Things That Make Your Asthma Worse    Triggers are things that make your asthma worse.  Look at the list below to help you find your triggers and what you can do about them.  You can help prevent asthma flare-ups by staying away from your triggers.      Trigger                                                          What you can do   Cigarette Smoke  Tobacco smoke can make asthma worse. Do not allow smoking in your home, car or around you.  Be sure no one smokes at a child s day care or school.  If you smoke, ask your health care provider for ways to help you quit.  Ask family members to quit too.  Ask your health care provider for a referral to Quit Plan to help you quit smoking, or call 4-709-831-PLAN.     Colds, Flu, Bronchitis  These are common triggers of asthma. Wash your hands often.  Don t touch your eyes, nose or mouth.  Get a flu shot every year.     Dust Mites  These are tiny bugs that live in cloth or carpet. They are too small to see. Wash sheets and blankets in hot water every week.   Encase pillows and mattress in dust mite proof covers.  Avoid having carpet if you can. If you have carpet, vacuum weekly.   Use a dust mask and HEPA vacuum.   Pollen and Outdoor  Mold  Some people are allergic to trees, grass, or weed pollen, or molds. Try to keep your windows closed.  Limit time out doors when pollen count is high.   Ask you health care provider about taking medicine during allergy season.     Animal Dander  Some people are allergic to skin flakes, urine or saliva from pets with fur or feathers. Keep pets with fur or feathers out of your home.    If you can t keep the pet outdoors, then keep the pet out of your bedroom.  Keep the bedroom door closed.  Keep pets off cloth furniture and away from stuffed toys.     Mice, Rats, and Cockroaches  Some people are allergic to the waste from these pests.   Cover food and garbage.  Clean up spills and food crumbs.  Store grease in the refrigerator.   Keep food out of the bedroom.   Indoor Mold  This can be a trigger if your home has high moisture. Fix leaking faucets, pipes, or other sources of water.   Clean moldy surfaces.  Dehumidify basement if it is damp and smelly.   Smoke, Strong Odors, and Sprays  These can reduce air quality. Stay away from strong odors and sprays, such as perfume, powder, hair spray, paints, smoke incense, paint, cleaning products, candles and new carpet.   Exercise or Sports  Some people with asthma have this trigger. Be active!  Ask your doctor about taking medicine before sports or exercise to prevent symptoms.    Warm up for 5-10 minutes before and after sports or exercise.     Other Triggers of Asthma  Cold air:  Cover your nose and mouth with a scarf.  Sometimes laughing or crying can be a trigger.  Some medicines and food can trigger asthma.

## 2020-01-04 ASSESSMENT — ASTHMA QUESTIONNAIRES: ACT_TOTALSCORE: 22

## 2020-01-21 NOTE — PATIENT INSTRUCTIONS

## 2020-01-21 NOTE — PROGRESS NOTES
"   SUBJECTIVE:   CC: Ophelia Caal is an 59 year old woman who presents for preventive health visit.     Healthy Habits:    Do you get at least three servings of calcium containing foods daily (dairy, green leafy vegetables, etc.)? { :456595::\"yes\"}    Amount of exercise or daily activities, outside of work: { :880132}    Problems taking medications regularly { :155157::\"No\"}    Medication side effects: { :398993::\"No\"}    Have you had an eye exam in the past two years? { :292901}    Do you see a dentist twice per year? { :599722}    Do you have sleep apnea, excessive snoring or daytime drowsiness?{ :388779}  {Outside tests to abstract? :463713}    {additional problems to add (Optional):141276}    Today's PHQ-2 Score:   PHQ-2 ( 1999 [employer]) 1/3/2020 10/11/2018   Q1: Little interest or pleasure in doing things 1 0   Q2: Feeling down, depressed or hopeless 1 0   PHQ-2 Score 2 0   Q1: Little interest or pleasure in doing things Several days -   Q2: Feeling down, depressed or hopeless Several days -   PHQ-2 Score 2 -     {PHQ-2 LOOK IN ASSESSMENTS (Optional) :907827}  Abuse: Current or Past(Physical, Sexual or Emotional)- {YES/NO/NA:934669}  Do you feel safe in your environment? {YES/NO/NA:193396}        Social History     Tobacco Use     Smoking status: Never Smoker     Smokeless tobacco: Never Used   Substance Use Topics     Alcohol use: Yes     Comment: Typically 1-2 per week about 2 drinks     If you drink alcohol do you typically have >3 drinks per day or >7 drinks per week? {ETOH :618336}                     Reviewed orders with patient.  Reviewed health maintenance and updated orders accordingly - {Yes/No:073883::\"Yes\"}  {Chronicprobdata (Optional):074566}    {Mammo Decision Support (Optional):803465}    Pertinent mammograms are reviewed under the imaging tab.  History of abnormal Pap smear: {PAP HX:146588}     Reviewed and updated as needed this visit by clinical staff         Reviewed and updated as needed " "this visit by Provider        {HISTORY OPTIONS (Optional):232357}    ROS:  { :533926}    OBJECTIVE:   There were no vitals taken for this visit.  EXAM:  {Exam Choices:595741}    {Diagnostic Test Results (Optional):743537::\"Diagnostic Test Results:\",\"Labs reviewed in Epic\"}    ASSESSMENT/PLAN:   {Diag Picklist:400998}    COUNSELING:   {FEMALE COUNSELING MESSAGES:822646::\"Reviewed preventive health counseling, as reflected in patient instructions\"}    Estimated body mass index is 29.39 kg/m  as calculated from the following:    Height as of 1/3/20: 1.687 m (5' 6.42\").    Weight as of 1/3/20: 83.6 kg (184 lb 6.4 oz).    {Weight Management Plan (ACO) Complete if BMI is abnormal-  Ages 18-64  BMI >24.9.  Age 65+ with BMI <23 or >30 (Optional):610482}     reports that she has never smoked. She has never used smokeless tobacco.  {Tobacco Cessation -- Complete if patient is a smoker (Optional):537625}    Counseling Resources:  ATP IV Guidelines  Pooled Cohorts Equation Calculator  Breast Cancer Risk Calculator  FRAX Risk Assessment  ICSI Preventive Guidelines  Dietary Guidelines for Americans, 2010  USDA's MyPlate  ASA Prophylaxis  Lung CA Screening    Manisha Celis PA-C  [facility]  "

## 2020-01-28 ENCOUNTER — OFFICE VISIT (OUTPATIENT)
Dept: FAMILY MEDICINE | Facility: CLINIC | Age: 60
End: 2020-01-28
Payer: COMMERCIAL

## 2020-01-28 VITALS
TEMPERATURE: 97.7 F | HEART RATE: 102 BPM | BODY MASS INDEX: 29.25 KG/M2 | OXYGEN SATURATION: 97 % | SYSTOLIC BLOOD PRESSURE: 142 MMHG | DIASTOLIC BLOOD PRESSURE: 98 MMHG | HEIGHT: 66 IN | WEIGHT: 182 LBS

## 2020-01-28 DIAGNOSIS — N64.4 BREAST PAIN: ICD-10-CM

## 2020-01-28 DIAGNOSIS — E78.2 MIXED HYPERLIPIDEMIA: Primary | ICD-10-CM

## 2020-01-28 DIAGNOSIS — Z63.6 CAREGIVER STRESS: ICD-10-CM

## 2020-01-28 DIAGNOSIS — I10 BENIGN ESSENTIAL HYPERTENSION: ICD-10-CM

## 2020-01-28 DIAGNOSIS — Z00.00 ROUTINE GENERAL MEDICAL EXAMINATION AT A HEALTH CARE FACILITY: Primary | ICD-10-CM

## 2020-01-28 DIAGNOSIS — E78.5 HYPERLIPIDEMIA, UNSPECIFIED HYPERLIPIDEMIA TYPE: ICD-10-CM

## 2020-01-28 DIAGNOSIS — K21.9 GASTROESOPHAGEAL REFLUX DISEASE, ESOPHAGITIS PRESENCE NOT SPECIFIED: ICD-10-CM

## 2020-01-28 LAB
ALBUMIN SERPL-MCNC: 3.8 G/DL (ref 3.4–5)
ALP SERPL-CCNC: 87 U/L (ref 40–150)
ALT SERPL W P-5'-P-CCNC: 29 U/L (ref 0–50)
ANION GAP SERPL CALCULATED.3IONS-SCNC: 5 MMOL/L (ref 3–14)
AST SERPL W P-5'-P-CCNC: 16 U/L (ref 0–45)
BILIRUB SERPL-MCNC: 0.6 MG/DL (ref 0.2–1.3)
BUN SERPL-MCNC: 16 MG/DL (ref 7–30)
CALCIUM SERPL-MCNC: 10 MG/DL (ref 8.5–10.1)
CHLORIDE SERPL-SCNC: 104 MMOL/L (ref 94–109)
CHOLEST SERPL-MCNC: 366 MG/DL
CO2 SERPL-SCNC: 28 MMOL/L (ref 20–32)
CREAT SERPL-MCNC: 0.7 MG/DL (ref 0.52–1.04)
CREAT UR-MCNC: 169 MG/DL
GFR SERPL CREATININE-BSD FRML MDRD: >90 ML/MIN/{1.73_M2}
GLUCOSE SERPL-MCNC: 106 MG/DL (ref 70–99)
HDLC SERPL-MCNC: 50 MG/DL
LDLC SERPL CALC-MCNC: ABNORMAL MG/DL
LDLC SERPL DIRECT ASSAY-MCNC: 252 MG/DL
MICROALBUMIN UR-MCNC: 10 MG/L
MICROALBUMIN/CREAT UR: 5.64 MG/G CR (ref 0–25)
NONHDLC SERPL-MCNC: 316 MG/DL
POTASSIUM SERPL-SCNC: 4.3 MMOL/L (ref 3.4–5.3)
PROT SERPL-MCNC: 8.2 G/DL (ref 6.8–8.8)
SODIUM SERPL-SCNC: 137 MMOL/L (ref 133–144)
TRIGL SERPL-MCNC: 510 MG/DL

## 2020-01-28 PROCEDURE — 83721 ASSAY OF BLOOD LIPOPROTEIN: CPT | Mod: 59 | Performed by: NURSE PRACTITIONER

## 2020-01-28 PROCEDURE — 90471 IMMUNIZATION ADMIN: CPT | Performed by: NURSE PRACTITIONER

## 2020-01-28 PROCEDURE — 36415 COLL VENOUS BLD VENIPUNCTURE: CPT | Performed by: NURSE PRACTITIONER

## 2020-01-28 PROCEDURE — 90715 TDAP VACCINE 7 YRS/> IM: CPT | Performed by: NURSE PRACTITIONER

## 2020-01-28 PROCEDURE — 80061 LIPID PANEL: CPT | Performed by: NURSE PRACTITIONER

## 2020-01-28 PROCEDURE — 99213 OFFICE O/P EST LOW 20 MIN: CPT | Mod: 25 | Performed by: NURSE PRACTITIONER

## 2020-01-28 PROCEDURE — 80053 COMPREHEN METABOLIC PANEL: CPT | Performed by: NURSE PRACTITIONER

## 2020-01-28 PROCEDURE — 82043 UR ALBUMIN QUANTITATIVE: CPT | Performed by: NURSE PRACTITIONER

## 2020-01-28 PROCEDURE — 99396 PREV VISIT EST AGE 40-64: CPT | Mod: 25 | Performed by: NURSE PRACTITIONER

## 2020-01-28 RX ORDER — FAMOTIDINE 40 MG/1
40 TABLET, FILM COATED ORAL
Qty: 90 TABLET | Refills: 3 | Status: SHIPPED | OUTPATIENT
Start: 2020-01-28 | End: 2021-03-11

## 2020-01-28 RX ORDER — ATORVASTATIN CALCIUM 10 MG/1
10 TABLET, FILM COATED ORAL DAILY
Qty: 90 TABLET | Refills: 3 | Status: SHIPPED | OUTPATIENT
Start: 2020-01-28 | End: 2021-03-11

## 2020-01-28 RX ORDER — LOSARTAN POTASSIUM 25 MG/1
25 TABLET ORAL DAILY
Qty: 30 TABLET | Refills: 2 | Status: SHIPPED | OUTPATIENT
Start: 2020-01-28 | End: 2021-03-11

## 2020-01-28 RX ORDER — LOSARTAN POTASSIUM 25 MG/1
TABLET ORAL
COMMUNITY
Start: 2019-12-31 | End: 2020-01-28

## 2020-01-28 RX ORDER — CITALOPRAM HYDROBROMIDE 20 MG/1
TABLET ORAL
Qty: 30 TABLET | Refills: 1 | Status: SHIPPED | OUTPATIENT
Start: 2020-01-28 | End: 2021-03-11

## 2020-01-28 SDOH — SOCIAL STABILITY - SOCIAL INSECURITY: DEPENDENT RELATIVE NEEDING CARE AT HOME: Z63.6

## 2020-01-28 ASSESSMENT — MIFFLIN-ST. JEOR: SCORE: 1428.97

## 2020-01-28 NOTE — RESULT ENCOUNTER NOTE
Dear Ophelia,     -LDL(bad) cholesterol level is elevated ( I don't have a result yet for this because they need to separate this out from your other elevated lipid levels) , and your triglycerides are elevated which can increase your heart disease risk.  A diet high in fat and simple carbohydrates, genetics and being overweight can contribute to this. ADVISE: exercising 150 minutes of aerobic exercise per week (30 minutes for 5 days per week or 50 minutes for 3 days per week are options), eating a low saturated fat/low carbohydrate diet, and omega-3 fatty acids (fish oil) 0847-6035 mg daily are helpful to improve this.   I encourage you to start the cholesterol medication (Atorvastatin) and take on a consistent basis.    In 3-6 months, you should recheck your fasting cholesterol panel by scheduling a lab-only appointment.    -Liver and gallbladder tests (ALT,AST, Alk phos,bilirubin) are normal.  -Kidney function (GFR) is normal.  -Sodium is normal.  -Potassium is normal.  -Calcium is normal.   -Glucose is slightly elevated and may be a sign of early diabetes (prediabetes). ADVISE: eating a low carbohydrate diet, exercising, trying to lose weight (if necessary) and rechecking your glucose level in 12 months.      Please send a [employer] message or call [phone]  if you have any questions.      KENNEDY Stock, CNP  [place] [facility] [place]    If you have further questions about the interpretation of your labs, [email or web address] is a good website to check out for further information.

## 2020-01-29 ENCOUNTER — HOSPITAL ENCOUNTER (OUTPATIENT)
Dept: MAMMOGRAPHY | Facility: CLINIC | Age: 60
Discharge: HOME | End: 2020-01-29
Attending: NURSE PRACTITIONER | Admitting: NURSE PRACTITIONER
Payer: COMMERCIAL

## 2020-01-29 ENCOUNTER — HOSPITAL ENCOUNTER (OUTPATIENT)
Dept: MAMMOGRAPHY | Facility: CLINIC | Age: 60
End: 2020-01-29
Attending: NURSE PRACTITIONER
Payer: COMMERCIAL

## 2020-01-29 DIAGNOSIS — N64.4 BREAST PAIN: ICD-10-CM

## 2020-01-29 PROCEDURE — 76642 ULTRASOUND BREAST LIMITED: CPT | Mod: LT

## 2020-01-29 PROCEDURE — G0279 TOMOSYNTHESIS, MAMMO: HCPCS

## 2020-01-29 NOTE — RESULT ENCOUNTER NOTE
Ms. Caal,    -Microalbumin (urine protein) test is normal.  ADVISE: rechecking this annually.    If you have further questions about the interpretation of your labs, [email or web address] is a good website to check out for further information.    Please contact the clinic if you have additional questions.  Thank you.    Sincerely,    London Schulz MD

## 2020-01-29 NOTE — LETTER
Ophelia Caal  [address] [facility] [address]      January 29, 2020  Date of Exam:     Dear Ophelia Caal:    Thank you for your recent visit.  Breast Imaging Result: We are pleased to inform you that the results of your recent breast imaging show no evidence of malignancy (cancer).    If you are experiencing any breast problems such as a lump or localized pain we request that you discuss this with your health care provider if you haven t already done so, as additional testing may be necessary.    As you know, early detection of cancer is very important. Although mammography is the most accurate method for early detection, not all cancers are found through mammography. A thorough examination includes a combination of mammography, physical examination and breast self-examination. Currently the American College of Radiology and the Society of Breast Imaging recommend an annual mammogram for all women beginning at the age of 40.    A report of your breast imaging results was sent to: [facility]    Your breast imaging will become part of your medical file here at [facility] for at least 10 years. You are responsible for informing any new health care provider or breast imaging facility of the date and location of this examination.    We appreciate the opportunity to participate in your health care.    Sincerely,    Papito Booker MD  Interpreting Radiologist  [facility]

## 2020-01-31 NOTE — RESULT ENCOUNTER NOTE
Ms. Caal,    -Mammogram was normal.  ADVISE: rechecking in 1 year.    If you have further questions about the interpretation of your labs, [email or web address] is a good website to check out for further information.    Please contact the clinic if you have additional questions.  Thank you.    Sincerely,    London Schulz MD

## 2020-06-01 ENCOUNTER — VIRTUAL VISIT (OUTPATIENT)
Dept: FAMILY MEDICINE | Facility: OTHER | Age: 60
End: 2020-06-01

## 2020-06-01 DIAGNOSIS — Z20.822 SUSPECTED COVID-19 VIRUS INFECTION: Primary | ICD-10-CM

## 2020-06-01 NOTE — PROGRESS NOTES
"Date: 2020 09:57:15  Clinician: Unruly Olivera  Clinician NPI: [id number]  Patient: Ophelia Caal  Patient : 1960  Patient Address: [address] [place] [address]  Patient Phone: [phone]  Visit Protocol: URI  Patient Summary:  Ophelia is a 59 year old ( : 1960 ) female who initiated a Visit for cold, sinus infection, or influenza. When asked the question \"Please sign me up to receive news, health information and promotions from [employer].\", Ophelia responded \"Yes\".    Ophelia states her symptoms started gradually 10-13 days ago.   Her symptoms consist of ageusia, diarrhea, a sore throat, malaise, anosmia, facial pain or pressure, a cough, nasal congestion, rhinitis, and a headache. She is experiencing mild difficulty breathing with activities but can speak normally in full sentences.   Symptom details     Nasal secretions: The color of her mucus is yellow, blood-tinged, and green.    Cough: Ophelia coughs a few times an hour and her cough is more bothersome at night. Phlegm comes into her throat when she coughs. She believes her cough is caused by post-nasal drip. The color of the phlegm is yellow.     Sore throat: Ophelia reports having mild throat pain (1-3 on a 10 point pain scale), does not have exudate on her tonsils, and can swallow liquids. She is not sure if the lymph nodes in her neck are enlarged. A rash has not appeared on the skin since the sore throat started.     Facial pain or pressure: The facial pain or pressure feels worse when bending over or leaning forward.     Headache: She states the headache is moderate (4-6 on a 10 point pain scale).      Ophelia denies having wheezing, nausea, teeth pain, myalgias, fever, vomiting, ear pain, and chills. She also denies having recent facial or sinus surgery in the past 60 days, double sickening (worsening symptoms after initial improvement), and taking antibiotic medication for the symptoms.   Precipitating events  Ophelia is not " sure if she has been exposed to someone with strep throat. She has not recently been exposed to someone with influenza. Ophelia has been in close contact with the following high risk individuals: people with asthma, heart disease or diabetes.   Pertinent COVID-19 (Coronavirus) information  In the past 14 days, Ophelia has not worked in a congregate living setting.   She does not work or volunteer as healthcare worker or a  and does not work or volunteer in a healthcare facility.   Ophelia also has not lived in a congregate living setting in the past 14 days. She does not live with a healthcare worker.   Ophelia has not had a close contact with a laboratory-confirmed COVID-19 patient within 14 days of symptom onset.   Pertinent medical history  Ophelia had 2 sinus infections within the past year.   Ophelia does not get yeast infections when she takes antibiotics.   Ophelia needs a return to work/school note.   Weight: 180 lbs   Ophelia does not smoke or use smokeless tobacco.   Additional information as reported by the patient (free text): I have allergies and asthma - I get sinus infections often   Weight: 180 lbs    MEDICATIONS: Flonase Allergy Relief nasal, losartan-hydrochlorothiazide oral, Claritin-D 24 Hour oral, ALLERGIES: Sulfa (Sulfonamide Antibiotics), doxycycline  Clinician Response:  Dear Ophelia,  Based on the information provided, you have acute bacterial sinusitis, also known as a sinus infection. Sinus infections are caused by bacteria or a virus and symptoms are almost always identical. The difference between the 2 types of infections is timing.  Sinus infections start as viral infections and symptoms improve on their own in about 7 days. If symptoms have not improved after 7 days or have even worsened, a bacterial infection may have developed.  Medication information  I am prescribing:     Amoxicillin 500 mg oral tablet. Take 1 tablet by mouth every 8 hours for 10 days. There are no refills  with this prescription.   Yeast infections can be a common side effect of antibiotics. The most common symptom of a yeast infection is itchiness in and around the vagina. Other signs and symptoms include burning, redness, or a thick, white vaginal discharge that looks like cottage cheese and does not have a bad smell.  Self care  Steps you can take to be as comfortable as possible:     Rest.    Drink plenty of fluids.    Take a warm shower to loosen congestion    Use a cool-mist humidifier.    Use throat lozenges.    Suck on frozen items such as popsicles.    Drink hot tea with lemon and honey.    Gargle with warm salt water (1/4 teaspoon of salt per 8 ounce glass of water).    Take a spoonful of honey to reduce your cough.     When to seek care  Please be seen in a clinic or urgent care if any of the following occur:     New symptoms develop, or symptoms become worse    Symptoms do not start to improve after 3 days of treatment     Call ahead before going to the clinic or urgent care.  It is possible to have an allergic reaction to an antibiotic even if you have not had one in the past. If you notice a new rash, significant swelling, or difficulty breathing, stop taking this medication immediately and go to a clinic or urgent care.  Call 911 or go to the emergency room if you feel that your throat is closing off, you suddenly develop a rash, you are unable to swallow fluids, you are drooling, or you are having difficulty breathing.  Additional treatment plan      Your symptoms show that you may have coronavirus (COVID-19). This illness can cause fever, cough and trouble breathing. Many people get a mild case and get better on their own. Some people can get very sick.  What should I do?  We would like to test you for this virus. This will be a curbside test done outside the clinic.  Please call [phone] to schedule your visit. Explain that you were referred by [facility] to have a COVID-19 test. Be ready to share  "your [facility] visit ID number.  Starting now:  Stay at least 6 feet away from others. (If someone will drive you to your test, stay in the backseat, as far away from the  as you can.)   Don't go to work, school or anywhere else. When it's time for your test, go straight to the testing site. Don't make any stops on the way there or back.   Wash your hands and face often. Use soap and water.   Cover your mouth and nose with a mask, tissue or washcloth.   Don't touch anyone. No hugging, kissing or handshakes.  While at home   Stay home and away from others (self-isolate) until:  You've had no fever---and no medicine that reduces fever---for 3 full days (72 hours). And...  Your other symptoms have gotten better. For example, your cough or breathing has improved. And...  At least 10 days have passed since your symptoms started.  During this time:  Stay in your own room (and use your own bathroom), if you can.  Don't go to work, school or anywhere else.  Stay away from others in your home. No hugging, kissing or shaking hands.  Don't let anyone visit.  Cover your mouth and nose with a mask, tissue or washcloth to avoid spreading germs.  Clean \"high touch\" surfaces often (doorknobs, counters, handles, etc.). Use a household cleaning spray or wipes.  Wash your hands and face often. Use soap and water.  How can I take care of myself?  1. Get lots of rest. Drink extra fluids (unless your doctor has told you not to).  2. Take Tylenol (acetaminophen) for fever or pain. If you have liver or kidney problems, ask your family doctor if it's okay to take Tylenol.  Adults can take either:   650 mg (two 325 mg pills) every 4 to 6 hours, or...  1,000 mg (two 500 mg pills) every 8 hours as needed.   Note: Don't take more than 3,000 mg in one day.   Acetaminophen is found in many medicines (both prescribed and over-the-counter medicines). Read all labels to be sure you don't take too much.   For children, check the Tylenol bottle for " the right dose. The dose is based on the child's age or weight.  3. If you have other health problems (like cancer, heart failure, an organ transplant or severe kidney disease): Call your specialty clinic if you don't feel better in the next 2 days.  4. Know when to call 911: If your breathing is so bad that it keeps you from doing normal activities, call 911 or go to the emergency room. Tell them that you've been staying home and may have COVID-19.  5. Sign up for [employer]. We know it's scary to hear that you might have COVID-19. We want to track your symptoms to make sure you're okay over the next 2 weeks. Please look for an email from [employer]---this is a free, online program that we'll use to keep in touch. To sign up, follow the link in the email. Learn more at [email or web address].  6. The following will serve as your written order for this Covid Test ordered by me for the indication of suspected Covid [Z20.828]: The test will be ordered in [facility], our electronic health record after you are scheduled and will show as ordered and authorized by Luis F Lofton MD   Order: Covid-19 (Coronavirus) PCR for SYMPTOMATIC testing from [facility]  Where can I get more information?  To learn more about COVID-19 and how to care for yourself at home, please visit the CDC website at https://www.cdc.gov/coronavirus/2019-ncov/about/steps-when-sick.html.  For more about your care at [facility], please visit [email or web address]  If you'd like to be part of a COVID-19 clinical trial (research study) at the [facility], go to [email or web address] for details.    Diagnosis: Acute maxillary sinusitis, unspecified  Diagnosis ICD: J01.00  Prescription: amoxicillin 500 mg oral tablet 30 tablet, 10 days supply. Take 1 tablet by mouth every 8 hours for 10 days. Refills: 0, Refill as needed: no, Allow substitutions: yes  Pharmacy: [facility] -  [phone] - [address], [place] [address]

## 2020-06-02 DIAGNOSIS — Z20.822 SUSPECTED COVID-19 VIRUS INFECTION: Primary | ICD-10-CM

## 2020-06-02 PROCEDURE — 99000 SPECIMEN HANDLING OFFICE-LAB: CPT | Performed by: FAMILY MEDICINE

## 2020-06-02 PROCEDURE — 87635 SARS-COV-2 COVID-19 AMP PRB: CPT | Mod: 90 | Performed by: FAMILY MEDICINE

## 2020-06-02 PROCEDURE — 99207 ZZC NO BILLABLE SERVICE THIS VISIT: CPT

## 2020-06-03 LAB
SARS-COV-2 RNA SPEC QL NAA+PROBE: NOT DETECTED
SPECIMEN SOURCE: NORMAL

## 2021-01-10 ENCOUNTER — HEALTH MAINTENANCE LETTER (OUTPATIENT)
Age: 61
End: 2021-01-10

## 2021-03-11 ENCOUNTER — OFFICE VISIT (OUTPATIENT)
Dept: FAMILY MEDICINE | Facility: CLINIC | Age: 61
End: 2021-03-11
Payer: COMMERCIAL

## 2021-03-11 VITALS
SYSTOLIC BLOOD PRESSURE: 144 MMHG | BODY MASS INDEX: 28.45 KG/M2 | HEART RATE: 90 BPM | OXYGEN SATURATION: 97 % | DIASTOLIC BLOOD PRESSURE: 102 MMHG | WEIGHT: 177 LBS | TEMPERATURE: 96.5 F | HEIGHT: 66 IN | RESPIRATION RATE: 12 BRPM

## 2021-03-11 DIAGNOSIS — R68.89 CHANGE IN WEIGHT: ICD-10-CM

## 2021-03-11 DIAGNOSIS — R10.11 RUQ ABDOMINAL PAIN: ICD-10-CM

## 2021-03-11 DIAGNOSIS — Z12.11 SCREEN FOR COLON CANCER: ICD-10-CM

## 2021-03-11 DIAGNOSIS — Z00.00 ROUTINE GENERAL MEDICAL EXAMINATION AT A HEALTH CARE FACILITY: Primary | ICD-10-CM

## 2021-03-11 DIAGNOSIS — Z12.31 VISIT FOR SCREENING MAMMOGRAM: ICD-10-CM

## 2021-03-11 DIAGNOSIS — E78.5 HYPERLIPIDEMIA, UNSPECIFIED HYPERLIPIDEMIA TYPE: ICD-10-CM

## 2021-03-11 DIAGNOSIS — M25.532 LEFT WRIST PAIN: ICD-10-CM

## 2021-03-11 DIAGNOSIS — E21.3 HYPERPARATHYROIDISM (H): ICD-10-CM

## 2021-03-11 DIAGNOSIS — I10 BENIGN ESSENTIAL HYPERTENSION: ICD-10-CM

## 2021-03-11 PROCEDURE — 99396 PREV VISIT EST AGE 40-64: CPT | Performed by: NURSE PRACTITIONER

## 2021-03-11 PROCEDURE — 80061 LIPID PANEL: CPT | Performed by: NURSE PRACTITIONER

## 2021-03-11 PROCEDURE — 99213 OFFICE O/P EST LOW 20 MIN: CPT | Mod: 25 | Performed by: NURSE PRACTITIONER

## 2021-03-11 PROCEDURE — 36415 COLL VENOUS BLD VENIPUNCTURE: CPT | Performed by: NURSE PRACTITIONER

## 2021-03-11 PROCEDURE — 80053 COMPREHEN METABOLIC PANEL: CPT | Performed by: NURSE PRACTITIONER

## 2021-03-11 PROCEDURE — 77063 BREAST TOMOSYNTHESIS BI: CPT | Mod: TC | Performed by: RADIOLOGY

## 2021-03-11 PROCEDURE — 77067 SCR MAMMO BI INCL CAD: CPT | Mod: TC | Performed by: RADIOLOGY

## 2021-03-11 PROCEDURE — 82306 VITAMIN D 25 HYDROXY: CPT | Performed by: NURSE PRACTITIONER

## 2021-03-11 PROCEDURE — 84443 ASSAY THYROID STIM HORMONE: CPT | Performed by: NURSE PRACTITIONER

## 2021-03-11 RX ORDER — DUPILUMAB 300 MG/2ML
INJECTION, SOLUTION SUBCUTANEOUS
COMMUNITY
Start: 2021-02-25

## 2021-03-11 RX ORDER — LOSARTAN POTASSIUM 25 MG/1
25 TABLET ORAL DAILY
Qty: 90 TABLET | Refills: 3 | Status: SHIPPED | OUTPATIENT
Start: 2021-03-11 | End: 2021-04-13

## 2021-03-11 RX ORDER — ATORVASTATIN CALCIUM 20 MG/1
20 TABLET, FILM COATED ORAL DAILY
Qty: 90 TABLET | Refills: 3 | Status: SHIPPED | OUTPATIENT
Start: 2021-03-11 | End: 2022-05-11

## 2021-03-11 ASSESSMENT — ASTHMA QUESTIONNAIRES
QUESTION_4 LAST FOUR WEEKS HOW OFTEN HAVE YOU USED YOUR RESCUE INHALER OR NEBULIZER MEDICATION (SUCH AS ALBUTEROL): NOT AT ALL
QUESTION_5 LAST FOUR WEEKS HOW WOULD YOU RATE YOUR ASTHMA CONTROL: COMPLETELY CONTROLLED
QUESTION_2 LAST FOUR WEEKS HOW OFTEN HAVE YOU HAD SHORTNESS OF BREATH: ONCE OR TWICE A WEEK
ACT_TOTALSCORE: 24
QUESTION_1 LAST FOUR WEEKS HOW MUCH OF THE TIME DID YOUR ASTHMA KEEP YOU FROM GETTING AS MUCH DONE AT WORK, SCHOOL OR AT HOME: NONE OF THE TIME
QUESTION_3 LAST FOUR WEEKS HOW OFTEN DID YOUR ASTHMA SYMPTOMS (WHEEZING, COUGHING, SHORTNESS OF BREATH, CHEST TIGHTNESS OR PAIN) WAKE YOU UP AT NIGHT OR EARLIER THAN USUAL IN THE MORNING: NOT AT ALL

## 2021-03-11 ASSESSMENT — MIFFLIN-ST. JEOR: SCORE: 1401.29

## 2021-03-11 ASSESSMENT — ANXIETY QUESTIONNAIRES
GAD7 TOTAL SCORE: 8
3. WORRYING TOO MUCH ABOUT DIFFERENT THINGS: SEVERAL DAYS
7. FEELING AFRAID AS IF SOMETHING AWFUL MIGHT HAPPEN: SEVERAL DAYS
1. FEELING NERVOUS, ANXIOUS, OR ON EDGE: MORE THAN HALF THE DAYS
6. BECOMING EASILY ANNOYED OR IRRITABLE: SEVERAL DAYS
2. NOT BEING ABLE TO STOP OR CONTROL WORRYING: NOT AT ALL
5. BEING SO RESTLESS THAT IT IS HARD TO SIT STILL: SEVERAL DAYS
IF YOU CHECKED OFF ANY PROBLEMS ON THIS QUESTIONNAIRE, HOW DIFFICULT HAVE THESE PROBLEMS MADE IT FOR YOU TO DO YOUR WORK, TAKE CARE OF THINGS AT HOME, OR GET ALONG WITH OTHER PEOPLE: SOMEWHAT DIFFICULT

## 2021-03-11 ASSESSMENT — PAIN SCALES - GENERAL: PAINLEVEL: NO PAIN (0)

## 2021-03-11 ASSESSMENT — PATIENT HEALTH QUESTIONNAIRE - PHQ9
SUM OF ALL RESPONSES TO PHQ QUESTIONS 1-9: 3
5. POOR APPETITE OR OVEREATING: MORE THAN HALF THE DAYS

## 2021-03-11 NOTE — PATIENT INSTRUCTIONS

## 2021-03-11 NOTE — PROGRESS NOTES
SUBJECTIVE:   CC: Ophelia Caal is an 60 year old woman who presents for preventive health visit.     Patient has been advised of split billing requirements and indicates understanding: Yes     Patient states that she has been having intermittent pain in her right upper quadrant. Area is tender when she presses down on the area and has only noticed a few times in the last couple of weeks. She also reports that in the last year she has occasionally noticed a lump in that area that seems to be mobile, and goes away with stretching. Last time she noticed this lump was 2-3 weeks ago.      She has also been having some problems with her left wrist and thumb that she thinks might be related to carpal tunnel. She describes the pain as a tightness feeling and reports decreased strength when gripping items. Occasional numbness and weakness. Symptoms are gradually getting worse. Bones feel like they are more pronounced. She is currently working from home and does type on the computer quite frequently. Family hx of arthritis.     Healthy Habits:  Answers for HPI/ROS submitted by the patient on 3/11/2021   Annual Exam:  Frequency of exercise:: 4-5 days/week  Getting at least 3 servings of Calcium per day:: NO - takes a multivitamin   Diet:: Regular (no restrictions)  Taking medications regularly:: Yes  Medication side effects:: None   Bi-annual eye exam:: Yes  Dental care twice a year:: Yes  Sleep apnea or symptoms of sleep apnea:: None  Additional concerns today:: Yes  Duration of exercise:: 30-45 minutes (started a rowing class, 2x per week and enjoying)     Anxiety Follow-Up      How are you doing with your anxiety since your last visit? Improved stopped citalopram - has been months     Are you having other symptoms that might be associated with anxiety? No    Have you had a significant life event? Found out she has a half brother- found out through [employer]- lives in [place].  Also got a dog, has been working from  home, not seeing family  Due to pandemic.     Are you feeling depressed? No    Do you have any concerns with your use of alcohol or other drugs? No     Patient states that her anxiety feels okay at the time. The past 2 weeks have been tougher for her as her mom was in the hospital recently and her dad has Alzheimer. Her mom is now at home and doing better. Her daughter is due to have 2nd baby any day now and this is causing her a lot of anxiety as she cannot be there due to COVID restrictions. She feels like her anxiety is highly situational at the moment and wants to wait before re-starting her previous medication (Celexa 20 mg). She reports occasional palpitations when she gets anxious but they go away in a few seconds. MALIK-7 is 8 today, significantly up from 2018 score of 4.     Today's PHQ-2 Score:   PHQ-2 ( 1999 [employer]) 3/11/2021 1/3/2020   Q1: Little interest or pleasure in doing things 0 1   Q2: Feeling down, depressed or hopeless 0 1   PHQ-2 Score 0 2   Q1: Little interest or pleasure in doing things Not at all Several days   Q2: Feeling down, depressed or hopeless Not at all Several days   PHQ-2 Score 0 2     MALIK-7 SCORE 1/9/2018 3/11/2021   Total Score 4 8     PHQ 3/11/2021   PHQ-9 Total Score 3   Q9: Thoughts of better off dead/self-harm past 2 weeks Not at all       Asthma Follow-Up    Was ACT completed today?    Yes    ACT Total Scores 3/11/2021   ACT TOTAL SCORE (Goal Greater than or Equal to 20) 24   In the past 12 months, how many times did you visit the emergency room for your asthma without being admitted to the hospital? 0   In the past 12 months, how many times were you hospitalized overnight because of your asthma? 0          How many days per week do you miss taking your asthma controller medication?  0    Please describe any recent triggers for your asthma: None    Have you had any Emergency Room Visits, Urgent Care Visits, or Hospital Admissions since your last office visit?  No     Patient  is now seeing an allergist. Hx sinus polyps. Started Dupixent. One injection every other week. Has not needed albuterol inhaler since she started this medication. Polyps have improved as well.        Hyperlipidemia Follow-Up      Are you regularly taking any medication or supplement to lower your cholesterol?   No has been out for a few months     Are you having muscle aches or other side effects that you think could be caused by your cholesterol lowering medication?  No     Recent Labs   Lab Test 01/28/20  0904 10/02/18  0752   CHOL 366* 319*   HDL 50 59   LDL Cannot estimate LDL when triglyceride exceeds 400 mg/dL  252* 215*   TRIG 510* 225*     Patient has been out of medication for a couple months. Felt like she has been doing okay and has been more focused on other life events lately. Family hx of high cholesterol. She was previously taking Atorvastatin 10 mg. No significant side effects associated with the medication. She states she is open to re-starting this.     Hypertension Follow-up - has been out of losartan for a few months       Do you check your blood pressure regularly outside of the clinic? Yes     Are you following a low salt diet? No    Are your blood pressures ever more than 140 on the top number (systolic) OR more   than 90 on the bottom number (diastolic), for example 140/90? Yes     She ran out of her Losartan a couple of months ago. BP running 140s/90s when she checks at home (checks every couple days). No significant side effects when taking Losartan 25 mg.     Hyperparathyroidism history  Last seen by Dr. Best in October 2018.    PTH previously improving, Vit D low normal, on hydrochlorothiazide at that time.    Due for labs.      Abuse: Current or Past(Physical, Sexual or Emotional)- No  Do you feel safe in your environment? 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  Reviewed health maintenance and updated orders accordingly - Yes  Lab work is in process    Breast CA Risk Screening:  Breast CA Risk Assessment (FHS-7) 3/11/2021   Do you have a family history of breast, colon, or ovarian cancer? Yes   Did any of your first-degree relatives have breast or ovarian cancer? No   Did any of your relatives have bilateral breast cancer? No   Did any man in your family have breast cancer? No   Did any woman in your family have breast and ovarian cancer? No   Did any woman in your family have breast cancer before age 50 y? No   Do you have 2 or more relatives with breast and/or ovarian cancer? Yes   Do you have 2 or more relatives with breast and/or bowel cancer? No     Mammogram Screening: Recommended mammography every 1-2 years with patient discussion and risk factor consideration  Pertinent mammograms are reviewed under the imaging tab.    Pertinent mammograms are reviewed  "under the imaging tab.  History of abnormal Pap smear: Status post benign hysterectomy. Health Maintenance and Surgical History updated.     Reviewed and updated as needed this visit by clinical staff  Tobacco  Allergies  Meds              Reviewed and updated as needed this visit by Provider    ROS:  CONSTITUTIONAL: Positive for changes in weight; NEGATIVE for fever, chills,   INTEGUMENTARY/SKIN: NEGATIVE for worrisome rashes, moles or lesions  EYES: Positive for dry eyes; using drops; NEGATIVE for vision changes or irritation  ENT: Positive for nasal congestion; NEGATIVE for ear, mouth and throat problems  RESP: NEGATIVE for significant cough or SOB  BREAST: Positive for left breast tenderness - musculoskeletal (previous negative diagnostic mammogram/ultrasound; NEGATIVE for masses, tenderness or discharge  CV: Positive for intermittent palpitations with anxiety; NEGATIVE for chest pain or peripheral edema  GI: NEGATIVE for nausea, abdominal pain, heartburn, or change in bowel habits  : NEGATIVE for unusual urinary or vaginal symptoms. No vaginal bleeding.  MUSCULOSKELETAL: NEGATIVE for significant arthralgias or myalgia  NEURO: NEGATIVE for weakness, dizziness or paresthesias  PSYCHIATRIC: NEGATIVE for changes in mood or affect     OBJECTIVE:   BP (!) 144/102   Pulse 90   Temp 96.5  F (35.8  C)   Resp 12   Ht 1.687 m (5' 6.42\")   Wt 80.3 kg (177 lb)   SpO2 97%   BMI 28.21 kg/m    EXAM:  GENERAL APPEARANCE: healthy, alert and no distress  EYES: Eyes grossly normal to inspection, PERRL and conjunctivae and sclerae normal  HENT: ear canals and TM's normal, nose and mouth without ulcers or lesions, oropharynx clear and oral mucous membranes moist  NECK: no adenopathy, no asymmetry, masses, or scars  RESP: lungs clear to auscultation - no rales, rhonchi or wheezes  CV: regular rate and rhythm, normal S1 S2, no S3 or S4, no murmur, click or rub, no peripheral edema  ABDOMEN: soft, bowel sounds normal, " tenderness upon palpation of RUQ, mid-upper epigastric area   MS: no musculoskeletal defects are noted and gait is age appropriate without ataxia  SKIN: no suspicious lesions or rashes  NEURO: Normal strength and tone, sensory exam grossly normal, mentation intact and speech normal  PSYCH: mentation appears normal and affect normal/bright    No results found for this or any previous visit (from the past 24 hour(s)).    ASSESSMENT/PLAN:   1. Routine general medical examination at a health care facility  - Mammogram: patient is scheduled to have mammogram today 3/11/21  - Colonoscopy: order placed for patient to have completed this year. Last 2012.  - Discussed importance of diet and exercise     2. Hyperlipidemia, unspecified hyperlipidemia type  Restarted patient on Atorvastatin, increase dose from 10 to 20 mg due to recent elevated lipid panel values. Labs to be done today.   - atorvastatin (LIPITOR) 20 MG tablet; Take 1 tablet (20 mg) by mouth daily  Dispense: 90 tablet; Refill: 3  - Lipid panel reflex to direct LDL Fasting    3. Benign essential hypertension  Restarted on Losartan 25 mg. Educated patient on potential side effects to watch out for and importance of checking BP regularly at home. RN appointment scheduled in 4 weeks to recheck blood pressure.   - Albumin Random Urine Quantitative with Creat Ratio  - losartan (COZAAR) 25 MG tablet; Take 1 tablet (25 mg) by mouth daily  Dispense: 90 tablet; Refill: 3  - Comprehensive metabolic panel (BMP + Alb, Alk Phos, ALT, AST, Total. Bili, TP)    4. Change in weight  Patient reported slight weight fluctuations in the past 6-12 months consistent with increased diet and exercise   - TSH with free T4 reflex    5. Left wrist pain  Referral to neurology to obtain EMG of the left wrist for possible carpal tunnel  - NEUROLOGY ADULT REFERRAL    6. RUQ abdominal pain  Differentials to be considered include musculoskeletal versus gallbladder etiology due to location.  "Discussed with patient that we will contact her with results and may need to obtain CT abdomen and pelvis in the future depending on results.   - US Abdomen Limited; Future    7. Screen for colon cancer  Last colonoscopy 2012 - was told to follow up in 10 years, patient wishes to proceed with screening colonoscopy this year.    - GASTROENTEROLOGY ADULT REF PROCEDURE ONLY; Future    8.  Hyperparathyroidism history  Last seen by Dr. Best in October 2018.    Due for labs.      COUNSELING:   Reviewed preventive health counseling, as reflected in patient instructions    Estimated body mass index is 28.21 kg/m  as calculated from the following:    Height as of this encounter: 1.687 m (5' 6.42\").    Weight as of this encounter: 80.3 kg (177 lb).    Weight management plan: Discussed healthy diet and exercise guidelines    She reports that she has never smoked. She has never used smokeless tobacco.      Counseling Resources:  ATP IV Guidelines  Pooled Cohorts Equation Calculator  Breast Cancer Risk Calculator  BRCA-Related Cancer Risk Assessment: FHS-7 Tool  FRAX Risk Assessment  ICSI Preventive Guidelines  Dietary Guidelines for Americans, 2010  USDA's MyPlate  ASA Prophylaxis  Lung CA Screening    LARISA Clifton, RN, FNP DNP student     This patient was seen alongside a student nurse practitioner.  The history, reviews of systems, objective data, and assessment/plan were completed by myself.      Syeda Mars, [facility] [place]  "

## 2021-03-12 LAB
ALBUMIN SERPL-MCNC: 4.4 G/DL (ref 3.4–5)
ALP SERPL-CCNC: 92 U/L (ref 40–150)
ALT SERPL W P-5'-P-CCNC: 35 U/L (ref 0–50)
ANION GAP SERPL CALCULATED.3IONS-SCNC: 9 MMOL/L (ref 3–14)
AST SERPL W P-5'-P-CCNC: 21 U/L (ref 0–45)
BILIRUB SERPL-MCNC: 0.6 MG/DL (ref 0.2–1.3)
BUN SERPL-MCNC: 17 MG/DL (ref 7–30)
CALCIUM SERPL-MCNC: 10.1 MG/DL (ref 8.5–10.1)
CHLORIDE SERPL-SCNC: 103 MMOL/L (ref 94–109)
CHOLEST SERPL-MCNC: 359 MG/DL
CO2 SERPL-SCNC: 25 MMOL/L (ref 20–32)
CREAT SERPL-MCNC: 0.82 MG/DL (ref 0.52–1.04)
GFR SERPL CREATININE-BSD FRML MDRD: 78 ML/MIN/{1.73_M2}
GLUCOSE SERPL-MCNC: 107 MG/DL (ref 70–99)
HDLC SERPL-MCNC: 64 MG/DL
LDLC SERPL CALC-MCNC: 232 MG/DL
NONHDLC SERPL-MCNC: 295 MG/DL
POTASSIUM SERPL-SCNC: 4.1 MMOL/L (ref 3.4–5.3)
PROT SERPL-MCNC: 8.9 G/DL (ref 6.8–8.8)
SODIUM SERPL-SCNC: 137 MMOL/L (ref 133–144)
TRIGL SERPL-MCNC: 316 MG/DL
TSH SERPL DL<=0.005 MIU/L-ACNC: 3.87 MU/L (ref 0.4–4)

## 2021-03-12 ASSESSMENT — ANXIETY QUESTIONNAIRES: GAD7 TOTAL SCORE: 8

## 2021-03-12 ASSESSMENT — ASTHMA QUESTIONNAIRES: ACT_TOTALSCORE: 24

## 2021-03-15 LAB — DEPRECATED CALCIDIOL+CALCIFEROL SERPL-MC: 47 UG/L (ref 20–75)

## 2021-03-16 NOTE — RESULT ENCOUNTER NOTE
Dear Ophelia,     -LDL(bad) cholesterol level is very elevated, and your triglycerides are elevated which can increase your heart disease risk.  Let's plan to check a fasting lipid panel again in 3 months (since we adjusted your dose of Atorvastatin) and if your levels are still elevated I would recommend another dose adjustment at that time.       In 3 months, you should recheck your fasting cholesterol panel by scheduling a lab-only appointment.    -Liver and gallbladder tests (ALT,AST, Alk phos,bilirubin) are normal.  -Kidney function (GFR) is normal.  -Sodium is normal.  -Potassium is normal.  -Calcium is normal.  -Glucose is slight elevated and may be a sign of early diabetes (prediabetes). ADVISE: eating a low carbohydrate diet, exercising, trying to lose weight (if necessary) and rechecking your glucose level in 12 months.  -TSH (thyroid stimulating hormone) level is normal which indicates normal thyroid function.  -Vitamin D level is normal and getting 6292-6725 IU daily in your diet or supplements is recommended.       Please send a [employer] message or call [phone]  if you have any questions.      Syeda Mars, APRN, CNP  [facility]    If you have further questions about the interpretation of your labs, [email or web address] is a good website to check out for further information.

## 2021-03-22 ENCOUNTER — HOSPITAL ENCOUNTER (OUTPATIENT)
Dept: ULTRASOUND IMAGING | Facility: CLINIC | Age: 61
Discharge: HOME | End: 2021-03-22
Attending: NURSE PRACTITIONER | Admitting: NURSE PRACTITIONER
Payer: COMMERCIAL

## 2021-03-22 DIAGNOSIS — R10.11 RUQ ABDOMINAL PAIN: ICD-10-CM

## 2021-03-22 PROCEDURE — 76705 ECHO EXAM OF ABDOMEN: CPT

## 2021-03-23 NOTE — RESULT ENCOUNTER NOTE
Dear Ophelia,     Your recent abdominal ultrasound was reassuring.  There was no evidence of gallbladder stones (cholelithiasis) or acute gallbladder issues (cholecystitis).    There was mention of a mild hepatic steatosis (fatty liver), but your liver function tests (ALT and AST) were normal.  This can be caused by carrying abdominal weight.        Please send a [employer] message or call [phone]  if you have any questions.      ySeda Mars, KENNEDY, CNP  [facility]    If you have further questions about the interpretation of your labs, [email or web address] is a good website to check out for further information.

## 2021-04-13 ENCOUNTER — ALLIED HEALTH/NURSE VISIT (OUTPATIENT)
Dept: NURSING | Facility: CLINIC | Age: 61
End: 2021-04-13
Payer: COMMERCIAL

## 2021-04-13 VITALS
OXYGEN SATURATION: 100 % | HEART RATE: 73 BPM | SYSTOLIC BLOOD PRESSURE: 150 MMHG | DIASTOLIC BLOOD PRESSURE: 100 MMHG | RESPIRATION RATE: 18 BRPM

## 2021-04-13 DIAGNOSIS — I10 BENIGN ESSENTIAL HYPERTENSION: Primary | ICD-10-CM

## 2021-04-13 DIAGNOSIS — J30.2 SEASONAL ALLERGIC RHINITIS: ICD-10-CM

## 2021-04-13 RX ORDER — LOSARTAN POTASSIUM 50 MG/1
50 TABLET ORAL DAILY
Qty: 90 TABLET | Refills: 3 | Status: SHIPPED | OUTPATIENT
Start: 2021-04-13 | End: 2022-05-11

## 2021-04-13 RX ORDER — LORATADINE 10 MG/1
10 TABLET ORAL DAILY
COMMUNITY
Start: 2021-04-13

## 2021-04-13 ASSESSMENT — PAIN SCALES - GENERAL: PAINLEVEL: NO PAIN (0)

## 2021-04-13 NOTE — PROGRESS NOTES
Recommend increasing Losartan to 50 mg daily and follow-up for a RN blood pressure check in 1 month after adjusting medication.  New prescription sent to patient's pharmacy.  Please notify patient and assist with scheduling follow-up appointment.  - Tiana, CNP

## 2021-04-14 ENCOUNTER — TELEPHONE (OUTPATIENT)
Dept: FAMILY MEDICINE | Facility: CLINIC | Age: 61
End: 2021-04-14

## 2021-04-14 ENCOUNTER — NURSE TRIAGE (OUTPATIENT)
Dept: NURSING | Facility: CLINIC | Age: 61
End: 2021-04-14

## 2021-04-14 NOTE — TELEPHONE ENCOUNTER
"Patient is returning a call from the clinic. Reviewed the message as shown below which was left by the clinic:    \"Per Syeda Mars:   \"Recommend increasing Losartan to 50 mg daily and follow-up for a RN blood pressure check in 1 month after adjusting medication.  New prescription sent to patient's pharmacy.  Please notify patient and assist with scheduling follow-up appointment.  - ADELFOeixl, CNP\"    Patient verbalized understanding. Patient states she will call back at a later time to schedule the appointment for blood pressure recheck.     Manisha Comer, RN/[facility]      Additional Information    Negative: [1] Caller is not with the adult (patient) AND [2] reporting urgent symptoms    Negative: Lab result questions    Negative: Medication questions    Negative: Caller can't be reached by phone    Negative: Caller has already spoken to PCP or another triager    Negative: RN needs further essential information from caller in order to complete triage    Negative: Requesting regular office appointment    Negative: [1] Caller requesting NON-URGENT health information AND [2] PCP's office is the best resource    Negative: Health Information question, no triage required and triager able to answer question    Negative: General information question, no triage required and triager able to answer question    Negative: Question about upcoming scheduled test, no triage required and triager able to answer question    Negative: [1] Caller is not with the adult (patient) AND [2] probable NON-URGENT symptoms    [1] Follow-up call to recent contact AND [2] information only call, no triage required    Protocols used: INFORMATION ONLY CALL-A-AH      "

## 2021-04-14 NOTE — TELEPHONE ENCOUNTER
"Attempt # 1    Called # [phone]     Left a non detailed VM to call back at [phone] and ask for any available Triage Nurse.    Per Syeda Mars:   \"Recommend increasing Losartan to 50 mg daily and follow-up for a RN blood pressure check in 1 month after adjusting medication.  New prescription sent to patient's pharmacy.  Please notify patient and assist with scheduling follow-up appointment.  - Tiana, CNP\"    Alejandra Swanson RN  [facility]    "

## 2021-04-14 NOTE — TELEPHONE ENCOUNTER
"Patient is returning a call from the clinic. Reviewed the message as shown below which was left by the clinic:     \"Per Syeda Mars:   \"Recommend increasing Losartan to 50 mg daily and follow-up for a RN blood pressure check in 1 month after adjusting medication.  New prescription sent to patient's pharmacy.  Please notify patient and assist with scheduling follow-up appointment.  - Tiana, CNP\"     Patient verbalized understanding. Patient states she will call back at a later time to schedule the appointment for blood pressure recheck.      Manisha Comer, RN/[facility]  "

## 2021-04-20 DIAGNOSIS — Z11.59 ENCOUNTER FOR SCREENING FOR OTHER VIRAL DISEASES: ICD-10-CM

## 2021-04-27 DIAGNOSIS — Z11.59 ENCOUNTER FOR SCREENING FOR OTHER VIRAL DISEASES: ICD-10-CM

## 2021-04-27 LAB
SARS-COV-2 RNA RESP QL NAA+PROBE: NORMAL
SPECIMEN SOURCE: NORMAL

## 2021-04-27 PROCEDURE — U0003 INFECTIOUS AGENT DETECTION BY NUCLEIC ACID (DNA OR RNA); SEVERE ACUTE RESPIRATORY SYNDROME CORONAVIRUS 2 (SARS-COV-2) (CORONAVIRUS DISEASE [COVID-19]), AMPLIFIED PROBE TECHNIQUE, MAKING USE OF HIGH THROUGHPUT TECHNOLOGIES AS DESCRIBED BY CMS-2020-01-R: HCPCS | Performed by: INTERNAL MEDICINE

## 2021-04-27 PROCEDURE — U0005 INFEC AGEN DETEC AMPLI PROBE: HCPCS | Performed by: INTERNAL MEDICINE

## 2021-04-28 ENCOUNTER — TRANSFERRED RECORDS (OUTPATIENT)
Dept: HEALTH INFORMATION MANAGEMENT | Facility: CLINIC | Age: 61
End: 2021-04-28

## 2021-04-28 LAB
LABORATORY COMMENT REPORT: NORMAL
SARS-COV-2 RNA RESP QL NAA+PROBE: NEGATIVE
SPECIMEN SOURCE: NORMAL

## 2021-04-30 ENCOUNTER — HOSPITAL ENCOUNTER (OUTPATIENT)
Facility: CLINIC | Age: 61
Discharge: HOME | End: 2021-04-30
Attending: INTERNAL MEDICINE | Admitting: INTERNAL MEDICINE
Payer: COMMERCIAL

## 2021-04-30 VITALS
HEART RATE: 78 BPM | WEIGHT: 177 LBS | BODY MASS INDEX: 27.78 KG/M2 | RESPIRATION RATE: 12 BRPM | SYSTOLIC BLOOD PRESSURE: 136 MMHG | OXYGEN SATURATION: 92 % | DIASTOLIC BLOOD PRESSURE: 84 MMHG | HEIGHT: 67 IN

## 2021-04-30 LAB — COLONOSCOPY: NORMAL

## 2021-04-30 PROCEDURE — G0500 MOD SEDAT ENDO SERVICE >5YRS: HCPCS | Performed by: INTERNAL MEDICINE

## 2021-04-30 PROCEDURE — 88305 TISSUE EXAM BY PATHOLOGIST: CPT | Mod: 26 | Performed by: PATHOLOGY

## 2021-04-30 PROCEDURE — 45385 COLONOSCOPY W/LESION REMOVAL: CPT | Mod: PT | Performed by: INTERNAL MEDICINE

## 2021-04-30 PROCEDURE — 88305 TISSUE EXAM BY PATHOLOGIST: CPT | Mod: TC | Performed by: INTERNAL MEDICINE

## 2021-04-30 PROCEDURE — 250N000011 HC RX IP 250 OP 636: Performed by: INTERNAL MEDICINE

## 2021-04-30 RX ORDER — LIDOCAINE 40 MG/G
CREAM TOPICAL
Status: DISCONTINUED | OUTPATIENT
Start: 2021-04-30 | End: 2021-04-30 | Stop reason: HOSPADM

## 2021-04-30 RX ORDER — ONDANSETRON 2 MG/ML
4 INJECTION INTRAMUSCULAR; INTRAVENOUS
Status: DISCONTINUED | OUTPATIENT
Start: 2021-04-30 | End: 2021-04-30 | Stop reason: HOSPADM

## 2021-04-30 RX ORDER — FLUMAZENIL 0.1 MG/ML
0.2 INJECTION, SOLUTION INTRAVENOUS
Status: CANCELLED | OUTPATIENT
Start: 2021-04-30 | End: 2021-04-30

## 2021-04-30 RX ORDER — PROCHLORPERAZINE MALEATE 10 MG
10 TABLET ORAL EVERY 6 HOURS PRN
Status: CANCELLED | OUTPATIENT
Start: 2021-04-30

## 2021-04-30 RX ORDER — ONDANSETRON 4 MG/1
4 TABLET, ORALLY DISINTEGRATING ORAL EVERY 6 HOURS PRN
Status: CANCELLED | OUTPATIENT
Start: 2021-04-30

## 2021-04-30 RX ORDER — NALOXONE HYDROCHLORIDE 0.4 MG/ML
0.2 INJECTION, SOLUTION INTRAMUSCULAR; INTRAVENOUS; SUBCUTANEOUS
Status: CANCELLED | OUTPATIENT
Start: 2021-04-30

## 2021-04-30 RX ORDER — NALOXONE HYDROCHLORIDE 0.4 MG/ML
0.4 INJECTION, SOLUTION INTRAMUSCULAR; INTRAVENOUS; SUBCUTANEOUS
Status: CANCELLED | OUTPATIENT
Start: 2021-04-30

## 2021-04-30 RX ORDER — ONDANSETRON 2 MG/ML
4 INJECTION INTRAMUSCULAR; INTRAVENOUS EVERY 6 HOURS PRN
Status: CANCELLED | OUTPATIENT
Start: 2021-04-30

## 2021-04-30 RX ADMIN — MIDAZOLAM 1 MG: 1 INJECTION INTRAMUSCULAR; INTRAVENOUS at 08:05

## 2021-04-30 RX ADMIN — MIDAZOLAM 2 MG: 1 INJECTION INTRAMUSCULAR; INTRAVENOUS at 08:00

## 2021-04-30 RX ADMIN — MIDAZOLAM 1 MG: 1 INJECTION INTRAMUSCULAR; INTRAVENOUS at 08:09

## 2021-04-30 ASSESSMENT — MIFFLIN-ST. JEOR: SCORE: 1402.56

## 2021-04-30 NOTE — DISCHARGE INSTRUCTIONS
Understanding Colon and Rectal Polyps     The colon has a smooth lining composed of millions of cells.     The colon (also called the large intestine) is a muscular tube that forms the last part of the digestive tract. It absorbs water and stores food waste. The colon is about 4 to 6 feet long. The rectum is the last 6 inches of the colon. The colon and rectum have a smooth lining composed of millions of cells. Changes in these cells can lead to growths in the colon that can become cancerous and should be removed.     When the Colon Lining Changes  Changes that occur in the cells that line the colon or rectum can lead to growths called polyps. Over a period of years, polyps can turn cancerous. Removing polyps early may prevent cancer from ever forming.      Polyps  Polyps are fleshy clumps of tissue that form on the lining of the colon or rectum. Small polyps are usually benign (not cancerous). However, over time, cells in a polyp can change and become cancerous. The larger a polyp grows, the more likely this is to happen. Also, certain types of polyps known as adenomatous polyps are considered premalignant. This means that they will almost always become cancerous if they re not removed.          Cancer  Almost all colorectal cancers start when polyp cells begin growing abnormally. As a cancerous tumor grows, it may involve more and more of the colon or rectum. In time, cancer can also grow beyond the colon or rectum and spread to nearby organs or to glands called lymph nodes. The cells can also travel to other parts of the body. This is known as metastasis. The earlier a cancerous tumor is removed, the better the chance of preventing its spread.        [id number] [facility], [address] [place] [address], [place] [address]. All rights reserved. This information is not intended as a substitute for professional medical care. Always follow your healthcare professional's instructions.      Understanding Diverticulosis  and Diverticulitis     Pouches or diverticula usually occur in the lower part of the colon called the sigmoid.      Diverticulitis occurs when the pouches become inflamed.     The colon (large intestine) is the last part of the digestive tract. It absorbs water from stool and changes it from a liquid to a solid. In certain cases, small pouches called diverticula can form in the colon wall. This condition is called diverticulosis. The pouches can become infected. If this happens, it becomes a more serious problem called diverticulitis. These problems can be painful. But they can be managed.   Managing Your Condition  Diet changes or taking medications are often tried first. These may be enough to bring relief. If the case is bad, surgery may be done. You and your doctor can discuss the plan that is best for you.  If You Have Diverticulosis  Diet changes are often enough to control symptoms. The main changes are adding fiber (roughage) and drinking more water. Fiber absorbs water as it travels through your colon. This helps your stool stay soft and move smoothly. Water helps this process. If needed, you may be told to take over-the-counter stool softeners. To help relieve pain, antispasmodic medications may be prescribed.  If You Have Diverticulitis  Treatment depends on how bad your symptoms are.  For mild symptoms: You may be put on a liquid diet for a short time. You may also be prescribed antibiotics. If these two steps relieve your symptoms, you may then be prescribed a high-fiber diet. If you still have symptoms, your doctor will discuss further treatment options with you.  For severe symptoms: You may need to be admitted to the hospital. There, you can be given IV antibiotics and fluids. Once symptoms are under control, the above treatments may be tried. If these don t control your condition, your doctor may discuss the option of having surgery with you.  [facility]  Help keep your colon healthy with  a diet that includes plenty of high-fiber fruits, vegetables, and whole grains. Drink plenty of liquids like water and juice. Your doctor may also recommend avoiding seeds and nuts.          [id number] [facility], [address] [place] [address], [place] [address]. All rights reserved. This information is not intended as a substitute for professional medical care. Always follow your healthcare professional's instructions.

## 2021-04-30 NOTE — H&P
Pre-Endoscopy History and Physical     Ophelia Caal MRN# [id number]   YOB: 1960 Age: 60 year old     Date of Procedure: 2021  Primary care provider: Syeda Mars  Type of Endoscopy: Colonoscopy with possible biopsy, possible polypectomy  Reason for Procedure: screen  Type of Anesthesia Anticipated: Conscious Sedation    HPI:    Ophelia is a 60 year old female who will be undergoing the above procedure.      A history and physical has been performed. The patient's medications and allergies have been reviewed. The risks and benefits of the procedure and the sedation options and risks were discussed with the patient.  All questions were answered and informed consent was obtained.      She denies a personal or family history of anesthesia complications or bleeding disorders.     Patient Active Problem List   Diagnosis     Moderate persistent asthma without complication     Anxiety     Benign essential hypertension     Dermatitis     Neoplasm of uncertain behavior     Seborrheic keratosis     Multiple nevi     Solar lentiginosis     History of arteriovenous malformation     Mixed hyperlipidemia     Hyperparathyroidism (H)        Past Medical History:   Diagnosis Date     Acid reflux      Hyperparathyroidism (H) 10/22/2018     Hypertension      Thyroid disease      Uncomplicated asthma         Past Surgical History:   Procedure Laterality Date     BREAST SURGERY      duct removal -       SECTION           ENT SURGERY  3/4/2018     HYSTERECTOMY      no cervix     HYSTERECTOMY, PAP NO LONGER INDICATED       SINUS SURGERY      deviated septum & polyp removal     TEMPORAL ARTERY LIGATN OR BX      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      done at [facility]       Social History     Tobacco Use     Smoking status: Never Smoker     Smokeless tobacco: Never Used   Substance Use Topics     Alcohol use: Yes     Comment: Typically 1-2 per week about 2 drinks       Family  "History   Problem Relation Age of Onset     Hypertension Father      Hyperlipidemia Father      Asthma Son      Hypertension Brother      Hyperlipidemia Brother      Hyperlipidemia Sister      Hyperlipidemia Sister      Depression Sister      Anxiety Disorder Sister      Colon Cancer No family hx of        Prior to Admission medications    Medication Sig Start Date End Date Taking? Authorizing Provider   atorvastatin (LIPITOR) 20 MG tablet Take 1 tablet (20 mg) by mouth daily 3/11/21  Yes Syeda Mars APRN CNP   DUPIXENT 300 MG/2ML SOPN  2/25/21  Yes Reported, Patient   fluticasone (FLONASE) 50 MCG/ACT spray Spray 1 spray into both nostrils daily   Yes Reported, Patient   loratadine (CLARITIN) 10 MG tablet Take 1 tablet (10 mg) by mouth daily 4/13/21  Yes Syeda Mars APRN CNP   losartan (COZAAR) 50 MG tablet Take 1 tablet (50 mg) by mouth daily 4/13/21  Yes Syeda Mars APRN CNP   Multiple Vitamins-Minerals (MULTIVITAMIN PO)    Yes Reported, Patient   Omega-3 Fatty Acids (FISH OIL PO)    Yes Reported, Patient   VITAMIN D, CHOLECALCIFEROL, PO Take by mouth daily   Yes Reported, Patient   albuterol (PROAIR HFA/PROVENTIL HFA/VENTOLIN HFA) 108 (90 BASE) MCG/ACT Inhaler Inhale 2 puffs into the lungs every 6 hours 4/4/17   Lien Howard APRN CNP       Allergies   Allergen Reactions     Fentanyl Hives     Itchiness, nausea     Doxycycline Rash        REVIEW OF SYSTEMS:   5 point ROS negative except as noted above in HPI, including Gen., Resp., CV, GI &  system review.    PHYSICAL EXAM:   Ht 1.689 m (5' 6.5\")   Wt 80.3 kg (177 lb)   BMI 28.14 kg/m   Estimated body mass index is 28.14 kg/m  as calculated from the following:    Height as of this encounter: 1.689 m (5' 6.5\").    Weight as of this encounter: 80.3 kg (177 lb).   GENERAL APPEARANCE: alert, and oriented  MENTAL STATUS: alert  AIRWAY EXAM: Mallampatti Class I (visualization of the soft palate, fauces, uvula, anterior and posterior " pillars)  RESP: lungs clear to auscultation - no rales, rhonchi or wheezes  CV: regular rates and rhythm  DIAGNOSTICS:    Not indicated    IMPRESSION   ASA Class 2 - Mild systemic disease    PLAN:   Plan for Colonoscopy with possible biopsy, possible polypectomy. We discussed the risks, benefits and alternatives and the patient wished to proceed.    The above has been forwarded to the consulting provider.      Signed Electronically by: Kirk Shannon MD  April 30, 2021

## 2021-04-30 NOTE — LETTER
April 6, 2021      Ophelia Caal  [address] [facility] [address]        Dear Ophelia,     Please be aware that coverage of these services is subject to the terms and limitations of your health insurance plan.  Call member services at your health plan with any benefit or coverage questions.    Thank you for choosing [facility]. You are scheduled for the following service(s):    Date:  4/30/21             Procedure:  COLONOSCOPY  Doctor:        Dr. Shannon   Arrival Time:  7:30  *Enter and check in at the Main Hospital Entrance*  Procedure Time:  8:00      Location:   [facility]        Endoscopy Department, First Floor         [address] [place], [place] [address]      [phone] or [phone] (Formerly [facility]) to reschedule      MIRALAX -GATORADE  PREP  Colonoscopy is the most accurate test to detect colon polyps and colon cancer; and the only test where polyps can be removed. During this procedure, a doctor examines the lining of your large intestine and rectum through a flexible tube.   Transportation  You must arrange for a ride for the day of your procedure with a responsible adult. A taxi , Uber, etc, is not an option unless you are accompanied by a responsible adult. If you fail to arrange transportation with a responsible adult, your procedure will be cancelled and rescheduled.    Purchase the  following supplies at your local pharmacy:  - 2 (two) bisacodyl tablets: each tablet contains 5 mg.  (Dulcolax  laxative NOT Dulcolax  stool softener)   - 1 (one) 8.3 oz bottle of Polyethylene Glycol (PEG) 3350 Powder   (MiraLAX , Smooth LAX , ClearLAX  or equivalent)  - 64 oz Gatorade    Regular Gatorade, Gatorade G2 , Powerade , Powerade Zero  or Pedialyte  is acceptable. Red colored flavors are not allowed; all other colors (yellow, green, orange, purple and blue) are okay. It is also okay to buy two 2.12 oz packets of powdered Gatorade that can be mixed with  water to a total volume of 64 oz of liquid.  - 1 (one) 10 oz bottle of Magnesium Citrate (Red colored flavors are not allowed)  It is also okay for you to use a 0.5 oz package of powdered magnesium citrate (17 g) mixed with 10 oz of water.      PREPARATION FOR COLONOSCOPY    7 days before:    Discontinue fiber supplements and medications containing iron. This includes Metamucil  and Fibercon ; and multivitamins with iron.    3 days before:    Begin a low-fiber diet. A low-fiber diet helps making the cleanout more effective.     Examples of a low-fiber diet include (but are not limited to): white bread, white rice, pasta, crackers, fish, chicken, eggs, ground beef, creamy peanut butter, cooked/steamed/boiled vegetables, canned fruit, bananas, melons, milk, plain yogurt cheese, salad dressing and other condiments.     The following are not allowed on a low-fiber diet: seeds, nuts, popcorn, bran, whole wheat, corn, quinoa, raw fruits and vegetables, berries and dried fruit, beans and lentils.    For additional details on low-fiber diet, please refer to the table on the last page.    2 days before:    Continue the low-fiber diet.     Drink at least 8 glasses of water throughout the day.     Stop eating solid foods at 11:45 pm.    1 day before:    In the morning: begin a clear liquid diet (liquids you can see through).     Examples of a clear liquid diet include: water, clear broth or bouillon, Gatorade, Pedialyte or Powerade, carbonated and non-carbonated soft drinks (Sprite , 7-Up , ginger ale), strained fruit juices without pulp (apple, white grape, white cranberry), Jell-O  and popsicles.     The following are not allowed on a clear liquid diet: red liquids, alcoholic beverages, dairy products (milk, creamer, and yogurt), protein shakes, creamy broths, juice with pulp and chewing tobacco.    At noon: take 2 (two) bisacodyl tablets     At 4 (and no later than 6pm): start drinking the Miralax-Gatorade preparation (8.3  oz of Miralax mixed with 64 oz of Gatorade in a large pitcher). Drink 1(one) 8 oz glass every 15 minutes thereafter, until the mixture is gone.    COLON CLEANSING TIPS: drink adequate amounts of fluids before and after your colon cleansing to prevent dehydration. Stay near a toilet because you will have diarrhea. Even if you are sitting on the toilet, continue to drink the cleansing solution every 15 minutes. If you feel nauseous or vomit, rinse your mouth with water, take a 15 to 30-minute-break and then continue drinking the solution. You will be uncomfortable until the stool has flushed from your colon (in about 2 to 4 hours). You may feel chilled.    Day of your procedure  You may take all of your morning medications including blood pressure medications, blood thinners (if you have not been instructed to stop these by our office), methadone, anti-seizure medications with sips of water 3 hours prior to your procedure or earlier. Do not take insulin or vitamins prior to your procedure. Continue the clear liquid diet.       4 hours prior: drink 10 oz of magnesium citrate. It may be easier to drink it with a straw.    STOP consuming all liquids after that.     Do not take anything by mouth during this time.     Allow extra time to travel to your procedure as you may need to stop and use a restroom along the way.    You are ready for the procedure, if you followed all instructions and your stool is no longer formed, but clear or yellow liquid. If you are unsure whether your colon is clean, please call our office at [phone] before you leave for your appointment.    Bring the following to your procedure:  - Insurance Card/Photo ID.   - List of current medications including over-the-counter medications and supplements.   - Your rescue inhaler if you currently use one to control asthma.    Canceling or rescheduling your appointment:   If you must cancel or reschedule your appointment, please call [phone] as  soon as possible.      COLONOSCOPY PRE-PROCEDURE CHECKLIST    If you have diabetes, ask your regular doctor for diet and medication restrictions.  If you take an anticoagulant or anti-platelet medication (such as Coumadin , Lovenox , Pradaxa , Xarelto , Eliquis , etc.), please call your primary doctor for advice on holding this medication.  If you take aspirin you may continue to do so.  If you are or may be pregnant, please discuss the risks and benefits of this procedure with your doctor.        What happens during a colonoscopy?    Plan to spend up to two hours, starting at registration time, at the endoscopy center the day of your procedure. The colonoscopy takes an average of 15 to 30 minutes. Recovery time is about 30 minutes.      Before the exam:    You will change into a gown.    Your medical history and medication list will be reviewed with you, unless that has been done over the phone prior to the procedure.     A nurse will insert an intravenous (IV) line into your hand or arm.    The doctor will meet with you and will give you a consent form to sign.  During the exam:     Medicine will be given through the IV line to help you relax.     Your heart rate and oxygen levels will be monitored. If your blood pressure is low, you may be given fluids through the IV line.     The doctor will insert a flexible hollow tube, called a colonoscope, into your rectum. The scope will be advanced slowly through the large intestine (colon).    You may have a feeling of fullness or pressure.     If an abnormal tissue or a polyp is found, the doctor may remove it through the endoscope for closer examination, or biopsy. Tissue removal is painless    After the exam:           Any tissue samples removed during the exam will be sent to a lab for evaluation. It may take 5-7 working days for you to be notified of the results.     A nurse will provide you with complete discharge instructions before you leave the endoscopy center.  Be sure to ask the nurse for specific instructions if you take blood thinners such as Aspirin, Coumadin or Plavix.     The doctor will prepare a full report for you and for the physician who referred you for the procedure.     Your doctor will talk with you about the initial results of your exam.      Medication given during the exam will prohibit you from driving for the rest of the day.     Following the exam, you may resume your normal diet. Your first meal should be light, no greasy foods. Avoid alcohol until the next day.     You may resume your regular activities the day after the procedure.         LOW-FIBER DIET    Foods RECOMMENDED Foods to AVOID   Breads, Cereal, Rice and Pasta:   White bread, rolls, biscuits, croissant and boy toast.   Waffles, Arabic toast and pancakes.   White rice, noodles, pasta, macaroni and peeled cooked potatoes.   Plain crackers and saltines.   Cooked cereals: farina, cream of rice.   Cold cereals: Puffed Rice , Rice Krispies , Corn Flakes  and Special K    Breads, Cereal, Rice and Pasta:   Breads or rolls with nuts, seeds or fruit.   Whole wheat, pumpernickel, rye breads and cornbread.   Potatoes with skin, brown or wild rice, and kasha (buckwheat).     Vegetables:   Tender cooked and canned vegetables without seeds: carrots, asparagus tips, green or wax beans, pumpkin, spinach, lima beans. Vegetables:   Raw or steamed vegetables.   Vegetables with seeds.   Sauerkraut.   Winter squash, peas, broccoli, Brussel sprouts, cabbage, onions, cauliflower, baked beans, peas and corn.   Fruits:   Strained fruit juice.   Canned fruit, except pineapple.   Ripe bananas and melon. Fruits:   Prunes and prune juice.   Raw fruits.   Dried fruits: figs, dates and raisins.   Milk/Dairy:   Milk: plain or flavored.   Yogurt, custard and ice cream.   Cheese and cottage cheese Milk/Dairy:     Meat and other proteins:   ground, well-cooked tender beef, lamb, ham, veal, pork, fish, poultry and organ  meats.   Eggs.   Peanut butter without nuts. Meat and other proteins:   Tough, fibrous meats with gristle.   Dry beans, peas and lentils.   Peanut butter with nuts.   Tofu.   Fats, Snack, Sweets, Condiments and Beverages:   Margarine, butter, oils, mayonnaise, sour cream and salad dressing, plain gravy.   Sugar, hard candy, clear jelly, honey and syrup.   Spices, cooked herbs, bouillon, broth and soups made with allowed vegetable, ketchup and mustard.   Coffee, tea and carbonated drinks.   Plain cakes, cookies and pretzels.   Gelatin, plain puddings, custard, ice cream, sherbet and popsicles. Fats, Snack, Sweets, Condiments and Beverages:   Nuts, seeds and coconut.   Jam, marmalade and preserves.   Pickles, olives, relish and horseradish.   All desserts containing nuts, seeds, dried fruit and coconut; or made from whole grains or bran.   Candy made with nuts or seeds.   Popcorn.         DIRECTIONS TO THE ENDOSCOPY DEPARTMENT    From the north ([facility])  Take [address] South, exit on [address] [address]. Get into the left hand jak, turn left (east), go one-half mile to [address] and turn left. Go north to the second stoplight, take a right on [address] and follow it to the Main Hospital entrance.    From the south ([facility])  Take 35N to the 35E split and exit on [address] [address]. On [address] [address], turn left (west) to [address]. Turn right (north) on [address]. Go north to the second stoplight, take a right on [address] and follow it to the Main Hospital entrance.    From the east via 35E ([facility])  Take [address]E south to [address] [address] exit. Turn right on [address] [address]. Go west to [address]. Turn right (north) on [address]. Go to the second stoplight, take a right on [address] to the Main Hospital entrance.    From the east via [address] ([facility])  Take [address] to [address]. Turn left (south) on [address] to [address], turn left (east) on [address] and follow it to the Main Hospital entrance.    From the west via [address] ([place])  Take [address] east to [address]. Turn right (south) on [address] to [address], turn left (east) on [address] and follow it to the Main Hospital entrance.

## 2021-05-03 LAB — COPATH REPORT: NORMAL

## 2021-05-18 ENCOUNTER — TELEPHONE (OUTPATIENT)
Dept: FAMILY MEDICINE | Facility: CLINIC | Age: 61
End: 2021-05-18

## 2021-05-18 DIAGNOSIS — M25.532 LEFT WRIST PAIN: Primary | ICD-10-CM

## 2021-05-18 DIAGNOSIS — G56.02 CARPAL TUNNEL SYNDROME OF LEFT WRIST: ICD-10-CM

## 2021-05-18 NOTE — TELEPHONE ENCOUNTER
Attempted to reach patient, no answer.      Please call to review EMG results.      Left arm with abnormal findings.  +Evidence of mild left median mononeuropathy at the wrist.   - consistent with mild carpal tunnel syndrome and chronic denervation, which is consistent with proximal left median mononeuropathy.      Orthopedics referral placed.  Please give contact information.      - Tiana, CNP

## 2021-05-20 NOTE — TELEPHONE ENCOUNTER
Attempt # 1    Called # [phone]     Left a non detailed VM to call back at [phone] and ask for any available Triage Nurse.     Responded to [employer] message with results.    Alejandra Swanson RN  [facility]

## 2021-06-10 ENCOUNTER — APPOINTMENT (OUTPATIENT)
Dept: URBAN - METROPOLITAN AREA CLINIC 256 | Age: 61
Setting detail: DERMATOLOGY
End: 2021-06-10

## 2021-06-10 VITALS — RESPIRATION RATE: 16 BRPM | WEIGHT: 180 LBS | HEIGHT: 67 IN

## 2021-06-10 DIAGNOSIS — D18.0 HEMANGIOMA: ICD-10-CM

## 2021-06-10 DIAGNOSIS — L81.4 OTHER MELANIN HYPERPIGMENTATION: ICD-10-CM

## 2021-06-10 DIAGNOSIS — L82.1 OTHER SEBORRHEIC KERATOSIS: ICD-10-CM

## 2021-06-10 DIAGNOSIS — D22 MELANOCYTIC NEVI: ICD-10-CM

## 2021-06-10 DIAGNOSIS — L57.8 OTHER SKIN CHANGES DUE TO CHRONIC EXPOSURE TO NONIONIZING RADIATION: ICD-10-CM

## 2021-06-10 DIAGNOSIS — Z87.2 PERSONAL HISTORY OF DISEASES OF THE SKIN AND SUBCUTANEOUS TISSUE: ICD-10-CM

## 2021-06-10 PROBLEM — D18.01 HEMANGIOMA OF SKIN AND SUBCUTANEOUS TISSUE: Status: ACTIVE | Noted: 2021-06-10

## 2021-06-10 PROBLEM — D22.5 MELANOCYTIC NEVI OF TRUNK: Status: ACTIVE | Noted: 2021-06-10

## 2021-06-10 PROCEDURE — OTHER COUNSELING: OTHER

## 2021-06-10 PROCEDURE — OTHER REASSURANCE: OTHER

## 2021-06-10 PROCEDURE — 99203 OFFICE O/P NEW LOW 30 MIN: CPT

## 2021-06-10 ASSESSMENT — LOCATION DETAILED DESCRIPTION DERM
LOCATION DETAILED: RIGHT MID-UPPER BACK
LOCATION DETAILED: RIGHT MEDIAL UPPER BACK
LOCATION DETAILED: LEFT MEDIAL UPPER BACK
LOCATION DETAILED: SUPERIOR THORACIC SPINE

## 2021-06-10 ASSESSMENT — LOCATION ZONE DERM: LOCATION ZONE: TRUNK

## 2021-06-10 ASSESSMENT — LOCATION SIMPLE DESCRIPTION DERM
LOCATION SIMPLE: UPPER BACK
LOCATION SIMPLE: LEFT UPPER BACK
LOCATION SIMPLE: RIGHT UPPER BACK

## 2021-07-07 NOTE — PROGRESS NOTES
SUBJECTIVE:                                                   Ophelia Caal is a 60 year old female who presents to clinic today for the following health issue(s):  Patient presents with:  Vaginal Problem: C/o of a few skin colored bumps on vulva, denies any pain or itching. Also has ongoing vaginal discomfort and aching.     HPI:  NP to me here today with c/o vaginal discomfort/spasms for quite a while. She also noticed a few fleshy bumps on the labia over a month ago. They are not painful.   Denies any  issues.     She is not s.a. s/p LUDMILA 2/2 fibroids.     No LMP recorded. Patient has had a hysterectomy.     Patient is not sexually active, .  Using hysterectomy for contraception.    reports that she has never smoked. She has never used smokeless tobacco.    STD testing offered?  Declined    Health maintenance updated:  yes    Today's PHQ-2 Score:   PHQ-2 (  [employer]) 2021   Q1: Little interest or pleasure in doing things 0   Q2: Feeling down, depressed or hopeless 0   PHQ-2 Score 0   Q1: Little interest or pleasure in doing things -   Q2: Feeling down, depressed or hopeless -   PHQ-2 Score -     Today's PHQ-9 Score:   PHQ-9 SCORE 2021   PHQ-9 Total Score 3     Today's MALIK-7 Score:   MALIK-7 SCORE 2021   Total Score 2       Problem list and histories reviewed & adjusted, as indicated.  Additional history: as documented.    Patient Active Problem List   Diagnosis     Moderate persistent asthma without complication     Anxiety     Benign essential hypertension     Dermatitis     Neoplasm of uncertain behavior     Seborrheic keratosis     Multiple nevi     Solar lentiginosis     History of arteriovenous malformation     Mixed hyperlipidemia     Hyperparathyroidism (H)     Past Surgical History:   Procedure Laterality Date     BREAST SURGERY      duct removal -       SECTION           ENT SURGERY  3/4/2018     HYSTERECTOMY      no cervix     HYSTERECTOMY, PAP NO LONGER INDICATED        SINUS SURGERY      deviated septum & polyp removal     TEMPORAL ARTERY LIGATN OR BX      temporal artery removed Jan 1999     temporal avm - left      done at [facility]      Social History     Tobacco Use     Smoking status: Never Smoker     Smokeless tobacco: Never Used   Substance Use Topics     Alcohol use: Yes     Comment: Typically 1-2 per week about 2 drinks      Problem (# of Occurrences) Relation (Name,Age of Onset)    Anxiety Disorder (1) Sister (Malia)    Asthma (1) Son (Guido)    Depression (1) Sister (Malia)    Hyperlipidemia (4) Father (Chris), Brother (Marciano), Sister (Radha), Sister (Malia)    Hypertension (2) Father (Chris), Brother (Marciano)       Negative family history of: Colon Cancer            Current Outpatient Medications   Medication Sig     albuterol (PROAIR HFA/PROVENTIL HFA/VENTOLIN HFA) 108 (90 BASE) MCG/ACT Inhaler Inhale 2 puffs into the lungs every 6 hours     atorvastatin (LIPITOR) 20 MG tablet Take 1 tablet (20 mg) by mouth daily (Patient taking differently: Take 40 mg by mouth daily )     DUPIXENT 300 MG/2ML SOPN      estradiol (ESTRACE) 0.1 MG/GM vaginal cream Place 1 g vaginally At Bedtime For 30 nights, then three times per week thereafter. Use finger for application. Apply to labia as well.     fluticasone (FLONASE) 50 MCG/ACT spray Spray 1 spray into both nostrils daily     loratadine (CLARITIN) 10 MG tablet Take 1 tablet (10 mg) by mouth daily     losartan (COZAAR) 50 MG tablet Take 1 tablet (50 mg) by mouth daily     Multiple Vitamins-Minerals (MULTIVITAMIN PO)      Omega-3 Fatty Acids (FISH OIL PO)      VITAMIN D, CHOLECALCIFEROL, PO Take by mouth daily     No current facility-administered medications for this visit.      Allergies   Allergen Reactions     Sulfa Drugs Hives     Fentanyl Hives     Itchiness, nausea     Amides Itching, Nausea and Rash     Doxycycline Rash       ROS:  12 point review of systems negative other than symptoms noted below or in the HPI.  Genitourinary:  "vaginal bumps and discomfort  No urinary frequency or dysuria, bladder or kidney problems      OBJECTIVE:     BP (!) 148/74   Ht 1.702 m (5' 7\")   Wt 83 kg (183 lb)   BMI 28.66 kg/m    Body mass index is 28.66 kg/m .    Exam:  Constitutional:  Appearance: Well nourished, well developed alert, in no acute distress  Psychiatric:  Mentation appears normal and affect normal/bright.  Pelvic Exam:  External Genitalia:     Normal appearance for age, no discharge present, no tenderness present, no inflammatory lesions present, color normal-ATROPHIC INTROITUS  Vagina:     ATROPHIC vaginal vault without central or paravaginal defects, no discharge present, no inflammatory lesions present, no masses present  Urethra:   Urethral Meatus:  No erythema or lesions present  Cervix:     Surgically absent  Perineum:     Perineum within normal limits, no evidence of trauma, no rashes or skin lesions present-singular small 3mm sebaceous cyst, clear boarder of crepey skin on labia  Anus:     Anus within normal limits, no hemorrhoids present  Inguinal Lymph Nodes:     No lymphadenopathy present  Pubic Hair:     Normal pubic hair distribution for age  Genitalia and Groin:     No rashes present, no lesions present, no areas of discoloration, no masses present       In-Clinic Test Results:  No results found for this or any previous visit (from the past 24 hour(s)).    ASSESSMENT/PLAN:                                                        ICD-10-CM    1. Vaginal atrophy  N95.2 estradiol (ESTRACE) 0.1 MG/GM vaginal cream   2. Special screening for osteoporosis  Z13.820 DX Hip/Pelvis/Spine       There are no Patient Instructions on file for this visit.    Plan on vag E2 cream both internally and externally for 3-4 months. rtc at that time for dexa and med check.     KENNEDY Vaca CNP  [facility] [place]  "

## 2021-07-08 ENCOUNTER — OFFICE VISIT (OUTPATIENT)
Dept: OBGYN | Facility: CLINIC | Age: 61
End: 2021-07-08
Payer: COMMERCIAL

## 2021-07-08 VITALS
SYSTOLIC BLOOD PRESSURE: 148 MMHG | DIASTOLIC BLOOD PRESSURE: 74 MMHG | BODY MASS INDEX: 28.72 KG/M2 | WEIGHT: 183 LBS | HEIGHT: 67 IN

## 2021-07-08 DIAGNOSIS — N95.2 VAGINAL ATROPHY: Primary | ICD-10-CM

## 2021-07-08 DIAGNOSIS — Z13.820 SPECIAL SCREENING FOR OSTEOPOROSIS: ICD-10-CM

## 2021-07-08 PROCEDURE — 99203 OFFICE O/P NEW LOW 30 MIN: CPT | Performed by: NURSE PRACTITIONER

## 2021-07-08 RX ORDER — ESTRADIOL 0.1 MG/G
1 CREAM VAGINAL AT BEDTIME
Qty: 42.5 G | Refills: 3 | Status: SHIPPED | OUTPATIENT
Start: 2021-07-08 | End: 2023-05-12

## 2021-07-08 ASSESSMENT — ANXIETY QUESTIONNAIRES
6. BECOMING EASILY ANNOYED OR IRRITABLE: NOT AT ALL
GAD7 TOTAL SCORE: 2
IF YOU CHECKED OFF ANY PROBLEMS ON THIS QUESTIONNAIRE, HOW DIFFICULT HAVE THESE PROBLEMS MADE IT FOR YOU TO DO YOUR WORK, TAKE CARE OF THINGS AT HOME, OR GET ALONG WITH OTHER PEOPLE: NOT DIFFICULT AT ALL
2. NOT BEING ABLE TO STOP OR CONTROL WORRYING: NOT AT ALL
1. FEELING NERVOUS, ANXIOUS, OR ON EDGE: SEVERAL DAYS
5. BEING SO RESTLESS THAT IT IS HARD TO SIT STILL: NOT AT ALL
3. WORRYING TOO MUCH ABOUT DIFFERENT THINGS: NOT AT ALL
7. FEELING AFRAID AS IF SOMETHING AWFUL MIGHT HAPPEN: NOT AT ALL

## 2021-07-08 ASSESSMENT — PATIENT HEALTH QUESTIONNAIRE - PHQ9
SUM OF ALL RESPONSES TO PHQ QUESTIONS 1-9: 3
5. POOR APPETITE OR OVEREATING: SEVERAL DAYS

## 2021-07-08 ASSESSMENT — MIFFLIN-ST. JEOR: SCORE: 1437.71

## 2021-07-09 ASSESSMENT — ANXIETY QUESTIONNAIRES: GAD7 TOTAL SCORE: 2

## 2021-10-23 ENCOUNTER — HEALTH MAINTENANCE LETTER (OUTPATIENT)
Age: 61
End: 2021-10-23

## 2022-01-04 NOTE — PROGRESS NOTES
SUBJECTIVE:                                                   Ophelia Caal is a 61 year old female who presents to clinic today for the following health issue(s):  Patient presents with:  Recheck Medication: F/u to vag E2 cream. Pt states she is not as compliant as she should be but she feels there is some improvement.  Results: Bone density results    HPI:  Patient here today for her 3-month check of vaginal estrogen initiation.  We saw her back in July and started her on vaginal estrogen cream she has been fairly compliant trying to use it at least twice weekly.  She is not sexually active.  She also had her bone scan today.    No LMP recorded. Patient has had a hysterectomy..     Patient is not sexually active, .  Using hysterectomy for contraception.    reports that she has never smoked. She has never used smokeless tobacco.    STD testing offered?  Declined    Health maintenance updated:  yes    Today's PHQ-2 Score:   PHQ-2 (  [employer]) 2021   Q1: Little interest or pleasure in doing things 0   Q2: Feeling down, depressed or hopeless 0   PHQ-2 Score 0   PHQ-2 Total Score (12-17 Years)- Positive if 3 or more points; Administer PHQ-A if positive 0   Q1: Little interest or pleasure in doing things -   Q2: Feeling down, depressed or hopeless -   PHQ-2 Score -     Today's PHQ-9 Score:   PHQ-9 SCORE 2021   PHQ-9 Total Score 3     Today's MALIK-7 Score:   MALIK-7 SCORE 2021   Total Score 2       Problem list and histories reviewed & adjusted, as indicated.  Additional history: as documented.    Patient Active Problem List   Diagnosis     Moderate persistent asthma without complication     Anxiety     Benign essential hypertension     Dermatitis     Neoplasm of uncertain behavior     Seborrheic keratosis     Multiple nevi     Solar lentiginosis     History of arteriovenous malformation     Mixed hyperlipidemia     Hyperparathyroidism (H)     Past Surgical History:   Procedure Laterality Date      BREAST SURGERY      duct removal - 2016      SECTION           ENT SURGERY  3/4/2018     HYSTERECTOMY      no cervix     HYSTERECTOMY, PAP NO LONGER INDICATED       SINUS SURGERY      deviated septum & polyp removal     TEMPORAL ARTERY LIGATN OR BX      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      done at [place]      Social History     Tobacco Use     Smoking status: Never Smoker     Smokeless tobacco: Never Used   Substance Use Topics     Alcohol use: Yes     Comment: Typically 1-2 per week about 2 drinks      Problem (# of Occurrences) Relation (Name,Age of Onset)    Anxiety Disorder (1) Sister (Malia)    Asthma (1) Son (Guido)    Cerebrovascular Disease (1) Father (Bud)    Depression (1) Sister (Malia)    Hyperlipidemia (4) Father (Chris), Sister (Radha), Sister (Malia), Brother (Marciano)    Hypertension (2) Father (Chris), Brother (Marciano)       Negative family history of: Colon Cancer            Current Outpatient Medications   Medication Sig     albuterol (PROAIR HFA/PROVENTIL HFA/VENTOLIN HFA) 108 (90 BASE) MCG/ACT Inhaler Inhale 2 puffs into the lungs every 6 hours     atorvastatin (LIPITOR) 20 MG tablet Take 1 tablet (20 mg) by mouth daily (Patient taking differently: Take 40 mg by mouth daily )     DUPIXENT 300 MG/2ML SOPN      estradiol (ESTRACE) 0.1 MG/GM vaginal cream Place 1 g vaginally At Bedtime For 30 nights, then three times per week thereafter. Use finger for application. Apply to labia as well.     fluticasone (FLONASE) 50 MCG/ACT spray Spray 1 spray into both nostrils daily     loratadine (CLARITIN) 10 MG tablet Take 1 tablet (10 mg) by mouth daily     losartan (COZAAR) 50 MG tablet Take 1 tablet (50 mg) by mouth daily     Multiple Vitamins-Minerals (MULTIVITAMIN PO)      Omega-3 Fatty Acids (FISH OIL PO)      VITAMIN D, CHOLECALCIFEROL, PO Take by mouth daily     No current facility-administered medications for this visit.     Allergies   Allergen Reactions     Sulfa Drugs  "Hives     Fentanyl Hives     Itchiness, nausea     Amides Itching, Nausea and Rash     Doxycycline Rash       ROS:  12 point review of systems negative other than symptoms noted below or in the HPI.  No urinary frequency or dysuria, bladder or kidney problems      OBJECTIVE:     BP (!) 152/92   Ht 1.702 m (5' 7\")   Wt 87.5 kg (192 lb 12.8 oz)   Breastfeeding No   BMI 30.20 kg/m    Body mass index is 30.2 kg/m .    Exam:  Constitutional:  Appearance: Well nourished, well developed alert, in no acute distress  Psychiatric:  Mentation appears normal and affect normal/bright.  Pelvic Exam:  External Genitalia:     Normal appearance for age, no discharge present, no tenderness present, no inflammatory lesions present, color normal  Vagina:     Normal vaginal vault without central or paravaginal defects, no discharge present, no inflammatory lesions present, no masses present good estrogen effect  Urethra:   Urethral Meatus:  No erythema or lesions present  Cervix:     Surgically absent  Perineum:     Perineum within normal limits, no evidence of trauma, no rashes or skin lesions present  Anus:     Anus within normal limits, no hemorrhoids present  Inguinal Lymph Nodes:     No lymphadenopathy present  Pubic Hair:     Normal pubic hair distribution for age  Genitalia and Groin:     No rashes present, no lesions present, no areas of discoloration, no masses present       In-Clinic Test Results:  No results found for this or any previous visit (from the past 24 hour(s)).    ASSESSMENT/PLAN:                                                        ICD-10-CM    1. Vaginal atrophy  N95.2    2. Osteopenia, senile  M85.80        There are no Patient Instructions on file for this visit.    61-year-old female with very good estrogen effect using vaginal estrogen twice weekly.  We have encouraged her to continue with this use.  Her DEXA scan shows osteopenia at all levels worse at her left hip.  We have given her recommendations " for supplements and will repeat in 2 to 3 years.    KENNEDY Vaca CNP [facility] [place]

## 2022-01-05 ENCOUNTER — OFFICE VISIT (OUTPATIENT)
Dept: OBGYN | Facility: CLINIC | Age: 62
End: 2022-01-05
Payer: COMMERCIAL

## 2022-01-05 ENCOUNTER — ANCILLARY PROCEDURE (OUTPATIENT)
Dept: BONE DENSITY | Facility: CLINIC | Age: 62
End: 2022-01-05
Attending: NURSE PRACTITIONER
Payer: COMMERCIAL

## 2022-01-05 VITALS
BODY MASS INDEX: 30.26 KG/M2 | WEIGHT: 192.8 LBS | HEIGHT: 67 IN | SYSTOLIC BLOOD PRESSURE: 152 MMHG | DIASTOLIC BLOOD PRESSURE: 92 MMHG

## 2022-01-05 DIAGNOSIS — Z13.820 SPECIAL SCREENING FOR OSTEOPOROSIS: ICD-10-CM

## 2022-01-05 DIAGNOSIS — M85.80 OSTEOPENIA, SENILE: ICD-10-CM

## 2022-01-05 DIAGNOSIS — N95.2 VAGINAL ATROPHY: Primary | ICD-10-CM

## 2022-01-05 PROCEDURE — 77080 DXA BONE DENSITY AXIAL: CPT | Performed by: OBSTETRICS & GYNECOLOGY

## 2022-01-05 PROCEDURE — 99212 OFFICE O/P EST SF 10 MIN: CPT | Performed by: NURSE PRACTITIONER

## 2022-01-05 ASSESSMENT — MIFFLIN-ST. JEOR: SCORE: 1477.17

## 2022-02-05 NOTE — PROGRESS NOTES
Ophelia Caal is being followed for Blood Pressure management.      BP Readings from Last 3 Encounters:   04/13/21 (!) 150/100   03/11/21 (!) 144/102   01/28/20 (!) 142/98       Wt Readings from Last 2 Encounters:   03/11/21 80.3 kg (177 lb)   01/28/20 82.6 kg (182 lb)       Is pulse 55 or greater? - Yes    Pulse Readings from Last 1 Encounters:   04/13/21 73       Current blood pressure medication(s):  Current Outpatient Medications   Medication Sig Dispense Refill     albuterol (PROAIR HFA/PROVENTIL HFA/VENTOLIN HFA) 108 (90 BASE) MCG/ACT Inhaler Inhale 2 puffs into the lungs every 6 hours 1 Inhaler 3     atorvastatin (LIPITOR) 20 MG tablet Take 1 tablet (20 mg) by mouth daily 90 tablet 3     DUPIXENT 300 MG/2ML SOPN        fluticasone (FLONASE) 50 MCG/ACT spray Spray 1 spray into both nostrils daily       loratadine (CLARITIN) 10 MG tablet Take 1 tablet (10 mg) by mouth daily       losartan (COZAAR) 25 MG tablet Take 1 tablet (25 mg) by mouth daily 90 tablet 3     Multiple Vitamins-Minerals (MULTIVITAMIN PO)        Omega-3 Fatty Acids (FISH OIL PO)        VITAMIN D, CHOLECALCIFEROL, PO Take by mouth daily            1. Follow up instructions include:     Next Provider visit: Follow up in 3 months..      SUBJECTIVE:                                                    The patient is taking medication as prescribed and is tolerating well.   Patient is monitoring Blood Pressure at home.   Last 3 home readings   140-150's/'s        Per LOV: Hypertension Follow-up - has been out of losartan for a few months      Do you check your blood pressure regularly outside of the clinic? Yes     Are you following a low salt diet? No    Are your blood pressures ever more than 140 on the top number (systolic) OR more       than 90 on the bottom number (diastolic), for example 140/90? Yes      She ran out of her Losartan a couple of months ago. BP running 140s/90s when she checks at home (checks every couple days). No  Transfer report given.  Cardiac cath scheduled in am.  Patient has an allergy to contrast please follow scheduled prophylactic  Orders.  Patient denies chest and head pain at this time.  Right groin site has bruising and a bandage is now in place.  Call [phone] if you have any questions   significant side effects when taking Losartan 25 mg.         Out of the following complicating factors: Cough, Headache, Lightheadedness, Shortness of breath, Fatigue, Nausea, Sexual Dysfunction, New onset of swelling or edema, Weakness and New onset of Chest Pain, the patient reports:  None and does not SOB when going up stairs, ok when walking at fast pace though.      OBJECTIVE:                                                      Today's BP completed using cuff size: regular on right side arm.      Potassium   Date Value Ref Range Status   03/11/2021 4.1 3.4 - 5.3 mmol/L Final     Creatinine   Date Value Ref Range Status   03/11/2021 0.82 0.52 - 1.04 mg/dL Final     Urea Nitrogen   Date Value Ref Range Status   03/11/2021 17 7 - 30 mg/dL Final     GFR Estimate   Date Value Ref Range Status   03/11/2021 78 >60 mL/min/[1.73_m2] Final     Comment:     Non  GFR Calc  Starting 12/18/2018, serum creatinine based estimated GFR (eGFR) will be   calculated using the Chronic Kidney Disease Epidemiology Collaboration   (CKD-EPI) equation.       Pt noted to still be hypertensive, advised will route to PCP to review and advise. Pt would prefer mychart sent.        Education:  general discussion/verbal explanation  Ways to help improve BP/HTN:   Medications  Lose weight  Diet low in fat and rich in fruits, vegetables and low fat dairy products  Reduce salt in diet  Do something active for at least 30 minutes a day on most days of the week  Cut down on alcohol (if you drink more than 2 drinks per day)  Decrease stress (exercise, read, yoga, meditation, time for self, etc.)   Patient was given an opportunity to ask questions.    Patient verbalized understanding of this plan and is agreeable.    Rm Jones RN

## 2022-05-11 ENCOUNTER — OFFICE VISIT (OUTPATIENT)
Dept: FAMILY MEDICINE | Facility: CLINIC | Age: 62
End: 2022-05-11
Payer: COMMERCIAL

## 2022-05-11 VITALS
HEIGHT: 67 IN | OXYGEN SATURATION: 98 % | SYSTOLIC BLOOD PRESSURE: 140 MMHG | BODY MASS INDEX: 28.88 KG/M2 | WEIGHT: 184 LBS | DIASTOLIC BLOOD PRESSURE: 90 MMHG | HEART RATE: 84 BPM | TEMPERATURE: 97.9 F

## 2022-05-11 DIAGNOSIS — Z00.00 ROUTINE GENERAL MEDICAL EXAMINATION AT A HEALTH CARE FACILITY: Primary | ICD-10-CM

## 2022-05-11 DIAGNOSIS — I10 BENIGN ESSENTIAL HYPERTENSION: ICD-10-CM

## 2022-05-11 DIAGNOSIS — E78.5 HYPERLIPIDEMIA, UNSPECIFIED HYPERLIPIDEMIA TYPE: ICD-10-CM

## 2022-05-11 DIAGNOSIS — E21.3 HYPERPARATHYROIDISM (H): ICD-10-CM

## 2022-05-11 DIAGNOSIS — Z12.31 ENCOUNTER FOR SCREENING MAMMOGRAM FOR BREAST CANCER: ICD-10-CM

## 2022-05-11 DIAGNOSIS — J45.40 MODERATE PERSISTENT ASTHMA WITHOUT COMPLICATION: ICD-10-CM

## 2022-05-11 LAB
ALBUMIN SERPL-MCNC: 3.9 G/DL (ref 3.4–5)
ALP SERPL-CCNC: 87 U/L (ref 40–150)
ALT SERPL W P-5'-P-CCNC: 45 U/L (ref 0–50)
ANION GAP SERPL CALCULATED.3IONS-SCNC: 7 MMOL/L (ref 3–14)
AST SERPL W P-5'-P-CCNC: 32 U/L (ref 0–45)
BILIRUB SERPL-MCNC: 0.8 MG/DL (ref 0.2–1.3)
BUN SERPL-MCNC: 18 MG/DL (ref 7–30)
CALCIUM SERPL-MCNC: 10 MG/DL (ref 8.5–10.1)
CHLORIDE BLD-SCNC: 106 MMOL/L (ref 94–109)
CHOLEST SERPL-MCNC: 235 MG/DL
CO2 SERPL-SCNC: 24 MMOL/L (ref 20–32)
CREAT SERPL-MCNC: 0.69 MG/DL (ref 0.52–1.04)
CREAT UR-MCNC: 115 MG/DL
DEPRECATED CALCIDIOL+CALCIFEROL SERPL-MC: 38 UG/L (ref 20–75)
FASTING STATUS PATIENT QL REPORTED: YES
GFR SERPL CREATININE-BSD FRML MDRD: >90 ML/MIN/1.73M2
GLUCOSE BLD-MCNC: 103 MG/DL (ref 70–99)
HDLC SERPL-MCNC: 50 MG/DL
LDLC SERPL CALC-MCNC: 147 MG/DL
LDLC SERPL CALC-MCNC: ABNORMAL MG/DL
MICROALBUMIN UR-MCNC: 7 MG/L
MICROALBUMIN/CREAT UR: 6.09 MG/G CR (ref 0–25)
NONHDLC SERPL-MCNC: 185 MG/DL
POTASSIUM BLD-SCNC: 4.7 MMOL/L (ref 3.4–5.3)
PROT SERPL-MCNC: 8.1 G/DL (ref 6.8–8.8)
PTH-INTACT SERPL-MCNC: 71 PG/ML (ref 18–80)
SODIUM SERPL-SCNC: 137 MMOL/L (ref 133–144)
TRIGL SERPL-MCNC: 458 MG/DL
TSH SERPL DL<=0.005 MIU/L-ACNC: 2.02 MU/L (ref 0.4–4)

## 2022-05-11 PROCEDURE — 82306 VITAMIN D 25 HYDROXY: CPT | Performed by: NURSE PRACTITIONER

## 2022-05-11 PROCEDURE — 80053 COMPREHEN METABOLIC PANEL: CPT | Performed by: NURSE PRACTITIONER

## 2022-05-11 PROCEDURE — 99396 PREV VISIT EST AGE 40-64: CPT | Performed by: NURSE PRACTITIONER

## 2022-05-11 PROCEDURE — 99214 OFFICE O/P EST MOD 30 MIN: CPT | Mod: 25 | Performed by: NURSE PRACTITIONER

## 2022-05-11 PROCEDURE — 83970 ASSAY OF PARATHORMONE: CPT | Performed by: NURSE PRACTITIONER

## 2022-05-11 PROCEDURE — 84443 ASSAY THYROID STIM HORMONE: CPT | Performed by: NURSE PRACTITIONER

## 2022-05-11 PROCEDURE — 36415 COLL VENOUS BLD VENIPUNCTURE: CPT | Performed by: NURSE PRACTITIONER

## 2022-05-11 PROCEDURE — 80061 LIPID PANEL: CPT | Performed by: NURSE PRACTITIONER

## 2022-05-11 PROCEDURE — 82043 UR ALBUMIN QUANTITATIVE: CPT | Performed by: NURSE PRACTITIONER

## 2022-05-11 RX ORDER — ATORVASTATIN CALCIUM 40 MG/1
40 TABLET, FILM COATED ORAL DAILY
Qty: 90 TABLET | Refills: 3 | Status: SHIPPED | OUTPATIENT
Start: 2022-05-11 | End: 2023-05-12

## 2022-05-11 RX ORDER — INFLUENZA VIRUS VACCINE 15; 15; 15; 15 UG/.5ML; UG/.5ML; UG/.5ML; UG/.5ML
SUSPENSION INTRAMUSCULAR
COMMUNITY
Start: 2021-11-02 | End: 2022-05-11

## 2022-05-11 RX ORDER — LOSARTAN POTASSIUM AND HYDROCHLOROTHIAZIDE 12.5; 5 MG/1; MG/1
1 TABLET ORAL DAILY
Qty: 90 TABLET | Refills: 3 | Status: SHIPPED | OUTPATIENT
Start: 2022-05-11 | End: 2023-05-12

## 2022-05-11 RX ORDER — CX-024414 0.2 MG/ML
INJECTION, SUSPENSION INTRAMUSCULAR
COMMUNITY
Start: 2021-12-02 | End: 2022-05-11

## 2022-05-11 ASSESSMENT — ANXIETY QUESTIONNAIRES
7. FEELING AFRAID AS IF SOMETHING AWFUL MIGHT HAPPEN: SEVERAL DAYS
GAD7 TOTAL SCORE: 7
6. BECOMING EASILY ANNOYED OR IRRITABLE: SEVERAL DAYS
1. FEELING NERVOUS, ANXIOUS, OR ON EDGE: SEVERAL DAYS
7. FEELING AFRAID AS IF SOMETHING AWFUL MIGHT HAPPEN: SEVERAL DAYS
8. IF YOU CHECKED OFF ANY PROBLEMS, HOW DIFFICULT HAVE THESE MADE IT FOR YOU TO DO YOUR WORK, TAKE CARE OF THINGS AT HOME, OR GET ALONG WITH OTHER PEOPLE?: SOMEWHAT DIFFICULT
5. BEING SO RESTLESS THAT IT IS HARD TO SIT STILL: SEVERAL DAYS
4. TROUBLE RELAXING: SEVERAL DAYS
3. WORRYING TOO MUCH ABOUT DIFFERENT THINGS: SEVERAL DAYS
GAD7 TOTAL SCORE: 7
GAD7 TOTAL SCORE: 7
2. NOT BEING ABLE TO STOP OR CONTROL WORRYING: SEVERAL DAYS

## 2022-05-11 ASSESSMENT — ENCOUNTER SYMPTOMS
FREQUENCY: 0
EYE PAIN: 0
COUGH: 0
CONSTIPATION: 0
HEARTBURN: 0
SHORTNESS OF BREATH: 0
NERVOUS/ANXIOUS: 0
JOINT SWELLING: 0
ARTHRALGIAS: 0
HEMATOCHEZIA: 0
CHILLS: 0
MYALGIAS: 0
FEVER: 0
HEADACHES: 0
DIZZINESS: 0
PARESTHESIAS: 0
HEMATURIA: 0
DIARRHEA: 0
NAUSEA: 0
PALPITATIONS: 0
DYSURIA: 0
ABDOMINAL PAIN: 0
SORE THROAT: 0

## 2022-05-11 ASSESSMENT — PATIENT HEALTH QUESTIONNAIRE - PHQ9
SUM OF ALL RESPONSES TO PHQ QUESTIONS 1-9: 5
10. IF YOU CHECKED OFF ANY PROBLEMS, HOW DIFFICULT HAVE THESE PROBLEMS MADE IT FOR YOU TO DO YOUR WORK, TAKE CARE OF THINGS AT HOME, OR GET ALONG WITH OTHER PEOPLE: SOMEWHAT DIFFICULT
SUM OF ALL RESPONSES TO PHQ QUESTIONS 1-9: 5

## 2022-05-11 ASSESSMENT — ASTHMA QUESTIONNAIRES: ACT_TOTALSCORE: 25

## 2022-05-11 NOTE — PROGRESS NOTES
SUBJECTIVE:   CC: Ophelia Caal is an 61 year old woman who presents for preventive health visit.       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    Increased stress - Mother with COPD/lung cancer and Father with dementia, stroke history.      Recently joined [employer].          Hyperlipidemia Follow-Up  Recent Labs   Lab Test 03/11/21  0809 01/28/20  0904   CHOL 359* 366*   HDL 64 50   * Cannot estimate LDL when triglyceride exceeds 400 mg/dL  252*   TRIG 316* 510*   Has been needing Atorvastatin refill.       Are you regularly taking any medication or supplement to lower your cholesterol?   Yes- Lipitor    Are you having muscle aches or other side effects that you think could be caused by your cholesterol lowering medication?  No    Hypertension Follow-up  Taking Losartan 50 mg daily on a consistent basis.        Do you check your blood pressure regularly outside of the clinic? Yes     Are you following a low salt diet? Yes    Are your blood pressures ever more than 140 on the top number (systolic) OR more   than 90 on the bottom number (diastolic), for example 140/90? Yes    Asthma Follow-Up    Was ACT completed today?    Yes    ACT Total Scores 5/11/2022   ACT TOTAL SCORE (Goal Greater than or Equal to 20) 25   In the past 12 months, how many times did you visit the emergency room for your asthma without being admitted to the hospital? 0   In the past 12  months, how many times were you hospitalized overnight because of your asthma? 0          How many days per week do you miss taking your asthma controller medication?  0    Please describe any recent triggers for your asthma: dust mites, pollens, strong odors and fumes and cold air    Have you had any Emergency Room Visits, Urgent Care Visits, or Hospital Admissions since your last office visit?  No      Today's PHQ-2 Score:   PHQ-2 ( 1999 [employer]) 5/11/2022   Q1: Little interest or pleasure in doing things 0   Q2: Feeling down, depressed or hopeless 1   PHQ-2 Score 1   PHQ-2 Total Score (12-17 Years)- Positive if 3 or more points; Administer PHQ-A if positive -   Q1: Little interest or pleasure in doing things Not at all   Q2: Feeling down, depressed or hopeless Several days   PHQ-2 Score 1       Abuse: Current or Past (Physical, Sexual or Emotional) - No  Do you feel safe in your environment? Yes        Social History     Tobacco Use     Smoking status: Never Smoker     Smokeless tobacco: Never Used   Substance Use Topics     Alcohol use: Yes     Comment: Typically 1-2 per week about 2 drinks     If you drink alcohol do you typically have >3 drinks per day or >7 drinks per week? No    Alcohol Use 5/11/2022   Prescreen: >3 drinks/day or >7 drinks/week? No   Prescreen: >3 drinks/day or >7 drinks/week? -       Reviewed orders with patient.  Reviewed health maintenance and updated orders accordingly - Yes  BP Readings from Last 3 Encounters:   05/11/22 (!) 140/90   01/05/22 (!) 152/92   07/08/21 (!) 148/74    Wt Readings from Last 3 Encounters:   05/11/22 83.5 kg (184 lb)   01/05/22 87.5 kg (192 lb 12.8 oz)   07/08/21 83 kg (183 lb)                  Patient Active Problem List   Diagnosis     Moderate persistent asthma without complication     Anxiety     Benign essential hypertension     Dermatitis     Neoplasm of uncertain behavior     Seborrheic keratosis     Multiple nevi     Solar lentiginosis     History of  arteriovenous malformation     Mixed hyperlipidemia     Hyperparathyroidism (H)     Past Surgical History:   Procedure Laterality Date     BREAST SURGERY      duct removal - 2016      SECTION           ENT SURGERY  3/4/2018     HYSTERECTOMY      no cervix     HYSTERECTOMY, PAP NO LONGER INDICATED       SINUS SURGERY      deviated septum & polyp removal     TEMPORAL ARTERY LIGATN OR BX      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      done at [place]       Social History     Tobacco Use     Smoking status: Never Smoker     Smokeless tobacco: Never Used   Substance Use Topics     Alcohol use: Yes     Comment: Typically 1-2 per week about 2 drinks     Family History   Problem Relation Age of Onset     Hypertension Father      Hyperlipidemia Father      Cerebrovascular Disease Father      Hyperlipidemia Sister      Hyperlipidemia Sister      Depression Sister      Anxiety Disorder Sister      Hypertension Brother      Hyperlipidemia Brother      Asthma Son      Colon Cancer No family hx of          Current Outpatient Medications   Medication Sig Dispense Refill     atorvastatin (LIPITOR) 40 MG tablet Take 1 tablet (40 mg) by mouth daily 90 tablet 3     DUPIXENT 300 MG/2ML SOPN        loratadine (CLARITIN) 10 MG tablet Take 1 tablet (10 mg) by mouth daily       losartan-hydrochlorothiazide (HYZAAR) 50-12.5 MG tablet Take 1 tablet by mouth daily 90 tablet 3     Multiple Vitamins-Minerals (MULTIVITAMIN PO)        Omega-3 Fatty Acids (FISH OIL PO)        VITAMIN D, CHOLECALCIFEROL, PO Take by mouth daily       albuterol (PROAIR HFA/PROVENTIL HFA/VENTOLIN HFA) 108 (90 BASE) MCG/ACT Inhaler Inhale 2 puffs into the lungs every 6 hours 1 Inhaler 3     estradiol (ESTRACE) 0.1 MG/GM vaginal cream Place 1 g vaginally At Bedtime For 30 nights, then three times per week thereafter. Use finger for application. Apply to labia as well. 42.5 g 3     fluticasone (FLONASE) 50 MCG/ACT spray Spray 1 spray into both  nostrils daily         Breast Cancer Screening:    FHS-7:   Breast CA Risk Assessment (FHS-7) 3/11/2021 2022   Did any of your first-degree relatives have breast or ovarian cancer? No Yes   Did any of your relatives have bilateral breast cancer? No No   Did any man in your family have breast cancer? No No   Did any woman in your family have breast and ovarian cancer? No No   Did any woman in your family have breast cancer before age 50 y? No No   Do you have 2 or more relatives with breast and/or ovarian cancer? Yes No   Do you have 2 or more relatives with breast and/or bowel cancer? No No       Mammogram Screening: Recommended mammography every 1-2 years with patient discussion and risk factor consideration  Pertinent mammograms are reviewed under the imaging tab.    History of abnormal Pap smear: Status post benign hysterectomy. Health Maintenance and Surgical History updated.     Reviewed and updated as needed this visit by clinical staff   Tobacco  Allergies  Meds  Problems  Med Hx  Surg Hx  Fam Hx            Reviewed and updated as needed this visit by Provider                   Past Medical History:   Diagnosis Date     Acid reflux      Hyperparathyroidism (H) 10/22/2018     Hypertension      Thyroid disease      Uncomplicated asthma       Past Surgical History:   Procedure Laterality Date     BREAST SURGERY      duct removal -       SECTION           ENT SURGERY  3/4/2018     HYSTERECTOMY      no cervix     HYSTERECTOMY, PAP NO LONGER INDICATED       SINUS SURGERY      deviated septum & polyp removal     TEMPORAL ARTERY LIGATN OR BX      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      done at [place]       Review of Systems   Constitutional: Negative for chills and fever.   HENT: Positive for congestion. Negative for ear pain, hearing loss and sore throat.    Eyes: Negative for pain.   Respiratory: Negative for cough and shortness of breath.    Cardiovascular: Negative  "for chest pain, palpitations and peripheral edema.   Gastrointestinal: Negative for abdominal pain, constipation, diarrhea, heartburn, hematochezia and nausea.   Genitourinary: Negative for dysuria, frequency, genital sores, hematuria and urgency.   Musculoskeletal: Negative for arthralgias, joint swelling and myalgias.   Skin: Negative for rash.   Neurological: Negative for dizziness, headaches and paresthesias.   Psychiatric/Behavioral: Negative for mood changes. The patient is not nervous/anxious.           OBJECTIVE:   BP (!) 140/90   Pulse 84   Temp 97.9  F (36.6  C) (Tympanic)   Ht 1.702 m (5' 7\")   Wt 83.5 kg (184 lb)   SpO2 98%   BMI 28.82 kg/m    Physical Exam   GENERAL: healthy, alert and no distress  EYES: Eyes grossly normal to inspection, PERRL and conjunctivae and sclerae normal  HENT: ear canals and TM's normal, nose and mouth without ulcers or lesions  NECK: no adenopathy, no asymmetry, masses  RESP: lungs clear to auscultation - no rales, rhonchi or wheezes  CV: regular rate and rhythm, normal S1 S2, no S3 or S4, no murmur  ABDOMEN: soft, nontender, no hepatosplenomegaly, no masses and bowel sounds normal  MS: no gross musculoskeletal defects noted, no edema  SKIN: no suspicious lesions or rashes  NEURO: Normal strength and tone, mentation intact and speech normal  PSYCH: mentation appears normal, affect normal/bright      ASSESSMENT/PLAN:     Ophelia was seen today for physical.    Diagnoses and all orders for this visit:    Routine general medical examination at a health care facility    Encounter for screening mammogram for breast cancer  -     *MA Screening Digital Bilateral; Future    Benign essential hypertension  Currently uncontrolled on Losartan 50 mg daily, plan addition of hydrochlorothiazide.   Follow-up in 1 month for RN only blood pressure check.    Repeat BMP in 1 month as well.    -     losartan-hydrochlorothiazide (HYZAAR) 50-12.5 MG tablet; Take 1 tablet by mouth daily  -     " "Nutrition Referral; Future  -     Albumin Random Urine Quantitative with Creat Ratio  -     Comprehensive metabolic panel (BMP + Alb, Alk Phos, ALT, AST, Total. Bili, TP)  -     Cancel: Basic metabolic panel  (Ca, Cl, CO2, Creat, Gluc, K, Na, BUN)    Hyperlipidemia, unspecified hyperlipidemia type  Has most recently been taking 1/2 tablet due to being close to running out of medication   Fasting lipid panel today and continue with Atorvastatin 40 mg daily.    -     atorvastatin (LIPITOR) 40 MG tablet; Take 1 tablet (40 mg) by mouth daily  -     Nutrition Referral; Future  -     Lipid panel reflex to direct LDL Fasting    Hyperparathyroidism (H)  Surveillance labs.  Last seen by Dr. Best in 2018.    -     Parathyroid Hormone Intact  -     Vitamin D Deficiency  -     TSH with free T4 reflex    Moderate persistent asthma without complication  ACT Total Scores 1/3/2020 3/11/2021 5/11/2022   ACT TOTAL SCORE (Goal Greater than or Equal to 20) 22 24 25   In the past 12 months, how many times did you visit the emergency room for your asthma without being admitted to the hospital? 0 0 0   In the past 12 months, how many times were you hospitalized overnight because of your asthma? 0 0 0   Currently well controlled.      Other orders  -     REVIEW OF HEALTH MAINTENANCE PROTOCOL ORDERS          COUNSELING:  Reviewed preventive health counseling, as reflected in patient instructions    Estimated body mass index is 28.82 kg/m  as calculated from the following:    Height as of this encounter: 1.702 m (5' 7\").    Weight as of this encounter: 83.5 kg (184 lb).        She reports that she has never smoked. She has never used smokeless tobacco.      Counseling Resources:  ATP IV Guidelines  Pooled Cohorts Equation Calculator  Breast Cancer Risk Calculator  BRCA-Related Cancer Risk Assessment: FHS-7 Tool  FRAX Risk Assessment  ICSI Preventive Guidelines  Dietary Guidelines for Americans, 2010  USDA's MyPlate  ASA Prophylaxis  Lung " CA Screening    KENNEDY Stock [facility]  Answers for HPI/ROS submitted by the patient on 5/11/2022  If you checked off any problems, how difficult have these problems made it for you to do your work, take care of things at home, or get along with other people?: Somewhat difficult  PHQ9 TOTAL SCORE: 5  MALIK 7 TOTAL SCORE: 7

## 2022-05-12 ASSESSMENT — PATIENT HEALTH QUESTIONNAIRE - PHQ9: SUM OF ALL RESPONSES TO PHQ QUESTIONS 1-9: 5

## 2022-05-12 ASSESSMENT — ANXIETY QUESTIONNAIRES: GAD7 TOTAL SCORE: 7

## 2022-05-17 DIAGNOSIS — E78.5 HYPERLIPIDEMIA, UNSPECIFIED HYPERLIPIDEMIA TYPE: Primary | ICD-10-CM

## 2022-05-17 NOTE — RESULT ENCOUNTER NOTE
Dear Ophelia,     -LDL(bad) cholesterol level is elevated, and your triglycerides are elevated which can increase your heart disease risk.    Advise:  restart taking Atorvastatin 40 mg daily and then recheck your levels in 3-6 months (you can schedule a lab only visit).      -Liver and gallbladder tests (ALT,AST, Alk phos,bilirubin) are normal.  -Kidney function (GFR) is normal.  -Sodium is normal.  -Potassium is normal.  -Calcium is normal.  -Glucose is slightly elevated and may be a sign of early diabetes (prediabetes). ADVISE: eating a low carbohydrate diet, exercising, trying to lose weight (if necessary) and rechecking your glucose level in 12 months.    -TSH (thyroid stimulating hormone) level is normal which indicates normal thyroid function.  -Vitamin D level is normal and getting 1000 IU daily in your diet or supplements is recommended.   -PTH (parathyroid hormone) level is normal and reassuring.         Please send a [employer] message or call [phone]  if you have any questions.      KENNEDY Stock, CNP  [facility] [place]    If you have further questions about the interpretation of your labs, [email or web address] is a good website to check out for further information.

## 2022-05-24 ENCOUNTER — ANCILLARY PROCEDURE (OUTPATIENT)
Dept: MAMMOGRAPHY | Facility: CLINIC | Age: 62
End: 2022-05-24
Attending: NURSE PRACTITIONER
Payer: COMMERCIAL

## 2022-05-24 DIAGNOSIS — Z12.31 ENCOUNTER FOR SCREENING MAMMOGRAM FOR BREAST CANCER: ICD-10-CM

## 2022-05-24 PROCEDURE — 77063 BREAST TOMOSYNTHESIS BI: CPT | Mod: TC | Performed by: RADIOLOGY

## 2022-05-24 PROCEDURE — 77067 SCR MAMMO BI INCL CAD: CPT | Mod: TC | Performed by: RADIOLOGY

## 2022-05-25 NOTE — RESULT ENCOUNTER NOTE
Dear Ophelia,     -Mammogram was normal.  ADVISE: rechecking in 1 year.      Please send a [employer] message or call [phone]  if you have any questions.      KENNEDY Stock, CNP  [facility] [place]    If you have further questions about the interpretation of your labs, [email or web address] is a good website to check out for further information.

## 2022-07-12 ENCOUNTER — APPOINTMENT (OUTPATIENT)
Dept: URBAN - METROPOLITAN AREA CLINIC 253 | Age: 62
Setting detail: DERMATOLOGY
End: 2022-07-12

## 2022-07-12 VITALS — HEIGHT: 67 IN | WEIGHT: 180 LBS

## 2022-07-12 DIAGNOSIS — D22 MELANOCYTIC NEVI: ICD-10-CM

## 2022-07-12 DIAGNOSIS — L81.4 OTHER MELANIN HYPERPIGMENTATION: ICD-10-CM

## 2022-07-12 DIAGNOSIS — Z87.2 PERSONAL HISTORY OF DISEASES OF THE SKIN AND SUBCUTANEOUS TISSUE: ICD-10-CM

## 2022-07-12 DIAGNOSIS — L82.1 OTHER SEBORRHEIC KERATOSIS: ICD-10-CM

## 2022-07-12 DIAGNOSIS — D18.0 HEMANGIOMA: ICD-10-CM

## 2022-07-12 DIAGNOSIS — Z71.89 OTHER SPECIFIED COUNSELING: ICD-10-CM

## 2022-07-12 PROBLEM — D18.01 HEMANGIOMA OF SKIN AND SUBCUTANEOUS TISSUE: Status: ACTIVE | Noted: 2022-07-12

## 2022-07-12 PROBLEM — D22.5 MELANOCYTIC NEVI OF TRUNK: Status: ACTIVE | Noted: 2022-07-12

## 2022-07-12 PROCEDURE — OTHER MIPS QUALITY: OTHER

## 2022-07-12 PROCEDURE — OTHER COUNSELING: OTHER

## 2022-07-12 PROCEDURE — 99213 OFFICE O/P EST LOW 20 MIN: CPT

## 2022-07-12 PROCEDURE — OTHER ADDITIONAL NOTES: OTHER

## 2022-07-12 ASSESSMENT — LOCATION DETAILED DESCRIPTION DERM
LOCATION DETAILED: RIGHT SUPERIOR MEDIAL UPPER BACK
LOCATION DETAILED: INFERIOR THORACIC SPINE

## 2022-07-12 ASSESSMENT — LOCATION ZONE DERM: LOCATION ZONE: TRUNK

## 2022-07-12 ASSESSMENT — LOCATION SIMPLE DESCRIPTION DERM
LOCATION SIMPLE: UPPER BACK
LOCATION SIMPLE: RIGHT UPPER BACK

## 2022-07-12 NOTE — HPI: FULL BODY SKIN EXAMINATION
What Type Of Note Output Would You Prefer (Optional)?: Standard Output
What Is The Reason For Today's Visit?: Full Body Skin Examination
What Is The Reason For Today's Visit? (Being Monitored For X): the re-examination of lesions previously examined
Additional History: FBE. History of atypical moles. No concerns today.

## 2022-07-12 NOTE — PROCEDURE: ADDITIONAL NOTES
Additional Notes: A clinical assistant was present for the exam. Care instructions were explained to the patient in detail. Told patient to call with any concerns or questions. The patient verbalized understanding and agreement of the education provided and the treatment plan. Encouraged patient to schedule a follow up appointment right after visit. At the end of the visit, all questions had been answered and the patient was satisfied with the visit.
Detail Level: Simple
Render Risk Assessment In Note?: no

## 2022-10-09 ENCOUNTER — HEALTH MAINTENANCE LETTER (OUTPATIENT)
Age: 62
End: 2022-10-09

## 2022-11-17 ENCOUNTER — LAB (OUTPATIENT)
Dept: LAB | Facility: CLINIC | Age: 62
End: 2022-11-17
Payer: COMMERCIAL

## 2022-11-17 DIAGNOSIS — I10 BENIGN ESSENTIAL HYPERTENSION: ICD-10-CM

## 2022-11-17 DIAGNOSIS — E78.5 HYPERLIPIDEMIA, UNSPECIFIED HYPERLIPIDEMIA TYPE: ICD-10-CM

## 2022-11-17 LAB
ANION GAP SERPL CALCULATED.3IONS-SCNC: 12 MMOL/L (ref 7–15)
BUN SERPL-MCNC: 19.5 MG/DL (ref 8–23)
CALCIUM SERPL-MCNC: 10.1 MG/DL (ref 8.8–10.2)
CHLORIDE SERPL-SCNC: 100 MMOL/L (ref 98–107)
CHOLEST SERPL-MCNC: 249 MG/DL
CREAT SERPL-MCNC: 0.78 MG/DL (ref 0.51–0.95)
DEPRECATED HCO3 PLAS-SCNC: 25 MMOL/L (ref 22–29)
GFR SERPL CREATININE-BSD FRML MDRD: 86 ML/MIN/1.73M2
GLUCOSE SERPL-MCNC: 103 MG/DL (ref 70–99)
HDLC SERPL-MCNC: 51 MG/DL
LDLC SERPL CALC-MCNC: ABNORMAL MG/DL
LDLC SERPL DIRECT ASSAY-MCNC: 134 MG/DL
NONHDLC SERPL-MCNC: 198 MG/DL
POTASSIUM SERPL-SCNC: 4.2 MMOL/L (ref 3.4–5.3)
SODIUM SERPL-SCNC: 137 MMOL/L (ref 136–145)
TRIGL SERPL-MCNC: 487 MG/DL

## 2022-11-17 PROCEDURE — 80048 BASIC METABOLIC PNL TOTAL CA: CPT

## 2022-11-17 PROCEDURE — 36415 COLL VENOUS BLD VENIPUNCTURE: CPT

## 2022-11-17 PROCEDURE — 83721 ASSAY OF BLOOD LIPOPROTEIN: CPT | Mod: 59

## 2022-11-17 PROCEDURE — 80061 LIPID PANEL: CPT

## 2022-11-18 NOTE — RESULT ENCOUNTER NOTE
Dear Ophelia,     -LDL(bad) cholesterol level is elevated, and your triglycerides are elevated which can increase your heart disease risk.  I encourage continued consistent use of cholesterol medication (Atorvastatin) 40 mg once daily.      -Kidney function (GFR) is normal.  -Sodium is normal.  -Potassium is normal.  -Calcium is normal.  -Glucose is slightly elevated and may be a sign of early diabetes (prediabetes). ADVISE: eating a low carbohydrate diet, exercising, trying to lose weight (if necessary) and rechecking your glucose level in 12 months.      Please send a [employer] message or call [phone]  if you have any questions.      Syeda Mars, KENNEDY, CNP  [facility] [place]    If you have further questions about the interpretation of your labs, [email or web address] is a good website to check out for further information.

## 2023-03-28 NOTE — PROGRESS NOTES
SUBJECTIVE:   CC: Ophelia Caal is an 59 year old woman who presents for preventive health visit.     Healthy Habits:      Do you get at least three servings of calcium containing foods daily (dairy, green leafy vegetables, etc.)? yes and no, taking calcium and/or vitamin D supplement: yes - Vitamin D    Amount of exercise or daily activities, outside of work: 2 day(s) per week    Problems taking medications regularly Yes, sometimes forgetful     Medication side effects: No    Have you had an eye exam in the past two years? yes    Do you see a dentist twice per year? yes    Do you have sleep apnea, excessive snoring or daytime drowsiness?no    Is taking half of her Losartan- was told her BP was good so was told half of Losartan was ok.       Didn't refill Simvastatin.  Has been off of this for several months.    Recent Labs   Lab Test 10/02/18  0752 08/14/18  0801   CHOL 319* 324*   HDL 59 57   * Cannot estimate LDL when triglyceride exceeds 400 mg/dL  204*   TRIG 225* 513*       Breast Problem-  Left breast pain- intermittent, started after mammogram, x 1 months.  More noticeable with palpation.  Left side and nipple area.  No nipple discharge.    No redness, swelling or pain.     Didn't start Celexa due to concerns regarding interaction with Omeprazole.   History of significant caregiver stress related to increased stress with mother with history of lung cancer/COPD (age 80) and father with dementia (age 83).  Would like to restart Celexa prescription.      MALIK-7 SCORE 1/9/2018   Total Score 4           GERD symptoms have improved.  Is still taking Omeprazole, 40 mg daily.      Today's PHQ-2 Score:   PHQ-2 ( 1999 [employer]) 1/3/2020 10/11/2018   Q1: Little interest or pleasure in doing things 1 0   Q2: Feeling down, depressed or hopeless 1 0   PHQ-2 Score 2 0   Q1: Little interest or pleasure in doing things Several days -   Q2: Feeling down, depressed or hopeless Several days -   PHQ-2 Score 2 -  [address] [facility]  [phone]    OPERATVE REPORT      Name: Ramonita Fung     Abuse: Current or Past(Physical, Sexual or Emotional)- No  Do you feel safe in your environment? Yes        Social History     Tobacco Use     Smoking status: Never Smoker     Smokeless tobacco: Never Used   Substance Use Topics     Alcohol use: Yes     Comment: Typically 1-2 per week about 2 drinks     If you drink alcohol do you typically have >3 drinks per day or >7 drinks per week? No                     Reviewed orders with patient.  Reviewed health maintenance and updated orders accordingly - Yes  BP Readings from Last 3 Encounters:   20 (!) 142/98   20 124/70   10/22/18 (!) 140/92    Wt Readings from Last 3 Encounters:   20 82.6 kg (182 lb)   20 83.6 kg (184 lb 6.4 oz)   10/22/18 79.2 kg (174 lb 11.2 oz)                  Patient Active Problem List   Diagnosis     Moderate persistent asthma without complication     Anxiety     Benign essential hypertension     Dermatitis     Neoplasm of uncertain behavior     Seborrheic keratosis     Multiple nevi     Solar lentiginosis     History of arteriovenous malformation     Mixed hyperlipidemia     Hyperparathyroidism (H)     Past Surgical History:   Procedure Laterality Date     BREAST SURGERY      duct removal -       SECTION           HYSTERECTOMY      no cervix     HYSTERECTOMY, PAP NO LONGER INDICATED       SINUS SURGERY      deviated septum & polyp removal     TEMPORAL ARTERY LIGATN OR BX      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      done at [place]       Social History     Tobacco Use     Smoking status: Never Smoker     Smokeless tobacco: Never Used   Substance Use Topics     Alcohol use: Yes     Comment: Typically 1-2 per week about 2 drinks     Family History   Problem Relation Age of Onset     Asthma Son          Current Outpatient Medications   Medication Sig Dispense Refill     albuterol (PROAIR HFA/PROVENTIL HFA/VENTOLIN HFA) 108 (90 BASE) MCG/ACT Inhaler Inhale 2 puffs into the lungs every  6 hours 1 Inhaler 3     ALLERGY INJECTIONS twice a week       atorvastatin (LIPITOR) 10 MG tablet Take 1 tablet (10 mg) by mouth daily 90 tablet 3     citalopram (CELEXA) 20 MG tablet Take 1/2 tablet daily for 1-2 weeks until tolerated then increase to 1 tablet daily 30 tablet 1     famotidine (PEPCID) 40 MG tablet Take 1 tablet (40 mg) by mouth nightly as needed for heartburn 90 tablet 3     fluticasone (FLONASE) 50 MCG/ACT spray Spray 1 spray into both nostrils daily       loratadine (CLARITIN) 10 MG tablet Take 10 mg by mouth daily       losartan (COZAAR) 25 MG tablet Take 1 tablet (25 mg) by mouth daily 30 tablet 2     mometasone (ELOCON) 0.1 % ointment Twice daily to hand rash until clear, then as needed. 90 g 2     Multiple Vitamins-Minerals (MULTIVITAMIN PO)        Omega-3 Fatty Acids (FISH OIL PO)        VITAMIN D, CHOLECALCIFEROL, PO Take by mouth daily       hydrocortisone 2.5 % ointment Twice daily as needed to rash by mouth (Patient not taking: Reported on 2020) 30 g 1       Mammogram Screening: Patient over age 50, mutual decision to screen reflected in health maintenance.    Pertinent mammograms are reviewed under the imaging tab.  History of abnormal Pap smear: Status post benign hysterectomy. Health Maintenance and Surgical History updated.     Reviewed and updated as needed this visit by clinical staff  Tobacco  Allergies  Meds         Reviewed and updated as needed this visit by Provider        Past Medical History:   Diagnosis Date     Acid reflux      Hyperparathyroidism (H) 10/22/2018     Hypertension      Thyroid disease      Uncomplicated asthma       Past Surgical History:   Procedure Laterality Date     BREAST SURGERY      duct removal -       SECTION           HYSTERECTOMY      no cervix     HYSTERECTOMY, PAP NO LONGER INDICATED       SINUS SURGERY      deviated septum & polyp removal     TEMPORAL ARTERY LIGATN OR BX      temporal artery removed 1999     temporal  "avm - left      done at [place]       ROS:  CONSTITUTIONAL: NEGATIVE for fever, chills, change in weight  INTEGUMENTARY/SKIN: NEGATIVE for worrisome rashes, moles or lesions  EYES: NEGATIVE for vision changes or irritation  ENT: NEGATIVE for ear, mouth and throat problems  RESP: NEGATIVE for significant cough or SOB  BREAST: NEGATIVE for masses, tenderness or discharge  CV: NEGATIVE for chest pain, palpitations or peripheral edema  GI: NEGATIVE for nausea, abdominal pain, heartburn, or change in bowel habits  : NEGATIVE for unusual urinary or vaginal symptoms. No vaginal bleeding.  MUSCULOSKELETAL: NEGATIVE for significant arthralgias or myalgia  NEURO: NEGATIVE for weakness, dizziness or paresthesias  PSYCHIATRIC: NEGATIVE for changes in mood or affect     OBJECTIVE:   BP (!) 142/98 (BP Location: Right arm, Patient Position: Sitting, Cuff Size: Adult Regular)   Pulse 102   Temp 97.7  F (36.5  C) (Oral)   Ht 1.687 m (5' 6.42\")   Wt 82.6 kg (182 lb)   SpO2 97%   BMI 29.01 kg/m    EXAM:  GENERAL APPEARANCE: healthy, alert and no distress  EYES: Eyes grossly normal to inspection, PERRL and conjunctivae and sclerae normal  HENT: ear canals and TM's normal, nose and mouth without ulcers or lesions, oropharynx clear and oral mucous membranes moist  NECK: no adenopathy, no asymmetry, masses, or scars and thyroid normal to palpation  RESP: lungs clear to auscultation - no rales, rhonchi or wheezes  BREAST: left lateral breast (at 4-6 o'clock with tenderness to palpation) - no erythema, without nipple discharge and no palpable axillary masses or adenopathy  CV: regular rate and rhythm, normal S1 S2, no S3 or S4, no murmur, click or rub, no peripheral edema  ABDOMEN: soft, nontender, no hepatosplenomegaly, no masses and bowel sounds normal  MS: no musculoskeletal defects are noted and gait is age appropriate without ataxia  SKIN: no suspicious lesions or rashes  NEURO: Normal strength and tone, sensory exam grossly " normal, mentation intact and speech normal  PSYCH: mentation appears normal and affect normal/bright      ASSESSMENT/PLAN:     Ophelia was seen today for physical.    Diagnoses and all orders for this visit:    Routine general medical examination at a health care facility  -     TDAP VACCINE (ADACEL)  -          ADMIN VACCINE, FIRST    Benign essential hypertension  Not currently well controlled.  Patient previously taking Losartan, 25 mg - 1/2 tablet daily.  Increase Losartan to 25 mg daily and will plan BP recheck in 1 month.    -     Albumin Random Urine Quantitative with Creat Ratio  -     losartan (COZAAR) 25 MG tablet; Take 1 tablet (25 mg) by mouth daily  -     Comprehensive metabolic panel (BMP + Alb, Alk Phos, ALT, AST, Total. Bili, TP)    Caregiver stress  Plan to initiate Celexa.  Will closely follow and recheck in 1 month.    -     citalopram (CELEXA) 20 MG tablet; Take 1/2 tablet daily for 1-2 weeks until tolerated then increase to 1 tablet daily    Gastroesophageal reflux disease, esophagitis presence not specified  Recommended titration off PPI (patient to take Omeprazole every other day for 1 week) and start famotidine 40 mg at bedtime.    -     famotidine (PEPCID) 40 MG tablet; Take 1 tablet (40 mg) by mouth nightly as needed for heartburn    Hyperlipidemia, unspecified hyperlipidemia type  Recent Labs   Lab Test 10/02/18  0752 08/14/18  0801   CHOL 319* 324*   HDL 59 57   * Cannot estimate LDL when triglyceride exceeds 400 mg/dL  204*   TRIG 225* 513*   Changed statin therapy from Simvastatin to Atorvastatin.   Patient with previous inconsistent use of Simvastatin.   Reviewed exercise and dietary modifications ([place] diet).    -     Lipid panel reflex to direct LDL Fasting  -     atorvastatin (LIPITOR) 10 MG tablet; Take 1 tablet (10 mg) by mouth daily    Breast pain  Left breast pain, will proceed with diagnostic mammogram and ultrasound.    -     MA Diagnostic Digital Bilateral;  "Future  -     US Breast Left Limited 1-3 Quadrants; Future      COUNSELING:   Reviewed preventive health counseling, as reflected in patient instructions       Regular exercise       Healthy diet/nutrition    Estimated body mass index is 29.01 kg/m  as calculated from the following:    Height as of this encounter: 1.687 m (5' 6.42\").    Weight as of this encounter: 82.6 kg (182 lb).    Weight management plan: Discussed healthy diet and exercise guidelines     reports that she has never smoked. She has never used smokeless tobacco.      Counseling Resources:  ATP IV Guidelines  Pooled Cohorts Equation Calculator  Breast Cancer Risk Calculator  FRAX Risk Assessment  ICSI Preventive Guidelines  Dietary Guidelines for Americans, 2010  USDA's MyPlate  ASA Prophylaxis  Lung CA Screening    Syeda Mars, KENNEDY [facility]  "

## 2023-05-08 ASSESSMENT — ENCOUNTER SYMPTOMS
MYALGIAS: 1
ARTHRALGIAS: 1
DYSURIA: 0
PALPITATIONS: 0
SHORTNESS OF BREATH: 0
FEVER: 0
CONSTIPATION: 0
HEARTBURN: 0
JOINT SWELLING: 1
NERVOUS/ANXIOUS: 0
DIARRHEA: 0
HEADACHES: 1
FREQUENCY: 0
HEMATOCHEZIA: 0
WEAKNESS: 0
PARESTHESIAS: 0
ABDOMINAL PAIN: 0
DIZZINESS: 0
BREAST MASS: 0
CHILLS: 0
COUGH: 0
NAUSEA: 0
HEMATURIA: 0
SORE THROAT: 0
EYE PAIN: 1

## 2023-05-08 ASSESSMENT — ASTHMA QUESTIONNAIRES
ACT_TOTALSCORE: 25
QUESTION_1 LAST FOUR WEEKS HOW MUCH OF THE TIME DID YOUR ASTHMA KEEP YOU FROM GETTING AS MUCH DONE AT WORK, SCHOOL OR AT HOME: NONE OF THE TIME
QUESTION_4 LAST FOUR WEEKS HOW OFTEN HAVE YOU USED YOUR RESCUE INHALER OR NEBULIZER MEDICATION (SUCH AS ALBUTEROL): NOT AT ALL
ACT_TOTALSCORE: 25
QUESTION_5 LAST FOUR WEEKS HOW WOULD YOU RATE YOUR ASTHMA CONTROL: COMPLETELY CONTROLLED
QUESTION_2 LAST FOUR WEEKS HOW OFTEN HAVE YOU HAD SHORTNESS OF BREATH: NOT AT ALL
QUESTION_3 LAST FOUR WEEKS HOW OFTEN DID YOUR ASTHMA SYMPTOMS (WHEEZING, COUGHING, SHORTNESS OF BREATH, CHEST TIGHTNESS OR PAIN) WAKE YOU UP AT NIGHT OR EARLIER THAN USUAL IN THE MORNING: NOT AT ALL

## 2023-05-12 ENCOUNTER — OFFICE VISIT (OUTPATIENT)
Dept: FAMILY MEDICINE | Facility: CLINIC | Age: 63
End: 2023-05-12
Payer: COMMERCIAL

## 2023-05-12 VITALS
HEIGHT: 67 IN | RESPIRATION RATE: 16 BRPM | OXYGEN SATURATION: 97 % | BODY MASS INDEX: 29.19 KG/M2 | TEMPERATURE: 97.7 F | DIASTOLIC BLOOD PRESSURE: 88 MMHG | HEART RATE: 89 BPM | SYSTOLIC BLOOD PRESSURE: 126 MMHG | WEIGHT: 186 LBS

## 2023-05-12 DIAGNOSIS — E21.3 HYPERPARATHYROIDISM (H): ICD-10-CM

## 2023-05-12 DIAGNOSIS — I10 BENIGN ESSENTIAL HYPERTENSION: ICD-10-CM

## 2023-05-12 DIAGNOSIS — Z00.00 ROUTINE GENERAL MEDICAL EXAMINATION AT A HEALTH CARE FACILITY: Primary | ICD-10-CM

## 2023-05-12 DIAGNOSIS — E78.5 HYPERLIPIDEMIA, UNSPECIFIED HYPERLIPIDEMIA TYPE: ICD-10-CM

## 2023-05-12 DIAGNOSIS — Z12.31 ENCOUNTER FOR SCREENING MAMMOGRAM FOR BREAST CANCER: ICD-10-CM

## 2023-05-12 PROBLEM — Z90.710 S/P HYSTERECTOMY: Status: ACTIVE | Noted: 2023-05-12

## 2023-05-12 PROBLEM — J45.20 MILD INTERMITTENT ASTHMA WITHOUT COMPLICATION: Status: ACTIVE | Noted: 2017-04-04

## 2023-05-12 LAB
ALBUMIN SERPL BCG-MCNC: 4.7 G/DL (ref 3.5–5.2)
ALP SERPL-CCNC: 82 U/L (ref 35–104)
ALT SERPL W P-5'-P-CCNC: 52 U/L (ref 10–35)
ANION GAP SERPL CALCULATED.3IONS-SCNC: 12 MMOL/L (ref 7–15)
AST SERPL W P-5'-P-CCNC: 41 U/L (ref 10–35)
BILIRUB SERPL-MCNC: 0.7 MG/DL
BUN SERPL-MCNC: 18.7 MG/DL (ref 8–23)
CALCIUM SERPL-MCNC: 10.4 MG/DL (ref 8.8–10.2)
CHLORIDE SERPL-SCNC: 103 MMOL/L (ref 98–107)
CHOLEST SERPL-MCNC: 235 MG/DL
CREAT SERPL-MCNC: 0.77 MG/DL (ref 0.51–0.95)
CREAT UR-MCNC: 138 MG/DL
DEPRECATED HCO3 PLAS-SCNC: 24 MMOL/L (ref 22–29)
GFR SERPL CREATININE-BSD FRML MDRD: 87 ML/MIN/1.73M2
GLUCOSE SERPL-MCNC: 111 MG/DL (ref 70–99)
HDLC SERPL-MCNC: 47 MG/DL
LDLC SERPL CALC-MCNC: ABNORMAL MG/DL
LDLC SERPL DIRECT ASSAY-MCNC: 120 MG/DL
MICROALBUMIN UR-MCNC: <12 MG/L
MICROALBUMIN/CREAT UR: NORMAL MG/G{CREAT}
NONHDLC SERPL-MCNC: 188 MG/DL
POTASSIUM SERPL-SCNC: 4.3 MMOL/L (ref 3.4–5.3)
PROT SERPL-MCNC: 8.3 G/DL (ref 6.4–8.3)
PTH-INTACT SERPL-MCNC: 55 PG/ML (ref 15–65)
SODIUM SERPL-SCNC: 139 MMOL/L (ref 136–145)
TRIGL SERPL-MCNC: 439 MG/DL

## 2023-05-12 PROCEDURE — 36415 COLL VENOUS BLD VENIPUNCTURE: CPT | Performed by: NURSE PRACTITIONER

## 2023-05-12 PROCEDURE — 82306 VITAMIN D 25 HYDROXY: CPT | Performed by: NURSE PRACTITIONER

## 2023-05-12 PROCEDURE — 80053 COMPREHEN METABOLIC PANEL: CPT | Performed by: NURSE PRACTITIONER

## 2023-05-12 PROCEDURE — 82043 UR ALBUMIN QUANTITATIVE: CPT | Performed by: NURSE PRACTITIONER

## 2023-05-12 PROCEDURE — 99214 OFFICE O/P EST MOD 30 MIN: CPT | Mod: 25 | Performed by: NURSE PRACTITIONER

## 2023-05-12 PROCEDURE — 80061 LIPID PANEL: CPT | Performed by: NURSE PRACTITIONER

## 2023-05-12 PROCEDURE — 83721 ASSAY OF BLOOD LIPOPROTEIN: CPT | Mod: 59 | Performed by: NURSE PRACTITIONER

## 2023-05-12 PROCEDURE — 83970 ASSAY OF PARATHORMONE: CPT | Performed by: NURSE PRACTITIONER

## 2023-05-12 PROCEDURE — 82570 ASSAY OF URINE CREATININE: CPT | Performed by: NURSE PRACTITIONER

## 2023-05-12 PROCEDURE — 99396 PREV VISIT EST AGE 40-64: CPT | Performed by: NURSE PRACTITIONER

## 2023-05-12 RX ORDER — LOSARTAN POTASSIUM AND HYDROCHLOROTHIAZIDE 12.5; 5 MG/1; MG/1
1 TABLET ORAL DAILY
Qty: 90 TABLET | Refills: 3 | Status: SHIPPED | OUTPATIENT
Start: 2023-05-12

## 2023-05-12 RX ORDER — ATORVASTATIN CALCIUM 40 MG/1
40 TABLET, FILM COATED ORAL DAILY
Qty: 90 TABLET | Refills: 3 | Status: SHIPPED | OUTPATIENT
Start: 2023-05-12 | End: 2024-06-06

## 2023-05-12 ASSESSMENT — ENCOUNTER SYMPTOMS
BREAST MASS: 0
SORE THROAT: 0
DYSURIA: 0
JOINT SWELLING: 1
HEADACHES: 1
CHILLS: 0
SHORTNESS OF BREATH: 0
MYALGIAS: 1
HEMATOCHEZIA: 0
PALPITATIONS: 0
COUGH: 0
CONSTIPATION: 0
ARTHRALGIAS: 1
WEAKNESS: 0
HEMATURIA: 0
DIARRHEA: 0
ABDOMINAL PAIN: 0
NERVOUS/ANXIOUS: 0
EYE PAIN: 1
NAUSEA: 0
FEVER: 0
PARESTHESIAS: 0
DIZZINESS: 0
FREQUENCY: 0
HEARTBURN: 0

## 2023-05-12 NOTE — PROGRESS NOTES
SUBJECTIVE:   CC: Ophelia is an 62 year old who presents for preventive health visit.       5/12/2023     7:51 AM   Additional Questions   Roomed by Sharlene LAWLER   Patient has been advised of split billing requirements and indicates understanding: Yes  Healthy Habits:     Getting at least 3 servings of Calcium per day:  NO    Bi-annual eye exam:  Yes    Dental care twice a year:  Yes    Sleep apnea or symptoms of sleep apnea:  None    Diet:  Regular (no restrictions)    Frequency of exercise:  2-3 days/week    Duration of exercise:  30-45 minutes    Taking medications regularly:  Yes    Medication side effects:  None    PHQ-2 Total Score: 1    Additional concerns today:  No      Social:  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    3 children (ages 35, 31, and 29), 2 grandchildren (4 and 2).      Mother with COPD/lung cancer and Father with dementia, stroke history.       Asthma       3/11/2021     7:13 AM 5/11/2022     8:48 AM 5/8/2023    12:41 PM   ACT Total Scores   ACT TOTAL SCORE (Goal Greater than or Equal to 20) 24 25 25   In the past 12 months, how many times did you visit the emergency room for your asthma without being admitted to the hospital? 0 0 0   In the past 12 months, how many times were you hospitalized overnight because of your asthma? 0 0 0      Hyperparathyroidism  Last seen by Dr. Best in 2018.     Hyperlipidemia Follow-Up  Recent Labs   Lab Test 11/17/22  1036 05/11/22  0934   CHOL 249* 235*   HDL 51 50   * 147*   TRIG 487* 458*         Are you regularly taking any medication or supplement to lower your cholesterol?   Yes- Lipitor    Are you having muscle aches or other side effects that you think could be caused by your cholesterol lowering medication?  No    Hypertension Follow-up      Do you check your blood pressure regularly outside of the clinic? Yes     Are you following a low salt diet? Yes    Are your blood pressures ever more than 140 on the top  number (systolic) OR more   than 90 on the bottom number (diastolic), for example 140/90? sometimes      Today's PHQ-2 Score:       2023    12:38 PM   PHQ-2 (  [employer])   Q1: Little interest or pleasure in doing things 1   Q2: Feeling down, depressed or hopeless 0   PHQ-2 Score 1   Q1: Little interest or pleasure in doing things Several days    Several days   Q2: Feeling down, depressed or hopeless Several days    Not at all   PHQ-2 Score 2    1           Social History     Tobacco Use     Smoking status: Never     Smokeless tobacco: Never   Vaping Use     Vaping status: Not on file   Substance Use Topics     Alcohol use: Yes     Comment: Typically 1-2 per week about 2 drinks             2023    12:38 PM   Alcohol Use   Prescreen: >3 drinks/day or >7 drinks/week? No     Reviewed orders with patient.  Reviewed health maintenance and updated orders accordingly - Yes  BP Readings from Last 3 Encounters:   23 126/88   22 (!) 140/90   22 (!) 152/92    Wt Readings from Last 3 Encounters:   23 84.4 kg (186 lb)   22 83.5 kg (184 lb)   22 87.5 kg (192 lb 12.8 oz)                  Patient Active Problem List   Diagnosis     Moderate persistent asthma without complication     Anxiety     Benign essential hypertension     Dermatitis     Neoplasm of uncertain behavior     Seborrheic keratosis     Multiple nevi     Solar lentiginosis     History of arteriovenous malformation     Mixed hyperlipidemia     Hyperparathyroidism (H)     Past Surgical History:   Procedure Laterality Date     BREAST SURGERY      duct removal -       SECTION           ENT SURGERY  3/4/2018     HYSTERECTOMY      no cervix     HYSTERECTOMY, PAP NO LONGER INDICATED       SINUS SURGERY      deviated septum & polyp removal     TEMPORAL ARTERY LIGATN OR BX      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      done at [facility]       Social History     Tobacco Use     Smoking status: Never      Smokeless tobacco: Never   Vaping Use     Vaping status: Not on file   Substance Use Topics     Alcohol use: Yes     Comment: Typically 1-2 per week about 2 drinks     Family History   Problem Relation Age of Onset     Hypertension Father      Hyperlipidemia Father      Cerebrovascular Disease Father      Hyperlipidemia Sister      Hyperlipidemia Sister      Depression Sister      Anxiety Disorder Sister      Hypertension Brother      Hyperlipidemia Brother      Asthma Son      Colon Cancer Paternal Grandfather          Current Outpatient Medications   Medication Sig Dispense Refill     atorvastatin (LIPITOR) 40 MG tablet Take 1 tablet (40 mg) by mouth daily 90 tablet 3     DUPIXENT 300 MG/2ML SOPN        losartan-hydrochlorothiazide (HYZAAR) 50-12.5 MG tablet Take 1 tablet by mouth daily 90 tablet 3     Multiple Vitamins-Minerals (MULTIVITAMIN PO)        Omega-3 Fatty Acids (FISH OIL PO)        VITAMIN D, CHOLECALCIFEROL, PO Take by mouth daily       albuterol (PROAIR HFA/PROVENTIL HFA/VENTOLIN HFA) 108 (90 BASE) MCG/ACT Inhaler Inhale 2 puffs into the lungs every 6 hours 1 Inhaler 3     fluticasone (FLONASE) 50 MCG/ACT spray Spray 1 spray into both nostrils daily       loratadine (CLARITIN) 10 MG tablet Take 1 tablet (10 mg) by mouth daily         Breast Cancer Screening:    FHS-7:       3/11/2021     6:56 AM 5/11/2022     8:09 AM 5/24/2022     7:46 AM 5/8/2023    12:42 PM   Breast CA Risk Assessment (FHS-7)   Did any of your first-degree relatives have breast or ovarian cancer? No Yes No No   Did any of your relatives have bilateral breast cancer? No No No No   Did any man in your family have breast cancer? No No No No   Did any woman in your family have breast and ovarian cancer? No No  No   Did any woman in your family have breast cancer before age 50 y? No No No No   Do you have 2 or more relatives with breast and/or ovarian cancer? Yes No No No   Do you have 2 or more relatives with breast and/or bowel  cancer? No No No No       Mammogram Screening: Recommended mammography every 1-2 years with patient discussion and risk factor consideration  Pertinent mammograms are reviewed under the imaging tab.    History of abnormal Pap smear: Status post benign hysterectomy. Health Maintenance and Surgical History updated.     Reviewed and updated as needed this visit by clinical staff   Tobacco  Allergies  Meds  Problems  Med Hx  Surg Hx  Fam Hx          Reviewed and updated as needed this visit by Provider                 Past Medical History:   Diagnosis Date     Acid reflux      Hyperparathyroidism (H) 10/22/2018     Hypertension      Thyroid disease      Uncomplicated asthma       Past Surgical History:   Procedure Laterality Date     BREAST SURGERY      duct removal -       SECTION           ENT SURGERY  3/4/2018     HYSTERECTOMY      no cervix     HYSTERECTOMY, PAP NO LONGER INDICATED       SINUS SURGERY      deviated septum & polyp removal     TEMPORAL ARTERY LIGATN OR BX      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   HENT: Positive for congestion. Negative for ear pain, hearing loss and sore throat.    Eyes: Positive for pain. Negative for visual disturbance.   Respiratory: Negative for cough and shortness of breath.    Cardiovascular: Positive for peripheral edema. Negative for chest pain and palpitations.   Gastrointestinal: Negative for abdominal pain, constipation, diarrhea, heartburn, hematochezia and nausea.   Breasts:  Negative for tenderness, breast mass and discharge.   Genitourinary: Negative for dysuria, frequency, genital sores, hematuria, pelvic pain, urgency, vaginal bleeding and vaginal discharge.   Musculoskeletal: Positive for arthralgias, joint swelling and myalgias.   Skin: Negative for rash.   Neurological: Positive for headaches. Negative for dizziness, weakness and  "paresthesias.   Psychiatric/Behavioral: Negative for mood changes. The patient is not nervous/anxious.           OBJECTIVE:   /88   Pulse 89   Temp 97.7  F (36.5  C) (Tympanic)   Resp 16   Ht 1.702 m (5' 7\")   Wt 84.4 kg (186 lb)   SpO2 97%   BMI 29.13 kg/m       Physical Exam     GENERAL: healthy, alert and no distress  EYES: Eyes grossly normal to inspection  HENT: ear canals and TM's normal, nose and mouth without ulcers or lesions  NECK: no adenopathy, no asymmetry, masses, or scars and thyroid normal to palpation  RESP: lungs clear to auscultation - no rales, rhonchi or wheezes  CV: regular rate and rhythm, normal S1 S2, no S3 or S4, no murmur, click or rub, no peripheral edema  ABDOMEN: soft, nontender, no hepatosplenomegaly, no masses and bowel sounds normal  MS: no gross musculoskeletal defects noted, no edema  SKIN: no suspicious lesions or rashes  NEURO: Normal strength and tone, mentation intact and speech normal  PSYCH: mentation appears normal, affect normal/bright      ASSESSMENT/PLAN:     Ophelia was seen today for physical.    Diagnoses and all orders for this visit:    Routine general medical examination at a health care facility    Hyperparathyroidism (H)  Surveillance labs, last seen by Dr. Sandhu in 2018.    -     Vitamin D Deficiency  -     Parathyroid Hormone Intact    Hyperlipidemia, unspecified hyperlipidemia type  Due for fasting lipids for monitoring.    -     atorvastatin (LIPITOR) 40 MG tablet; Take 1 tablet (40 mg) by mouth daily  -     Lipid panel reflex to direct LDL Fasting    Benign essential hypertension  Stable on antihypertensive regimen.    -     losartan-hydrochlorothiazide (HYZAAR) 50-12.5 MG tablet; Take 1 tablet by mouth daily  -     Albumin Random Urine Quantitative with Creat Ratio  -     Comprehensive metabolic panel (BMP + Alb, Alk Phos, ALT, AST, Total. Bili, TP)    Encounter for screening mammogram for breast cancer  -     *MA Screening Digital Bilateral; " Future      Other orders  -     REVIEW OF HEALTH MAINTENANCE PROTOCOL ORDERS          COUNSELING:  Reviewed preventive health counseling, as reflected in patient instructions        She reports that she has never smoked. She has never used smokeless tobacco.      KENNEDY Stock CNP  [facility] [place]

## 2023-05-13 LAB — DEPRECATED CALCIDIOL+CALCIFEROL SERPL-MC: 46 UG/L (ref 20–75)

## 2023-05-17 DIAGNOSIS — E78.5 HYPERLIPIDEMIA, UNSPECIFIED HYPERLIPIDEMIA TYPE: Primary | ICD-10-CM

## 2023-05-17 DIAGNOSIS — E83.52 SERUM CALCIUM ELEVATED: ICD-10-CM

## 2023-05-17 RX ORDER — EZETIMIBE 10 MG/1
10 TABLET ORAL DAILY
Qty: 90 TABLET | Refills: 3 | Status: SHIPPED | OUTPATIENT
Start: 2023-05-17 | End: 2024-06-06

## 2023-05-17 NOTE — RESULT ENCOUNTER NOTE
Dear Ophelia,     -LDL(bad) cholesterol level is elevated, HDL(good) cholesterol level is low and your triglycerides are elevated which can increase your heart disease risk.  A diet high in fat and simple carbohydrates, genetics and being overweight can contribute to this. ADVISE: continuing Atorvastatin 40 mg and ADDING Zetia, which is an additional cholesterol medication to help lower your levels, exercising 150 minutes of aerobic exercise per week (30 minutes for 5 days per week or 50 minutes for 3 days per week are options), eating a low saturated fat/low carbohydrate diet, and omega-3 fatty acids (fish oil) 1471-3816 mg daily are helpful to improve this. In 12 months, you should recheck your fasting cholesterol panel by scheduling a lab-only appointment.      -Liver tests (ALT and AST) are slightly elevated.  Recommend rechecking these in 1-2 months.    -Kidney function (GFR) is normal.  -Sodium is normal.  -Potassium is normal.  -Calcium is elevated.  ADVISE: rechecking this in 1-2 months.  -Glucose is slightly elevated and may be a sign of early diabetes (prediabetes). ADVISE: eating a low carbohydrate diet, exercising, trying to lose weight (if necessary) and rechecking your glucose level in 12 months.  -Microalbumin (urine protein) test is normal.  ADVISE: rechecking this annually.  -Vitamin D level is within a normal range.    PTH (parathyroid hormone) is within a normal range and reassuring.      Please send a [employer] message or call [phone]  if you have any questions.      Syeda Mars, KENNEDY, CNP  [facility] [place]    If you have further questions about the interpretation of your labs, [email or web address] is a good website to check out for further information.

## 2023-05-21 ENCOUNTER — APPOINTMENT (OUTPATIENT)
Dept: GENERAL RADIOLOGY | Facility: CLINIC | Age: 63
End: 2023-05-21
Attending: EMERGENCY MEDICINE
Payer: COMMERCIAL

## 2023-05-21 ENCOUNTER — HOSPITAL ENCOUNTER (EMERGENCY)
Facility: CLINIC | Age: 63
Discharge: HOME | End: 2023-05-21
Attending: EMERGENCY MEDICINE | Admitting: EMERGENCY MEDICINE
Payer: COMMERCIAL

## 2023-05-21 VITALS
WEIGHT: 184 LBS | HEIGHT: 67 IN | HEART RATE: 90 BPM | BODY MASS INDEX: 28.88 KG/M2 | TEMPERATURE: 97.4 F | RESPIRATION RATE: 18 BRPM | OXYGEN SATURATION: 100 % | SYSTOLIC BLOOD PRESSURE: 138 MMHG | DIASTOLIC BLOOD PRESSURE: 91 MMHG

## 2023-05-21 DIAGNOSIS — M25.562 ACUTE PAIN OF LEFT KNEE: ICD-10-CM

## 2023-05-21 PROCEDURE — 73562 X-RAY EXAM OF KNEE 3: CPT | Mod: LT

## 2023-05-21 PROCEDURE — 250N000013 HC RX MED GY IP 250 OP 250 PS 637: Performed by: EMERGENCY MEDICINE

## 2023-05-21 PROCEDURE — 99284 EMERGENCY DEPT VISIT MOD MDM: CPT

## 2023-05-21 RX ORDER — ACETAMINOPHEN 325 MG/1
650 TABLET ORAL ONCE
Status: COMPLETED | OUTPATIENT
Start: 2023-05-21 | End: 2023-05-21

## 2023-05-21 RX ORDER — IBUPROFEN 600 MG/1
600 TABLET, FILM COATED ORAL ONCE
Status: COMPLETED | OUTPATIENT
Start: 2023-05-21 | End: 2023-05-21

## 2023-05-21 RX ADMIN — IBUPROFEN 600 MG: 600 TABLET, FILM COATED ORAL at 08:46

## 2023-05-21 RX ADMIN — ACETAMINOPHEN 650 MG: 325 TABLET ORAL at 08:46

## 2023-05-21 ASSESSMENT — ACTIVITIES OF DAILY LIVING (ADL): ADLS_ACUITY_SCORE: 36

## 2023-05-21 NOTE — ED PROVIDER NOTES
"  History     Chief Complaint:  Knee Pain       HPI   Ophelia Caal is a 62 year old female with a history of gout who presents to the ED for evaluation of knee pain. Patient reports onset of left-sided medial knee pain that began last night. Pain has worsened since onset. No falls or trauma. Patient pinpoint to medial aspect. No numbness or tingling. She notes pain is worst with weight bearing and seems to radiate up leg. She has taken Tylenol for her symptoms. No fevers or chills. No rash. No leg swelling. She notes that she has not done any activities out of the ordinary.     Independent Historian:   None - Patient Only    Review of External Notes:   None    Medications:    Proair  Lipitor  Zetia  Flonase  Claritin  Hyzaar    Past Medical History:    Hyperparathyroidism  Hypertension   Thyroid disease  Asthma   Anxiety  GERD  Seborrheic keratosis    Past Surgical History:    Breast surgery, duct removal   section  ENT surgery  Hysterectomy  Sinus surgery, deviated septum & polyp removal   Temporal artery ligation & removal       Physical Exam     Patient Vitals for the past 24 hrs:   BP Temp Temp src Pulse Resp SpO2 Height Weight   23 0832 (!) 138/91 97.4  F (36.3  C) Temporal 90 18 100 % 1.702 m (5' 7\") 83.5 kg (184 lb)        Physical Exam  HENT: mmm  Eyes: PERRL B/L  Neck: Supple  CV: Peripheral pulses in tact and regular  Resp: Speaking in full sentences without any respiratory distress  Ext:  Left lower extremity  TTP to medial joint line of knee. No bony TTP.  Full ROM of extremity, including at knee and hip.  2+ PT pulse.  Sensation intact distally.  No swelling noted.  Remainder of the skeletal survey is unremarkable  Skin: warm dry well perfused  Neuro: Alert, no gross motor or sensory deficits  Psych: Calm  Nursing notes and vital signs reviewed.      Emergency Department Course     Imaging:  XR Knee Left 3 Views   Preliminary Result   IMPRESSION: Anatomic alignment left knee. No acute " displaced left knee fracture. Mild medial and patellofemoral compartment left knee osteoarthritis with medial and lateral compartment chondrocalcinosis. No left knee joint effusion. No anterior left knee    soft tissue swelling.            Report per radiology    Emergency Department Course & Assessments:    Interventions:  Medications   acetaminophen (TYLENOL) tablet 650 mg (650 mg Oral $Given 5/21/23 0846)   ibuprofen (ADVIL/MOTRIN) tablet 600 mg (600 mg Oral $Given 5/21/23 0846)        Independent Interpretation (X-rays, CTs, rhythm strip):  I personally reviewed the XR, no obvious fracture.    Assessments/Consultations/Discussion of Management or Tests:   None    Social Determinants of Health affecting care:   None    Disposition:  The patient was discharged to home.     Impression & Plan      Medical Decision Making:  Ophelia Caal is a 62 year old female who presents to the ED for evaluation of left knee pain. See HPI as above for additional details. Vitals and physical exam as above. DDx was broad and included fracture, sprain, strain, crystal arthropathy such as gout, septic arthritis, cellulitis, amongst others. Greatest suspicion at this time for ligamentous issue given pinpoint tenderness to joint line. No warmth, redness, swelling to suggest for septic arthritis. No bony abnormality. Lower suspicion for gout as no erythema, warmth, no swelling, although this remains a possibility. Plan for ACE wrap, crutches, f/u with orthopedics. [place] patient was safe for discharge to home. Referral for orthopedics. Discussed reasons to return. All questions answered. Patient discharged to home in stable condition.    Diagnosis:    ICD-10-CM    1. Acute pain of left knee  M25.562          Discharge Medications:  New Prescriptions    No medications on file      Scribe Disclosure:  PÉREZ HENRIQUEZ, am serving as a scribe at 10:23 AM on 5/21/2023 to document services personally performed by Austin Turcios PA-C based on my  observations and the provider's statements to me.    This record was created at least in part using electronic voice recognition software, so please excuse any typographical errors.       Austin Turcios PA-C  05/21/23 0990

## 2023-05-21 NOTE — ED TRIAGE NOTES
Pt arrives in wheelchair with spouse for left knee pain. Pt was limping last night and is unable to weight bear this morning. Took tylenol and tumeric last night with no relief. Pain is medial on knee is a 2/10 when sitting but 10/10 when trying to weight bear. No known trauma or injury.

## 2023-06-02 ENCOUNTER — DOCUMENTATION ONLY (OUTPATIENT)
Dept: HOME HEALTH SERVICES | Facility: CLINIC | Age: 63
End: 2023-06-02
Payer: COMMERCIAL

## 2023-06-02 DIAGNOSIS — M25.562 ACUTE PAIN OF LEFT KNEE: Primary | ICD-10-CM

## 2023-07-12 ENCOUNTER — ANCILLARY PROCEDURE (OUTPATIENT)
Dept: MAMMOGRAPHY | Facility: CLINIC | Age: 63
End: 2023-07-12
Attending: NURSE PRACTITIONER
Payer: COMMERCIAL

## 2023-07-12 DIAGNOSIS — Z12.31 VISIT FOR SCREENING MAMMOGRAM: ICD-10-CM

## 2023-07-12 PROCEDURE — 77067 SCR MAMMO BI INCL CAD: CPT | Mod: TC | Performed by: RADIOLOGY

## 2023-07-12 PROCEDURE — 77063 BREAST TOMOSYNTHESIS BI: CPT | Mod: TC | Performed by: RADIOLOGY

## 2023-11-08 ENCOUNTER — LAB (OUTPATIENT)
Dept: LAB | Facility: CLINIC | Age: 63
End: 2023-11-08
Payer: COMMERCIAL

## 2023-11-08 ENCOUNTER — APPOINTMENT (OUTPATIENT)
Dept: URBAN - METROPOLITAN AREA CLINIC 253 | Age: 63
Setting detail: DERMATOLOGY
End: 2023-11-09

## 2023-11-08 VITALS — HEIGHT: 67 IN | WEIGHT: 175 LBS

## 2023-11-08 DIAGNOSIS — D18.0 HEMANGIOMA: ICD-10-CM

## 2023-11-08 DIAGNOSIS — E78.5 HYPERLIPIDEMIA, UNSPECIFIED HYPERLIPIDEMIA TYPE: ICD-10-CM

## 2023-11-08 DIAGNOSIS — L82.1 OTHER SEBORRHEIC KERATOSIS: ICD-10-CM

## 2023-11-08 DIAGNOSIS — D49.2 NEOPLASM OF UNSPECIFIED BEHAVIOR OF BONE, SOFT TISSUE, AND SKIN: ICD-10-CM

## 2023-11-08 DIAGNOSIS — L81.4 OTHER MELANIN HYPERPIGMENTATION: ICD-10-CM

## 2023-11-08 DIAGNOSIS — Z71.89 OTHER SPECIFIED COUNSELING: ICD-10-CM

## 2023-11-08 DIAGNOSIS — Z41.9 ENCOUNTER FOR PROCEDURE FOR PURPOSES OTHER THAN REMEDYING HEALTH STATE, UNSPECIFIED: ICD-10-CM

## 2023-11-08 DIAGNOSIS — D22 MELANOCYTIC NEVI: ICD-10-CM

## 2023-11-08 PROBLEM — D22.5 MELANOCYTIC NEVI OF TRUNK: Status: ACTIVE | Noted: 2023-11-08

## 2023-11-08 PROBLEM — D18.01 HEMANGIOMA OF SKIN AND SUBCUTANEOUS TISSUE: Status: ACTIVE | Noted: 2023-11-08

## 2023-11-08 LAB
CHOLEST SERPL-MCNC: 103 MG/DL
HDLC SERPL-MCNC: 33 MG/DL
LDLC SERPL CALC-MCNC: 46 MG/DL
NONHDLC SERPL-MCNC: 70 MG/DL
TRIGL SERPL-MCNC: 121 MG/DL

## 2023-11-08 PROCEDURE — 11103 TANGNTL BX SKIN EA SEP/ADDL: CPT

## 2023-11-08 PROCEDURE — OTHER ADDITIONAL NOTES: OTHER

## 2023-11-08 PROCEDURE — OTHER COUNSELING: OTHER

## 2023-11-08 PROCEDURE — 80061 LIPID PANEL: CPT

## 2023-11-08 PROCEDURE — 11102 TANGNTL BX SKIN SINGLE LES: CPT

## 2023-11-08 PROCEDURE — OTHER MIPS QUALITY: OTHER

## 2023-11-08 PROCEDURE — OTHER BIOPSY BY SHAVE METHOD: OTHER

## 2023-11-08 PROCEDURE — 36415 COLL VENOUS BLD VENIPUNCTURE: CPT

## 2023-11-08 PROCEDURE — 99213 OFFICE O/P EST LOW 20 MIN: CPT | Mod: 25

## 2023-11-08 ASSESSMENT — LOCATION SIMPLE DESCRIPTION DERM
LOCATION SIMPLE: RIGHT LOWER BACK
LOCATION SIMPLE: LOWER BACK
LOCATION SIMPLE: LEFT ANTERIOR NECK
LOCATION SIMPLE: UPPER BACK

## 2023-11-08 ASSESSMENT — LOCATION DETAILED DESCRIPTION DERM
LOCATION DETAILED: INFERIOR THORACIC SPINE
LOCATION DETAILED: LEFT INFERIOR LATERAL NECK
LOCATION DETAILED: RIGHT INFERIOR LATERAL MIDBACK
LOCATION DETAILED: SUPERIOR LUMBAR SPINE

## 2023-11-08 ASSESSMENT — LOCATION ZONE DERM
LOCATION ZONE: NECK
LOCATION ZONE: TRUNK

## 2023-11-08 NOTE — PROCEDURE: BIOPSY BY SHAVE METHOD
Detail Level: Detailed
Depth Of Biopsy: dermis
Was A Bandage Applied: Yes
Size Of Lesion In Cm: 0
Biopsy Type: H and E
Biopsy Method: Dermablade
Anesthesia Type: 1% lidocaine with epinephrine and a 1:10 solution of 8.4% sodium bicarbonate
Anesthesia Volume In Cc (Will Not Render If 0): 0.5
Hemostasis: Drysol
Wound Care: Petrolatum
Dressing: bandage
Destruction After The Procedure: No
Type Of Destruction Used: Curettage
Curettage Text: The wound bed was treated with curettage after the biopsy was performed.
Cryotherapy Text: The wound bed was treated with cryotherapy after the biopsy was performed.
Electrodesiccation Text: The wound bed was treated with electrodesiccation after the biopsy was performed.
Electrodesiccation And Curettage Text: The wound bed was treated with electrodesiccation and curettage after the biopsy was performed.
Silver Nitrate Text: The wound bed was treated with silver nitrate after the biopsy was performed.
Lab: -[id number]
Consent: Written consent was obtained and risks were reviewed including but not limited to scarring, infection, bleeding, scabbing, incomplete removal, nerve damage and allergy to anesthesia.
Post-Care Instructions: I reviewed with the patient in detail post-care instructions. Patient is to keep the biopsy site dry overnight, and then apply bacitracin twice daily until healed. Patient may apply hydrogen peroxide soaks to remove any crusting.
Notification Instructions: Patient will be notified of biopsy results. However, patient instructed to call the office if not contacted within 2 weeks.
Billing Type: Third-Party Bill
Information: Selecting Yes will display possible errors in your note based on the variables you have selected. This validation is only offered as a suggestion for you. PLEASE NOTE THAT THE VALIDATION TEXT WILL BE REMOVED WHEN YOU FINALIZE YOUR NOTE. IF YOU WANT TO FAX A PRELIMINARY NOTE YOU WILL NEED TO TOGGLE THIS TO 'NO' IF YOU DO NOT WANT IT IN YOUR FAXED NOTE.
Body Location Override (Optional - Billing Will Still Be Based On Selected Body Map Location If Applicable): right medial low back

## 2023-11-08 NOTE — HPI: FULL BODY SKIN EXAMINATION
How Severe Are Your Spot(S)?: mild
What Type Of Note Output Would You Prefer (Optional)?: Standard Output
What Is The Reason For Today's Visit?: Full Body Skin Examination
What Is The Reason For Today's Visit? (Being Monitored For X): concerning skin lesions on an annual basis
Additional History: Concern of possible scab on upper back.

## 2023-11-08 NOTE — PROCEDURE: ADDITIONAL NOTES
Detail Level: Simple
Render Risk Assessment In Note?: no
Additional Notes: Patient asked about drooping eyelids. Discussed Botox to raise eyebrows may help give her lift. But discussed most definitive treatment would be a blepharoplasty

## 2023-11-10 NOTE — RESULT ENCOUNTER NOTE
Dear Ophelia,     -Cholesterol levels are at your goal levels.  ADVISE: continuing your medication, a regular exercise program with at least 150 minutes of aerobic exercise per week, and eating a low saturated fat/low carbohydrate diet.  Also, you should recheck this fasting cholesterol panel in 12 months.      Please send a [employer] message or call [phone]  if you have any questions.      Syeda Mars, KENNEDY, CNP  [facility] [place]    If you have further questions about the interpretation of your labs, [email or web address] is a good website to check out for further information.

## 2023-11-30 ENCOUNTER — APPOINTMENT (OUTPATIENT)
Dept: URBAN - METROPOLITAN AREA CLINIC 253 | Age: 63
Setting detail: DERMATOLOGY
End: 2023-11-30

## 2023-11-30 VITALS — HEIGHT: 67 IN | WEIGHT: 172 LBS | RESPIRATION RATE: 14 BRPM

## 2023-11-30 VITALS — HEIGHT: 67 IN | WEIGHT: 172 LBS

## 2023-11-30 DIAGNOSIS — D22 MELANOCYTIC NEVI: ICD-10-CM

## 2023-11-30 PROBLEM — D22.4 MELANOCYTIC NEVI OF SCALP AND NECK: Status: ACTIVE | Noted: 2023-11-30

## 2023-11-30 PROBLEM — D22.5 MELANOCYTIC NEVI OF TRUNK: Status: ACTIVE | Noted: 2023-11-30

## 2023-11-30 PROCEDURE — 11402 EXC TR-EXT B9+MARG 1.1-2 CM: CPT

## 2023-11-30 PROCEDURE — 11422 EXC H-F-NK-SP B9+MARG 1.1-2: CPT

## 2023-11-30 PROCEDURE — OTHER EXCISION: OTHER

## 2023-11-30 ASSESSMENT — LOCATION DETAILED DESCRIPTION DERM
LOCATION DETAILED: RIGHT SUPERIOR MEDIAL LOWER BACK
LOCATION DETAILED: LEFT INFERIOR LATERAL NECK

## 2023-11-30 ASSESSMENT — LOCATION ZONE DERM
LOCATION ZONE: TRUNK
LOCATION ZONE: NECK

## 2023-11-30 ASSESSMENT — LOCATION SIMPLE DESCRIPTION DERM
LOCATION SIMPLE: RIGHT LOWER BACK
LOCATION SIMPLE: LEFT ANTERIOR NECK

## 2023-11-30 NOTE — PROCEDURE: EXCISION

## 2023-12-05 ENCOUNTER — APPOINTMENT (OUTPATIENT)
Dept: URBAN - METROPOLITAN AREA CLINIC 253 | Age: 63
Setting detail: DERMATOLOGY
End: 2023-12-28

## 2023-12-05 DIAGNOSIS — Z48.817 ENCOUNTER FOR SURGICAL AFTERCARE FOLLOWING SURGERY ON THE SKIN AND SUBCUTANEOUS TISSUE: ICD-10-CM

## 2023-12-05 PROCEDURE — OTHER PHOTO-DOCUMENTATION: OTHER

## 2023-12-05 PROCEDURE — OTHER PRESCRIPTION: OTHER

## 2023-12-05 PROCEDURE — OTHER COUNSELING: OTHER

## 2023-12-05 PROCEDURE — OTHER ADDITIONAL NOTES: OTHER

## 2023-12-05 RX ORDER — GENTAMICIN SULFATE 1 MG/G
0.1% OINTMENT TOPICAL
Qty: 15 | Refills: 0 | Status: ERX | COMMUNITY
Start: 2023-12-05

## 2023-12-05 ASSESSMENT — LOCATION SIMPLE DESCRIPTION DERM: LOCATION SIMPLE: NECK

## 2023-12-05 ASSESSMENT — LOCATION DETAILED DESCRIPTION DERM: LOCATION DETAILED: LEFT CENTRAL LATERAL NECK

## 2023-12-05 ASSESSMENT — LOCATION ZONE DERM: LOCATION ZONE: NECK

## 2023-12-05 NOTE — PROCEDURE: ADDITIONAL NOTES
Additional Notes: The patient is advised to apply warm compresses once daily and use a small amount of hydrogen peroxide to clean the site once daily.
Detail Level: Simple
Render Risk Assessment In Note?: no

## 2024-04-12 ENCOUNTER — PATIENT OUTREACH (OUTPATIENT)
Dept: CARE COORDINATION | Facility: CLINIC | Age: 64
End: 2024-04-12
Payer: COMMERCIAL

## 2024-04-26 ENCOUNTER — PATIENT OUTREACH (OUTPATIENT)
Dept: CARE COORDINATION | Facility: CLINIC | Age: 64
End: 2024-04-26
Payer: COMMERCIAL

## 2024-06-05 SDOH — HEALTH STABILITY: PHYSICAL HEALTH: ON AVERAGE, HOW MANY DAYS PER WEEK DO YOU ENGAGE IN MODERATE TO STRENUOUS EXERCISE (LIKE A BRISK WALK)?: 5 DAYS

## 2024-06-05 SDOH — HEALTH STABILITY: PHYSICAL HEALTH: ON AVERAGE, HOW MANY MINUTES DO YOU ENGAGE IN EXERCISE AT THIS LEVEL?: 60 MIN

## 2024-06-05 ASSESSMENT — ASTHMA QUESTIONNAIRES
ACT_TOTALSCORE: 23
QUESTION_4 LAST FOUR WEEKS HOW OFTEN HAVE YOU USED YOUR RESCUE INHALER OR NEBULIZER MEDICATION (SUCH AS ALBUTEROL): NOT AT ALL
QUESTION_1 LAST FOUR WEEKS HOW MUCH OF THE TIME DID YOUR ASTHMA KEEP YOU FROM GETTING AS MUCH DONE AT WORK, SCHOOL OR AT HOME: NONE OF THE TIME
QUESTION_3 LAST FOUR WEEKS HOW OFTEN DID YOUR ASTHMA SYMPTOMS (WHEEZING, COUGHING, SHORTNESS OF BREATH, CHEST TIGHTNESS OR PAIN) WAKE YOU UP AT NIGHT OR EARLIER THAN USUAL IN THE MORNING: NOT AT ALL
QUESTION_5 LAST FOUR WEEKS HOW WOULD YOU RATE YOUR ASTHMA CONTROL: WELL CONTROLLED
QUESTION_2 LAST FOUR WEEKS HOW OFTEN HAVE YOU HAD SHORTNESS OF BREATH: ONCE OR TWICE A WEEK
ACT_TOTALSCORE: 23

## 2024-06-05 ASSESSMENT — SOCIAL DETERMINANTS OF HEALTH (SDOH): HOW OFTEN DO YOU GET TOGETHER WITH FRIENDS OR RELATIVES?: ONCE A WEEK

## 2024-06-06 ENCOUNTER — OFFICE VISIT (OUTPATIENT)
Dept: FAMILY MEDICINE | Facility: CLINIC | Age: 64
End: 2024-06-06
Payer: COMMERCIAL

## 2024-06-06 VITALS
BODY MASS INDEX: 28.38 KG/M2 | OXYGEN SATURATION: 98 % | WEIGHT: 180.8 LBS | TEMPERATURE: 97.5 F | HEART RATE: 94 BPM | HEIGHT: 67 IN | SYSTOLIC BLOOD PRESSURE: 138 MMHG | DIASTOLIC BLOOD PRESSURE: 84 MMHG | RESPIRATION RATE: 16 BRPM

## 2024-06-06 DIAGNOSIS — I10 BENIGN ESSENTIAL HYPERTENSION: ICD-10-CM

## 2024-06-06 DIAGNOSIS — E21.3 HYPERPARATHYROIDISM (H): ICD-10-CM

## 2024-06-06 DIAGNOSIS — J45.20 MILD INTERMITTENT ASTHMA WITHOUT COMPLICATION: ICD-10-CM

## 2024-06-06 DIAGNOSIS — Z00.00 ROUTINE GENERAL MEDICAL EXAMINATION AT A HEALTH CARE FACILITY: Primary | ICD-10-CM

## 2024-06-06 DIAGNOSIS — R10.11 RUQ ABDOMINAL PAIN: ICD-10-CM

## 2024-06-06 DIAGNOSIS — G47.00 INSOMNIA, UNSPECIFIED TYPE: ICD-10-CM

## 2024-06-06 DIAGNOSIS — E78.5 HYPERLIPIDEMIA, UNSPECIFIED HYPERLIPIDEMIA TYPE: ICD-10-CM

## 2024-06-06 LAB
ERYTHROCYTE [DISTWIDTH] IN BLOOD BY AUTOMATED COUNT: 12.5 % (ref 10–15)
HCT VFR BLD AUTO: 42.4 % (ref 35–47)
HGB BLD-MCNC: 14.3 G/DL (ref 11.7–15.7)
MCH RBC QN AUTO: 33.6 PG (ref 26.5–33)
MCHC RBC AUTO-ENTMCNC: 33.7 G/DL (ref 31.5–36.5)
MCV RBC AUTO: 100 FL (ref 78–100)
PLATELET # BLD AUTO: 180 10E3/UL (ref 150–450)
RBC # BLD AUTO: 4.26 10E6/UL (ref 3.8–5.2)
WBC # BLD AUTO: 4.2 10E3/UL (ref 4–11)

## 2024-06-06 PROCEDURE — 99396 PREV VISIT EST AGE 40-64: CPT | Mod: 25 | Performed by: NURSE PRACTITIONER

## 2024-06-06 PROCEDURE — 83970 ASSAY OF PARATHORMONE: CPT | Performed by: NURSE PRACTITIONER

## 2024-06-06 PROCEDURE — 82043 UR ALBUMIN QUANTITATIVE: CPT | Performed by: NURSE PRACTITIONER

## 2024-06-06 PROCEDURE — 80053 COMPREHEN METABOLIC PANEL: CPT | Performed by: NURSE PRACTITIONER

## 2024-06-06 PROCEDURE — 80061 LIPID PANEL: CPT | Performed by: NURSE PRACTITIONER

## 2024-06-06 PROCEDURE — 85027 COMPLETE CBC AUTOMATED: CPT | Performed by: NURSE PRACTITIONER

## 2024-06-06 PROCEDURE — 99214 OFFICE O/P EST MOD 30 MIN: CPT | Mod: 25 | Performed by: NURSE PRACTITIONER

## 2024-06-06 PROCEDURE — 90471 IMMUNIZATION ADMIN: CPT | Performed by: NURSE PRACTITIONER

## 2024-06-06 PROCEDURE — 82570 ASSAY OF URINE CREATININE: CPT | Performed by: NURSE PRACTITIONER

## 2024-06-06 PROCEDURE — 90677 PCV20 VACCINE IM: CPT | Performed by: NURSE PRACTITIONER

## 2024-06-06 PROCEDURE — 36415 COLL VENOUS BLD VENIPUNCTURE: CPT | Performed by: NURSE PRACTITIONER

## 2024-06-06 RX ORDER — TRAZODONE HYDROCHLORIDE 50 MG/1
50-100 TABLET, FILM COATED ORAL
Qty: 60 TABLET | Refills: 11 | Status: SHIPPED | OUTPATIENT
Start: 2024-06-06

## 2024-06-06 RX ORDER — ATORVASTATIN CALCIUM 40 MG/1
40 TABLET, FILM COATED ORAL DAILY
Qty: 90 TABLET | Refills: 3 | Status: SHIPPED | OUTPATIENT
Start: 2024-06-06

## 2024-06-06 RX ORDER — LOSARTAN POTASSIUM 50 MG/1
75 TABLET ORAL DAILY
Qty: 135 TABLET | Refills: 3 | Status: SHIPPED | OUTPATIENT
Start: 2024-06-06

## 2024-06-06 RX ORDER — HYDROCHLOROTHIAZIDE 12.5 MG/1
12.5 TABLET ORAL DAILY
Qty: 90 TABLET | Refills: 3 | Status: SHIPPED | OUTPATIENT
Start: 2024-06-06

## 2024-06-06 RX ORDER — LOSARTAN POTASSIUM AND HYDROCHLOROTHIAZIDE 12.5; 5 MG/1; MG/1
1 TABLET ORAL DAILY
Qty: 90 TABLET | Refills: 3 | Status: CANCELLED | OUTPATIENT
Start: 2024-06-06

## 2024-06-06 RX ORDER — EZETIMIBE 10 MG/1
10 TABLET ORAL DAILY
Qty: 90 TABLET | Refills: 3 | Status: SHIPPED | OUTPATIENT
Start: 2024-06-06

## 2024-06-06 ASSESSMENT — ANXIETY QUESTIONNAIRES
GAD7 TOTAL SCORE: 7
6. BECOMING EASILY ANNOYED OR IRRITABLE: SEVERAL DAYS
2. NOT BEING ABLE TO STOP OR CONTROL WORRYING: SEVERAL DAYS
5. BEING SO RESTLESS THAT IT IS HARD TO SIT STILL: SEVERAL DAYS
IF YOU CHECKED OFF ANY PROBLEMS ON THIS QUESTIONNAIRE, HOW DIFFICULT HAVE THESE PROBLEMS MADE IT FOR YOU TO DO YOUR WORK, TAKE CARE OF THINGS AT HOME, OR GET ALONG WITH OTHER PEOPLE: SOMEWHAT DIFFICULT
7. FEELING AFRAID AS IF SOMETHING AWFUL MIGHT HAPPEN: SEVERAL DAYS
GAD7 TOTAL SCORE: 7
GAD7 TOTAL SCORE: 7
8. IF YOU CHECKED OFF ANY PROBLEMS, HOW DIFFICULT HAVE THESE MADE IT FOR YOU TO DO YOUR WORK, TAKE CARE OF THINGS AT HOME, OR GET ALONG WITH OTHER PEOPLE?: SOMEWHAT DIFFICULT
7. FEELING AFRAID AS IF SOMETHING AWFUL MIGHT HAPPEN: SEVERAL DAYS
3. WORRYING TOO MUCH ABOUT DIFFERENT THINGS: SEVERAL DAYS
4. TROUBLE RELAXING: SEVERAL DAYS
1. FEELING NERVOUS, ANXIOUS, OR ON EDGE: SEVERAL DAYS

## 2024-06-06 ASSESSMENT — PATIENT HEALTH QUESTIONNAIRE - PHQ9
SUM OF ALL RESPONSES TO PHQ QUESTIONS 1-9: 6
SUM OF ALL RESPONSES TO PHQ QUESTIONS 1-9: 6
10. IF YOU CHECKED OFF ANY PROBLEMS, HOW DIFFICULT HAVE THESE PROBLEMS MADE IT FOR YOU TO DO YOUR WORK, TAKE CARE OF THINGS AT HOME, OR GET ALONG WITH OTHER PEOPLE: SOMEWHAT DIFFICULT

## 2024-06-06 NOTE — PATIENT INSTRUCTIONS
"Preventive Care Advice   This is general advice we often give to help people stay healthy. Your care team may have specific advice just for you. Please talk to your care team about your own preventive care needs.  Lifestyle  Exercise at least 150 minutes each week (30 minutes a day, 5 days a week).  Do muscle strengthening activities 2 days a week. These help control your weight and prevent disease.  No smoking.  Wear sunscreen to prevent skin cancer.  Have your home tested for radon every 2 to 5 years. Radon is a colorless, odorless gas that can harm your lungs. To learn more, go to www.health.[facility].mn. and search for \"Radon in Homes.\"  Keep guns unloaded and locked up in a safe place like a safe or gun vault, or, use a gun lock and hide the keys. Always lock away bullets separately. To learn more, visit [employer].mn.gov and search for \"safe gun storage.\"  Nutrition  Eat 5 or more servings of fruits and vegetables each day.  Try wheat bread, brown rice and whole grain pasta (instead of white bread, rice, and pasta).  Get enough calcium and vitamin D. Check the label on foods and aim for 100% of the RDA (recommended daily allowance).  Regular exams  Have a dental exam and cleaning every 6 months.  See your health care team every year to talk about:  Any changes in your health.  Any medicines your care team has prescribed.  Preventive care, family planning, and ways to prevent chronic diseases.  Shots (vaccines)   HPV shots (up to age 26), if you've never had them before.  Hepatitis B shots (up to age 59), if you've never had them before.  COVID-19 shot: Get this shot when it's due.  Flu shot: Get a flu shot every year.  Tetanus shot: Get a tetanus shot every 10 years.  Pneumococcal, hepatitis A, and RSV shots: Ask your care team if you need these based on your risk.  Shingles shot (for age 50 and up).  General health tests  Diabetes screening:  Starting at age 35, Get screened for diabetes at least every 3 years.  If " you are younger than age 35, ask your care team if you should be screened for diabetes.  Cholesterol test: At age 39, start having a cholesterol test every 5 years, or more often if advised.  Bone density scan (DEXA): At age 50, ask your care team if you should have this scan for osteoporosis (brittle bones).  Hepatitis C: Get tested at least once in your life.  Abdominal aortic aneurysm screening: Talk to your doctor about having this screening if you:  Have ever smoked; and  Are biologically male; and  Are between the ages of 65 and 75.  STIs (sexually transmitted infections)  Before age 24: Ask your care team if you should be screened for STIs.  After age 24: Get screened for STIs if you're at risk. You are at risk for STIs (including HIV) if:  You are sexually active with more than one person.  You don't use condoms every time.  You or a partner was diagnosed with a sexually transmitted infection.  If you are at risk for HIV, ask about PrEP medicine to prevent HIV.  Get tested for HIV at least once in your life, whether you are at risk for HIV or not.  Cancer screening tests  Cervical cancer screening: If you have a cervix, begin getting regular cervical cancer screening tests at age 21. Most people who have regular screenings with normal results can stop after age 65. Talk about this with your provider.  Breast cancer scan (mammogram): If you've ever had breasts, begin having regular mammograms starting at age 40. This is a scan to check for breast cancer.  Colon cancer screening: It is important to start screening for colon cancer at age 45.  Have a colonoscopy test every 10 years (or more often if you're at risk) Or, ask your provider about stool tests like a FIT test every year or Cologuard test every 3 years.  To learn more about your testing options, visit: www.MD On-Line/232023.pdf.  For help making a decision, visit: beronica/lq43166.  Prostate cancer screening test: If you have a prostate and are age 55  to 69, ask your provider if you would benefit from a yearly prostate cancer screening test.  Lung cancer screening: If you are a current or former smoker age 50 to 80, ask your care team if ongoing lung cancer screenings are right for you.  For informational purposes only. Not to replace the advice of your health care provider. Copyright   2023 [employer]. All rights reserved. Clinically reviewed by the [facility] Transitions Program. [employer] 567683 - REV 04/24.

## 2024-06-06 NOTE — RESULT ENCOUNTER NOTE
Deacarlton Jacinto,     -Normal red blood cell (hgb) levels, normal white blood cell count and normal platelet levels.      Thank you,     Syeda Mars, APRN, CNP  [facility] [place]

## 2024-06-06 NOTE — PROGRESS NOTES
Preventive Care Visit  [facility]  KENNEDY Stock CNP, Family Medicine  Jun 6, 2024    Assessment & Plan     Ophelia was seen today for physical.    Diagnoses and all orders for this visit:    Routine general medical examination at a health care facility  -     Pneumococcal 20 Valent Conjugate (Prevnar 20)  -     REVIEW OF HEALTH MAINTENANCE PROTOCOL ORDERS  -     PRIMARY CARE FOLLOW-UP SCHEDULING; Future    Benign essential hypertension  Elevated blood pressure >130/80's, will plan dose increase of Losartan to 75 mg daily and maintain hydrochlorothiazide 12.5 mg daily dose.   -     losartan (COZAAR) 50 MG tablet; Take 1.5 tablets (75 mg) by mouth daily  -     hydroCHLOROthiazide 12.5 MG tablet; Take 1 tablet (12.5 mg) by mouth daily  -     Albumin Random Urine Quantitative with Creat Ratio  -     Comprehensive metabolic panel (BMP + Alb, Alk Phos, ALT, AST, Total. Bili, TP)    Hyperlipidemia, unspecified hyperlipidemia type  Recent Labs   Lab Test 11/08/23  0734 05/12/23  0823   CHOL 103 235*   HDL 33* 47*   LDL 46 120*   TRIG 121 439*   Stable on Atorvastatin 40 mg daily and Zetia 10 mg daily.    -     ezetimibe (ZETIA) 10 MG tablet; Take 1 tablet (10 mg) by mouth daily  -     atorvastatin (LIPITOR) 40 MG tablet; Take 1 tablet (40 mg) by mouth daily  -     Lipid panel reflex to direct LDL Fasting    Mild intermittent asthma without complication      5/11/2022     8:48 AM 5/8/2023    12:41 PM 6/5/2024    10:29 AM   ACT Total Scores   ACT TOTAL SCORE (Goal Greater than or Equal to 20) 25 25 23   In the past 12 months, how many times did you visit the emergency room for your asthma without being admitted to the hospital? 0 0 0   In the past 12 months, how many times were you hospitalized overnight because of your asthma? 0 0 0   Currently stable and well controlled.      RUQ abdominal pain  Will plan further evaluation with abdominal ultrasound, rule out cholelithiasis.    -     US  "Abdomen Limited; Future  -     CBC with platelets    Hyperparathyroidism (H24)  Surveillance labs.    -     Parathyroid Hormone Intact    Insomnia, unspecified type  Trazodone as needed for insomnia.    -     traZODone (DESYREL) 50 MG tablet; Take 1-2 tablets ( mg) by mouth nightly as needed for sleep        BMI  Estimated body mass index is 28.32 kg/m  as calculated from the following:    Height as of this encounter: 1.702 m (5' 7\").    Weight as of this encounter: 82 kg (180 lb 12.8 oz).     Counseling  Appropriate preventive services were discussed with this patient.  Checklist reviewing preventive services available has been given to the patient.    Return in about 1 year (around 2025) for Preventative Visit .    Trevor Jacinto is a 63 year old, presenting for the following:  Physical        2024     7:40 AM   Additional Questions   Roomed by Ольга VIC   Accompanied by Self        Health Care Directive  Patient does not have a Health Care Directive or Living Will: Discussed advance care planning with patient; however, patient declined at this time.    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    3 adult children - oldest daughter is a teacher, middle son lives in [place] and youngest daughter has CP (doing well),  2 grand children      Mother  in February from COPD.   Had to move father to a memory care.    Mother in law in [place] needs nursing home care as well.        Hyperlipidemia Follow-Up    Recent Labs   Lab Test 23  0734 23  0823   CHOL 103 235*   HDL 33* 47*   LDL 46 120*   TRIG 121 439*       Are you regularly taking any medication or supplement to lower your cholesterol?   Yes- atorvastatin mg   Are you having muscle aches or other side effects that you think could be caused by your cholesterol lowering medication?  No       Hypertension Follow-up    Do you check your blood pressure regularly outside of the clinic? No "   Are you following a low salt diet? Yes  Are your blood pressures ever more than 140 on the top number (systolic) OR more   than 90 on the bottom number (diastolic), for example 140/90? No    RUQ abdominal pain - intermittently, more present at night.  Feels bloated, pain radiates to right side.    Aggravated after meals.  This isn't preventing her from doing anything, but is more noticeable.        Anxiety       7/8/2021     7:44 AM 5/11/2022     8:05 AM 6/6/2024     7:43 AM   MALIK-7 SCORE   Total Score  7 (mild anxiety) 7 (mild anxiety)   Total Score 2 7 7         7/8/2021     7:44 AM 5/11/2022     8:04 AM 6/6/2024     7:42 AM   PHQ   PHQ-9 Total Score 3 5 6   Q9: Thoughts of better off dead/self-harm past 2 weeks Not at all Not at all Not at all     Difficulty with sleep.  Did try Trazodone, 50 - 100 mg and this was helpful.    Takes herbal tea, magnesium.      How are you doing with your anxiety since your last visit? Worsened   Are you having other symptoms that might be associated with anxiety? No  Have you had a significant life event? Grief or Loss   Are you feeling depressed? Yes:  At times  Do you have any concerns with your use of alcohol or other drugs? No    Social History     Tobacco Use    Smoking status: Never    Smokeless tobacco: Never   Substance Use Topics    Alcohol use: Yes     Comment: Typically 1-2 per week about 2 drinks    Drug use: No         7/8/2021     7:44 AM 5/11/2022     8:05 AM 6/6/2024     7:43 AM   MALIK-7 SCORE   Total Score  7 (mild anxiety) 7 (mild anxiety)   Total Score 2 7 7         7/8/2021     7:44 AM 5/11/2022     8:04 AM 6/6/2024     7:42 AM   PHQ   PHQ-9 Total Score 3 5 6   Q9: Thoughts of better off dead/self-harm past 2 weeks Not at all Not at all Not at all         6/6/2024     7:42 AM   Last PHQ-9   1.  Little interest or pleasure in doing things 1   2.  Feeling down, depressed, or hopeless 1   3.  Trouble falling or staying asleep, or sleeping too much 1   4.  Feeling  tired or having little energy 1   5.  Poor appetite or overeating 1   6.  Feeling bad about yourself 0   7.  Trouble concentrating 1   8.  Moving slowly or restless 0   Q9: Thoughts of better off dead/self-harm past 2 weeks 0   PHQ-9 Total Score 6         6/6/2024     7:43 AM   MALIK-7    1. Feeling nervous, anxious, or on edge 1   2. Not being able to stop or control worrying 1   3. Worrying too much about different things 1   4. Trouble relaxing 1   5. Being so restless that it is hard to sit still 1   6. Becoming easily annoyed or irritable 1   7. Feeling afraid, as if something awful might happen 1   MALIK-7 Total Score 7   If you checked any problems, how difficult have they made it for you to do your work, take care of things at home, or get along with other people? Somewhat difficult     Asthma Follow-Up    Was ACT completed today?  Yes        6/5/2024    10:29 AM   ACT Total Scores   ACT TOTAL SCORE (Goal Greater than or Equal to 20) 23   In the past 12 months, how many times did you visit the emergency room for your asthma without being admitted to the hospital? 0   In the past 12 months, how many times were you hospitalized overnight because of your asthma? 0        How many days per week do you miss taking your asthma controller medication?  I do not have an asthma controller medication  Please describe any recent triggers for your asthma: animal dander, strong odors and fumes, and exercise or sports  Have you had any Emergency Room Visits, Urgent Care Visits, or Hospital Admissions since your last office visit?  No        6/5/2024   General Health   How would you rate your overall physical health? Good   Feel stress (tense, anxious, or unable to sleep) Rather much   (!) STRESS CONCERN      6/5/2024   Nutrition   Three or more servings of calcium each day? (!) NO   Diet: Regular (no restrictions)   How many servings of fruit and vegetables per day? (!) 2-3   How many sweetened beverages each day? 0-1          6/5/2024   Exercise   Days per week of moderate/strenous exercise 5 days   Average minutes spent exercising at this level 60 min         6/5/2024   Social Factors   Frequency of gathering with friends or relatives Once a week   Worry food won't last until get money to buy more No   Food not last or not have enough money for food? No   Do you have housing?  Yes   Are you worried about losing your housing? No   Lack of transportation? No   Unable to get utilities (heat,electricity)? No         6/5/2024   Fall Risk   Fallen 2 or more times in the past year? No   Trouble with walking or balance? No          6/5/2024   Dental   Dentist two times every year? Yes         6/5/2024   TB Screening   Were you born outside of the [place]? No         Today's PHQ-2 Score:       6/5/2024    10:27 AM   PHQ-2 ( 1999 Pfizer)   Q1: Little interest or pleasure in doing things 1   Q2: Feeling down, depressed or hopeless 1   PHQ-2 Score 2   Q1: Little interest or pleasure in doing things Several days   Q2: Feeling down, depressed or hopeless Several days   PHQ-2 Score 2           6/5/2024   Substance Use   Alcohol more than 3/day or more than 7/wk No   Do you use any other substances recreationally? (!) ALCOHOL     Social History     Tobacco Use    Smoking status: Never    Smokeless tobacco: Never   Substance Use Topics    Alcohol use: Yes     Comment: Typically 1-2 per week about 2 drinks    Drug use: No           7/12/2023   LAST FHS-7 RESULTS   1st degree relative breast or ovarian cancer No   Any relative bilateral breast cancer No   Any male have breast cancer No   Any ONE woman have BOTH breast AND ovarian cancer No   Any woman with breast cancer before 50yrs No   2 or more relatives with breast AND/OR ovarian cancer No   2 or more relatives with breast AND/OR bowel cancer No       Mammogram Screening - Mammogram every 1-2 years updated in Health Maintenance based on mutual decision making        6/5/2024   STI Screening   New sexual  partner(s) since last STI/HIV test? No     History of abnormal Pap smear: Status post hysterectomy with removal of cervix and no history of CIN2 or greater or cervical cancer. Health Maintenance and Surgical History updated.       ASCVD Risk   The ASCVD Risk score (Urmila COSTA, et al., 2019) failed to calculate for the following reasons:    The valid total cholesterol range is 130 to 320 mg/dL      Reviewed and updated as needed this visit by Provider      Problems               BP Readings from Last 3 Encounters:   24 138/84   23 (!) 138/91   23 126/88    Wt Readings from Last 3 Encounters:   24 82 kg (180 lb 12.8 oz)   23 83.5 kg (184 lb)   23 84.4 kg (186 lb)                  Patient Active Problem List   Diagnosis    Mild intermittent asthma without complication    Anxiety    Benign essential hypertension    Dermatitis    Neoplasm of uncertain behavior    Seborrheic keratosis    Multiple nevi    Solar lentiginosis    History of arteriovenous malformation    Mixed hyperlipidemia    Hyperparathyroidism (H24)    S/P hysterectomy    Gastroesophageal reflux disease without esophagitis     Past Surgical History:   Procedure Laterality Date    BREAST SURGERY      duct removal -      SECTION          ENT SURGERY  3/4/2018    HYSTERECTOMY      no cervix    HYSTERECTOMY, PAP NO LONGER INDICATED      SINUS SURGERY      deviated septum & polyp removal    TEMPORAL ARTERY LIGATN OR BX      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      done at [place]       Social History     Tobacco Use    Smoking status: Never    Smokeless tobacco: Never   Substance Use Topics    Alcohol use: Yes     Comment: Typically 1-2 per week about 2 drinks     Family History   Problem Relation Age of Onset    Hypertension Father     Hyperlipidemia Father     Cerebrovascular Disease Father     Hyperlipidemia Sister     Hyperlipidemia Sister     Depression Sister     Anxiety  "Disorder Sister     Hypertension Brother     Hyperlipidemia Brother     Asthma Son     Colon Cancer Paternal Grandfather          Current Outpatient Medications   Medication Sig Dispense Refill    albuterol (PROAIR HFA/PROVENTIL HFA/VENTOLIN HFA) 108 (90 BASE) MCG/ACT Inhaler Inhale 2 puffs into the lungs every 6 hours 1 Inhaler 3    atorvastatin (LIPITOR) 40 MG tablet Take 1 tablet (40 mg) by mouth daily 90 tablet 3    DUPIXENT 300 MG/2ML SOPN       ezetimibe (ZETIA) 10 MG tablet Take 1 tablet (10 mg) by mouth daily 90 tablet 3    fluticasone (FLONASE) 50 MCG/ACT spray Spray 1 spray into both nostrils daily      hydroCHLOROthiazide 12.5 MG tablet Take 1 tablet (12.5 mg) by mouth daily 90 tablet 3    loratadine (CLARITIN) 10 MG tablet Take 1 tablet (10 mg) by mouth daily      losartan (COZAAR) 50 MG tablet Take 1.5 tablets (75 mg) by mouth daily 135 tablet 3    losartan-hydrochlorothiazide (HYZAAR) 50-12.5 MG tablet Take 1 tablet by mouth daily 90 tablet 3    Multiple Vitamins-Minerals (MULTIVITAMIN PO)       Omega-3 Fatty Acids (FISH OIL PO)       traZODone (DESYREL) 50 MG tablet Take 1-2 tablets ( mg) by mouth nightly as needed for sleep 60 tablet 11    VITAMIN D, CHOLECALCIFEROL, PO Take by mouth daily           Review of Systems  Constitutional, HEENT, cardiovascular, pulmonary, gi and gu systems are negative, except as otherwise noted.     Objective    Exam  /84   Pulse 94   Temp 97.5  F (36.4  C) (Tympanic)   Resp 16   Ht 1.702 m (5' 7\")   Wt 82 kg (180 lb 12.8 oz)   SpO2 98%   BMI 28.32 kg/m     Estimated body mass index is 28.32 kg/m  as calculated from the following:    Height as of this encounter: 1.702 m (5' 7\").    Weight as of this encounter: 82 kg (180 lb 12.8 oz).    Physical Exam    GENERAL: alert and no distress  EYES: Eyes grossly normal to inspection  HENT: ear canals and TM's normal, nose and mouth without ulcers or lesions  NECK: no adenopathy, no asymmetry, masses, or " scars  RESP: lungs clear to auscultation - no rales, rhonchi or wheezes  CV: regular rate and rhythm, normal S1 S2, no S3 or S4, no murmur, click or rub, no peripheral edema  ABDOMEN: soft, RUQ and epigastric region with ttp, no rebound or guarding, no hepatosplenomegaly, no masses and bowel sounds normal  MS: no gross musculoskeletal defects noted, no edema  SKIN: no suspicious lesions or rashes  NEURO: Normal strength and tone, mentation intact and speech normal  PSYCH: mentation appears normal, affect normal/bright        Signed Electronically by: Syeda Mars, APRN CNP

## 2024-06-07 LAB
ALBUMIN SERPL BCG-MCNC: 4.8 G/DL (ref 3.5–5.2)
ALP SERPL-CCNC: 80 U/L (ref 40–150)
ALT SERPL W P-5'-P-CCNC: 46 U/L (ref 0–50)
ANION GAP SERPL CALCULATED.3IONS-SCNC: 10 MMOL/L (ref 7–15)
AST SERPL W P-5'-P-CCNC: 44 U/L (ref 0–45)
BILIRUB SERPL-MCNC: 0.8 MG/DL
BUN SERPL-MCNC: 16.6 MG/DL (ref 8–23)
CALCIUM SERPL-MCNC: 10.1 MG/DL (ref 8.8–10.2)
CHLORIDE SERPL-SCNC: 103 MMOL/L (ref 98–107)
CHOLEST SERPL-MCNC: 196 MG/DL
CREAT SERPL-MCNC: 0.77 MG/DL (ref 0.51–0.95)
CREAT UR-MCNC: 132 MG/DL
DEPRECATED HCO3 PLAS-SCNC: 26 MMOL/L (ref 22–29)
EGFRCR SERPLBLD CKD-EPI 2021: 87 ML/MIN/1.73M2
FASTING STATUS PATIENT QL REPORTED: YES
FASTING STATUS PATIENT QL REPORTED: YES
GLUCOSE SERPL-MCNC: 112 MG/DL (ref 70–99)
HDLC SERPL-MCNC: 49 MG/DL
LDLC SERPL CALC-MCNC: 82 MG/DL
MICROALBUMIN UR-MCNC: <12 MG/L
MICROALBUMIN/CREAT UR: NORMAL MG/G{CREAT}
NONHDLC SERPL-MCNC: 147 MG/DL
POTASSIUM SERPL-SCNC: 4.5 MMOL/L (ref 3.4–5.3)
PROT SERPL-MCNC: 8 G/DL (ref 6.4–8.3)
PTH-INTACT SERPL-MCNC: 53 PG/ML (ref 15–65)
SODIUM SERPL-SCNC: 139 MMOL/L (ref 135–145)
TRIGL SERPL-MCNC: 327 MG/DL

## 2024-06-10 NOTE — RESULT ENCOUNTER NOTE
Dear Ophelia,     -Cholesterol level (LDL) is at your goal levels.  Your triglycerides are elevated. And HDL is slightly low.  ADVISE: continuing your medication, a regular exercise program with at least 150 minutes of aerobic exercise per week, and eating a low saturated fat/low carbohydrate diet.  Also, you should recheck this fasting cholesterol panel in 12 months.  -Liver and gallbladder tests (ALT,AST, Alk phos,bilirubin) are normal.  -Kidney function (GFR) is normal.  -Sodium is normal.  -Potassium is normal.  -Calcium is normal.  -Glucose is slightly elevated and may be a sign of early diabetes (prediabetes). ADVISE:: eating a low carbohydrate diet, exercising, trying to lose weight (if necessary) and rechecking your glucose level in 12 months.  -Microalbumin (urine protein) test is normal.  ADVISE: rechecking this annually.  -Parathyroid hormone is within a normal range.      Please send a [employer] message or call [phone]  if you have any questions.      Thank you,     KENNEDY Stock, CNP  [facility] [place]    If you have further questions about the interpretation of your labs, [email or web address] is a good website to check out for further information.

## 2024-06-12 ENCOUNTER — ANCILLARY PROCEDURE (OUTPATIENT)
Dept: MAMMOGRAPHY | Facility: CLINIC | Age: 64
End: 2024-06-12
Payer: COMMERCIAL

## 2024-06-12 DIAGNOSIS — Z12.31 VISIT FOR SCREENING MAMMOGRAM: ICD-10-CM

## 2024-06-12 PROCEDURE — 77063 BREAST TOMOSYNTHESIS BI: CPT | Mod: TC | Performed by: STUDENT IN AN ORGANIZED HEALTH CARE EDUCATION/TRAINING PROGRAM

## 2024-06-12 PROCEDURE — 77067 SCR MAMMO BI INCL CAD: CPT | Mod: TC | Performed by: STUDENT IN AN ORGANIZED HEALTH CARE EDUCATION/TRAINING PROGRAM

## 2024-07-02 ENCOUNTER — HOSPITAL ENCOUNTER (OUTPATIENT)
Dept: ULTRASOUND IMAGING | Facility: CLINIC | Age: 64
Discharge: HOME | End: 2024-07-02
Attending: NURSE PRACTITIONER | Admitting: NURSE PRACTITIONER
Payer: COMMERCIAL

## 2024-07-02 DIAGNOSIS — R10.11 RUQ ABDOMINAL PAIN: ICD-10-CM

## 2024-07-02 PROCEDURE — 76705 ECHO EXAM OF ABDOMEN: CPT

## 2024-07-03 NOTE — RESULT ENCOUNTER NOTE
Dear Ophelia,     Your recent abdominal ultrasound didn't show any signs of stones in your gallbladder, but did mention hepatic steatosis (which is a fatty liver).  This can happen when we carry weight over our abdomen.  Your recent liver enzyme testing was normal and reassuring though.  I recommend weight loss and dietary modification (focus on Mediterranean diet).      Thank you,     Syeda Mars, APRN, CNP  [facility]

## 2024-07-12 ENCOUNTER — MYC MEDICAL ADVICE (OUTPATIENT)
Dept: FAMILY MEDICINE | Facility: CLINIC | Age: 64
End: 2024-07-12
Payer: COMMERCIAL

## 2024-07-12 DIAGNOSIS — I10 BENIGN ESSENTIAL HYPERTENSION: ICD-10-CM

## 2024-07-12 DIAGNOSIS — E78.5 HYPERLIPIDEMIA, UNSPECIFIED HYPERLIPIDEMIA TYPE: Primary | ICD-10-CM

## 2024-07-17 NOTE — TELEPHONE ENCOUNTER
Recent Labs   Lab Test 06/06/24  0832 11/08/23  0734   CHOL 196 103   HDL 49* 33*   LDL 82 46   TRIG 327* 121     The 10-year ASCVD risk score (Urmila COSTA, et al., 2019) is: 6.6%    Values used to calculate the score:      Age: 63 years      Sex: Female      Is Non- : No      Diabetic: No      Tobacco smoker: No      Systolic Blood Pressure: 138 mmHg      Is BP treated: Yes      HDL Cholesterol: 49 mg/dL      Total Cholesterol: 196 mg/dL    Atorvastatin 40 mg currently.    MyChart response sent.      - JMeixl, CNP

## 2024-08-13 ENCOUNTER — TELEPHONE (OUTPATIENT)
Dept: FAMILY MEDICINE | Facility: CLINIC | Age: 64
End: 2024-08-13
Payer: COMMERCIAL

## 2024-08-13 NOTE — TELEPHONE ENCOUNTER
RN COVID TREATMENT VISIT  08/13/24      The patient has been triaged and does not require a higher level of care.    Ophelia Caal  63 year old  Current weight? 185 lb    Has the patient been seen by a primary care provider at an [facility] or [facility] within the past two years? Yes.   Have you been in close proximity to/do you have a known exposure to a person with a confirmed case of influenza? No.     General treatment eligibility:  Date of positive COVID test (PCR or at home)?  8/13/24    Are you or have you been hospitalized for this COVID-19 infection? No.   Have you received monoclonal antibodies or antiviral treatment for COVID-19 since this positive test? No.   Do you have any of the following conditions that place you at risk of being very sick from COVID-19?   - Age 50 years or older  - Chronic lung diseases such as asthma, bronchiectasis, COPD, interstitial lung disease, pulmonary embolism, pulmonary hypertension   Yes, patient has at least one high risk condition as noted above.     Current COVID symptoms:      Call was disconnected at this point.  RN called patient back but she was unable to hear this RN.  ROSARIO Atwood R.N.

## 2024-08-13 NOTE — TELEPHONE ENCOUNTER
Patient is calling back says she was disconnected.     RN COVID TREATMENT VISIT  08/13/24      The patient has been triaged and does not require a higher level of care.    Ophelia Caal  63 year old  Current weight? 185    Has the patient been seen by a primary care provider at an [facility] or [facility] within the past two years? Yes.   Have you been in close proximity to/do you have a known exposure to a person with a confirmed case of influenza? No.     General treatment eligibility:  Date of positive COVID test (PCR or at home)?  8/13/24    Are you or have you been hospitalized for this COVID-19 infection? No.   Have you received monoclonal antibodies or antiviral treatment for COVID-19 since this positive test? No.   Do you have any of the following conditions that place you at risk of being very sick from COVID-19?   - Chronic lung diseases such as asthma, bronchiectasis, COPD, interstitial lung disease, pulmonary embolism, pulmonary hypertension   - Chronic liver diseases such as alcoholic liver disease, autoimmune hepatitis, cirrhosis, non-alcoholic fatty liver   - Heart conditions such as cardiomyopathies, congenital heart defects, coronary artery disease, heart arrhythmias, heart failure, hypertension, valve disorders   - Overweight or Obesity (BMI >85th percentile or BMI 25 or higher)  Yes, patient has at least one high risk condition as noted above.     Current COVID symptoms:   - fever or chills  - cough  - fatigue  - muscle or body aches  - headache  - sore throat  - congestion or runny nose  - diarrhea  Yes. Patient has at least one symptom as selected.     How many days since symptoms started? 5 days or less. Established patient, 12 years or older weighing at least 88.2 lbs, who has symptoms that started in the past 5 days, has not been hospitalized nor received treatment already, and is at risk for being very sick from COVID-19.     Treatment eligibility by RN:  Are  you currently pregnant or nursing? No  Do you have a clinically significant hypersensitivity to nirmatrelvir or ritonavir, or toxic epidermal necrolysis (TEN) or Lane-Allen Syndrome? No  Do you have a history of hepatitis, any hepatic impairment on the Problem List (such as Child-Mullen Class C, cirrhosis, fatty liver disease, alcoholic liver disease), or was the last liver lab (hepatic panel, ALT, AST, ALK Phos, bilirubin) elevated in the past 6 months? YES  Do you have any history of severe renal impairment (eGFR < 30mL/min)? No    Is patient eligible to continue? No, patient does not meet all eligibility requirements for the RN COVID treatment (as denoted by yes response(s) above). Patient informed they will need a virtual provider visit to assess treatment options.  I scheduled patient for VV tomorrow.     Discussed Paxlovid and isolation procedures. She does want to think about if she wants Paxlovid, informed her with health history she it may be very beneficial but will need to discuss with provider. She will cancel visit if she feels symptoms remain mild and appointment not needed. Says despite asthma at this time her breathing is good. Also advised can check out [place] dept of health and CDC websites for further information on Covid. Ophelia thankful for information and time spend on call educating.       Corry Santiago RN

## 2024-11-21 ENCOUNTER — LAB (OUTPATIENT)
Dept: LAB | Facility: CLINIC | Age: 64
End: 2024-11-21
Payer: COMMERCIAL

## 2024-11-21 DIAGNOSIS — I10 BENIGN ESSENTIAL HYPERTENSION: ICD-10-CM

## 2024-11-21 DIAGNOSIS — E78.5 HYPERLIPIDEMIA, UNSPECIFIED HYPERLIPIDEMIA TYPE: ICD-10-CM

## 2024-11-21 LAB
ALBUMIN SERPL BCG-MCNC: 4.4 G/DL (ref 3.5–5.2)
ALP SERPL-CCNC: 64 U/L (ref 40–150)
ALT SERPL W P-5'-P-CCNC: 26 U/L (ref 0–50)
ANION GAP SERPL CALCULATED.3IONS-SCNC: 8 MMOL/L (ref 7–15)
AST SERPL W P-5'-P-CCNC: 25 U/L (ref 0–45)
BILIRUB SERPL-MCNC: 0.6 MG/DL
BUN SERPL-MCNC: 19.7 MG/DL (ref 8–23)
CALCIUM SERPL-MCNC: 10.5 MG/DL (ref 8.8–10.4)
CHLORIDE SERPL-SCNC: 101 MMOL/L (ref 98–107)
CHOLEST SERPL-MCNC: 359 MG/DL
CREAT SERPL-MCNC: 0.76 MG/DL (ref 0.51–0.95)
EGFRCR SERPLBLD CKD-EPI 2021: 88 ML/MIN/1.73M2
FASTING STATUS PATIENT QL REPORTED: YES
FASTING STATUS PATIENT QL REPORTED: YES
GLUCOSE SERPL-MCNC: 103 MG/DL (ref 70–99)
HCO3 SERPL-SCNC: 28 MMOL/L (ref 22–29)
HDLC SERPL-MCNC: 41 MG/DL
LDLC SERPL CALC-MCNC: ABNORMAL MG/DL
LDLC SERPL DIRECT ASSAY-MCNC: 242 MG/DL
NONHDLC SERPL-MCNC: 318 MG/DL
POTASSIUM SERPL-SCNC: 4.3 MMOL/L (ref 3.4–5.3)
PROT SERPL-MCNC: 7.8 G/DL (ref 6.4–8.3)
SODIUM SERPL-SCNC: 137 MMOL/L (ref 135–145)
TRIGL SERPL-MCNC: 420 MG/DL

## 2024-11-23 DIAGNOSIS — E83.52 SERUM CALCIUM ELEVATED: Primary | ICD-10-CM

## 2024-11-25 ENCOUNTER — MYC MEDICAL ADVICE (OUTPATIENT)
Dept: FAMILY MEDICINE | Facility: CLINIC | Age: 64
End: 2024-11-25
Payer: COMMERCIAL

## 2024-11-25 DIAGNOSIS — E78.5 HYPERLIPIDEMIA, UNSPECIFIED HYPERLIPIDEMIA TYPE: Primary | ICD-10-CM

## 2024-11-27 RX ORDER — ROSUVASTATIN CALCIUM 5 MG/1
5 TABLET, COATED ORAL DAILY
Qty: 90 TABLET | Refills: 3 | Status: SHIPPED | OUTPATIENT
Start: 2024-11-27

## 2025-06-17 SDOH — HEALTH STABILITY: PHYSICAL HEALTH: ON AVERAGE, HOW MANY DAYS PER WEEK DO YOU ENGAGE IN MODERATE TO STRENUOUS EXERCISE (LIKE A BRISK WALK)?: 4 DAYS

## 2025-06-17 SDOH — HEALTH STABILITY: PHYSICAL HEALTH: ON AVERAGE, HOW MANY MINUTES DO YOU ENGAGE IN EXERCISE AT THIS LEVEL?: 20 MIN

## 2025-06-17 ASSESSMENT — ASTHMA QUESTIONNAIRES
QUESTION_4 LAST FOUR WEEKS HOW OFTEN HAVE YOU USED YOUR RESCUE INHALER OR NEBULIZER MEDICATION (SUCH AS ALBUTEROL): NOT AT ALL
QUESTION_5 LAST FOUR WEEKS HOW WOULD YOU RATE YOUR ASTHMA CONTROL: COMPLETELY CONTROLLED
ACT_TOTALSCORE: 25
QUESTION_1 LAST FOUR WEEKS HOW MUCH OF THE TIME DID YOUR ASTHMA KEEP YOU FROM GETTING AS MUCH DONE AT WORK, SCHOOL OR AT HOME: NONE OF THE TIME
QUESTION_2 LAST FOUR WEEKS HOW OFTEN HAVE YOU HAD SHORTNESS OF BREATH: NOT AT ALL
QUESTION_3 LAST FOUR WEEKS HOW OFTEN DID YOUR ASTHMA SYMPTOMS (WHEEZING, COUGHING, SHORTNESS OF BREATH, CHEST TIGHTNESS OR PAIN) WAKE YOU UP AT NIGHT OR EARLIER THAN USUAL IN THE MORNING: NOT AT ALL

## 2025-06-17 ASSESSMENT — ANXIETY QUESTIONNAIRES
4. TROUBLE RELAXING: SEVERAL DAYS
GAD7 TOTAL SCORE: 7
7. FEELING AFRAID AS IF SOMETHING AWFUL MIGHT HAPPEN: SEVERAL DAYS
2. NOT BEING ABLE TO STOP OR CONTROL WORRYING: SEVERAL DAYS
3. WORRYING TOO MUCH ABOUT DIFFERENT THINGS: SEVERAL DAYS
7. FEELING AFRAID AS IF SOMETHING AWFUL MIGHT HAPPEN: SEVERAL DAYS
8. IF YOU CHECKED OFF ANY PROBLEMS, HOW DIFFICULT HAVE THESE MADE IT FOR YOU TO DO YOUR WORK, TAKE CARE OF THINGS AT HOME, OR GET ALONG WITH OTHER PEOPLE?: NOT DIFFICULT AT ALL
5. BEING SO RESTLESS THAT IT IS HARD TO SIT STILL: SEVERAL DAYS
6. BECOMING EASILY ANNOYED OR IRRITABLE: SEVERAL DAYS
1. FEELING NERVOUS, ANXIOUS, OR ON EDGE: SEVERAL DAYS
IF YOU CHECKED OFF ANY PROBLEMS ON THIS QUESTIONNAIRE, HOW DIFFICULT HAVE THESE PROBLEMS MADE IT FOR YOU TO DO YOUR WORK, TAKE CARE OF THINGS AT HOME, OR GET ALONG WITH OTHER PEOPLE: NOT DIFFICULT AT ALL
GAD7 TOTAL SCORE: 7
GAD7 TOTAL SCORE: 7

## 2025-06-17 ASSESSMENT — PATIENT HEALTH QUESTIONNAIRE - PHQ9
SUM OF ALL RESPONSES TO PHQ QUESTIONS 1-9: 6
SUM OF ALL RESPONSES TO PHQ QUESTIONS 1-9: 6
10. IF YOU CHECKED OFF ANY PROBLEMS, HOW DIFFICULT HAVE THESE PROBLEMS MADE IT FOR YOU TO DO YOUR WORK, TAKE CARE OF THINGS AT HOME, OR GET ALONG WITH OTHER PEOPLE: NOT DIFFICULT AT ALL

## 2025-06-17 ASSESSMENT — SOCIAL DETERMINANTS OF HEALTH (SDOH): HOW OFTEN DO YOU GET TOGETHER WITH FRIENDS OR RELATIVES?: TWICE A WEEK

## 2025-06-18 ENCOUNTER — RESULTS FOLLOW-UP (OUTPATIENT)
Dept: FAMILY MEDICINE | Facility: CLINIC | Age: 65
End: 2025-06-18

## 2025-06-18 ENCOUNTER — OFFICE VISIT (OUTPATIENT)
Dept: FAMILY MEDICINE | Facility: CLINIC | Age: 65
End: 2025-06-18
Attending: NURSE PRACTITIONER
Payer: COMMERCIAL

## 2025-06-18 VITALS
OXYGEN SATURATION: 98 % | SYSTOLIC BLOOD PRESSURE: 128 MMHG | RESPIRATION RATE: 16 BRPM | WEIGHT: 182 LBS | HEIGHT: 67 IN | BODY MASS INDEX: 28.56 KG/M2 | DIASTOLIC BLOOD PRESSURE: 76 MMHG | HEART RATE: 86 BPM | TEMPERATURE: 97.2 F

## 2025-06-18 DIAGNOSIS — Z82.0 FAMILY HISTORY OF ALZHEIMER'S DISEASE: ICD-10-CM

## 2025-06-18 DIAGNOSIS — Z00.00 ROUTINE GENERAL MEDICAL EXAMINATION AT A HEALTH CARE FACILITY: Primary | ICD-10-CM

## 2025-06-18 DIAGNOSIS — Z13.1 SCREENING FOR DIABETES MELLITUS: ICD-10-CM

## 2025-06-18 DIAGNOSIS — G47.00 INSOMNIA, UNSPECIFIED TYPE: ICD-10-CM

## 2025-06-18 DIAGNOSIS — R00.2 PALPITATIONS: ICD-10-CM

## 2025-06-18 DIAGNOSIS — E83.52 SERUM CALCIUM ELEVATED: Primary | ICD-10-CM

## 2025-06-18 DIAGNOSIS — E78.5 HYPERLIPIDEMIA, UNSPECIFIED HYPERLIPIDEMIA TYPE: ICD-10-CM

## 2025-06-18 DIAGNOSIS — I10 BENIGN ESSENTIAL HYPERTENSION: ICD-10-CM

## 2025-06-18 DIAGNOSIS — Z13.29 SCREENING FOR THYROID DISORDER: ICD-10-CM

## 2025-06-18 LAB
ALBUMIN SERPL BCG-MCNC: 4.8 G/DL (ref 3.5–5.2)
ALP SERPL-CCNC: 62 U/L (ref 40–150)
ALT SERPL W P-5'-P-CCNC: 27 U/L (ref 0–50)
ANION GAP SERPL CALCULATED.3IONS-SCNC: 12 MMOL/L (ref 7–15)
AST SERPL W P-5'-P-CCNC: 27 U/L (ref 0–45)
BILIRUB SERPL-MCNC: 0.6 MG/DL
BUN SERPL-MCNC: 17.8 MG/DL (ref 8–23)
CALCIUM SERPL-MCNC: 10.9 MG/DL (ref 8.8–10.4)
CHLORIDE SERPL-SCNC: 104 MMOL/L (ref 98–107)
CHOLEST SERPL-MCNC: 259 MG/DL
CREAT SERPL-MCNC: 0.78 MG/DL (ref 0.51–0.95)
CREAT UR-MCNC: 114 MG/DL
EGFRCR SERPLBLD CKD-EPI 2021: 85 ML/MIN/1.73M2
EST. AVERAGE GLUCOSE BLD GHB EST-MCNC: 108 MG/DL
FASTING STATUS PATIENT QL REPORTED: YES
FASTING STATUS PATIENT QL REPORTED: YES
GLUCOSE SERPL-MCNC: 110 MG/DL (ref 70–99)
HBA1C MFR BLD: 5.4 % (ref 0–5.6)
HCO3 SERPL-SCNC: 25 MMOL/L (ref 22–29)
HDLC SERPL-MCNC: 43 MG/DL
LDLC SERPL CALC-MCNC: 162 MG/DL
MICROALBUMIN UR-MCNC: <12 MG/L
MICROALBUMIN/CREAT UR: NORMAL MG/G{CREAT}
NONHDLC SERPL-MCNC: 216 MG/DL
POTASSIUM SERPL-SCNC: 4.3 MMOL/L (ref 3.4–5.3)
PROT SERPL-MCNC: 8.1 G/DL (ref 6.4–8.3)
SODIUM SERPL-SCNC: 141 MMOL/L (ref 135–145)
TRIGL SERPL-MCNC: 272 MG/DL
TSH SERPL DL<=0.005 MIU/L-ACNC: 3.26 UIU/ML (ref 0.3–4.2)

## 2025-06-18 PROCEDURE — 80061 LIPID PANEL: CPT | Performed by: NURSE PRACTITIONER

## 2025-06-18 PROCEDURE — 80053 COMPREHEN METABOLIC PANEL: CPT | Performed by: NURSE PRACTITIONER

## 2025-06-18 PROCEDURE — 84443 ASSAY THYROID STIM HORMONE: CPT | Performed by: NURSE PRACTITIONER

## 2025-06-18 PROCEDURE — G2211 COMPLEX E/M VISIT ADD ON: HCPCS | Performed by: NURSE PRACTITIONER

## 2025-06-18 PROCEDURE — 82043 UR ALBUMIN QUANTITATIVE: CPT | Performed by: NURSE PRACTITIONER

## 2025-06-18 PROCEDURE — 99214 OFFICE O/P EST MOD 30 MIN: CPT | Mod: 25 | Performed by: NURSE PRACTITIONER

## 2025-06-18 PROCEDURE — 83036 HEMOGLOBIN GLYCOSYLATED A1C: CPT | Performed by: NURSE PRACTITIONER

## 2025-06-18 PROCEDURE — 82570 ASSAY OF URINE CREATININE: CPT | Performed by: NURSE PRACTITIONER

## 2025-06-18 PROCEDURE — 3074F SYST BP LT 130 MM HG: CPT | Performed by: NURSE PRACTITIONER

## 2025-06-18 PROCEDURE — 36415 COLL VENOUS BLD VENIPUNCTURE: CPT | Performed by: NURSE PRACTITIONER

## 2025-06-18 PROCEDURE — 3078F DIAST BP <80 MM HG: CPT | Performed by: NURSE PRACTITIONER

## 2025-06-18 PROCEDURE — 99396 PREV VISIT EST AGE 40-64: CPT | Performed by: NURSE PRACTITIONER

## 2025-06-18 PROCEDURE — 3044F HG A1C LEVEL LT 7.0%: CPT | Performed by: NURSE PRACTITIONER

## 2025-06-18 RX ORDER — LOSARTAN POTASSIUM 50 MG/1
75 TABLET ORAL DAILY
Qty: 45 TABLET | Refills: 0 | Status: CANCELLED | OUTPATIENT
Start: 2025-06-18

## 2025-06-18 RX ORDER — LOSARTAN POTASSIUM AND HYDROCHLOROTHIAZIDE 12.5; 1 MG/1; MG/1
1 TABLET ORAL DAILY
Qty: 90 TABLET | Refills: 3 | Status: SHIPPED | OUTPATIENT
Start: 2025-06-18

## 2025-06-18 RX ORDER — ROSUVASTATIN CALCIUM 5 MG/1
5 TABLET, COATED ORAL DAILY
Qty: 90 TABLET | Refills: 3 | Status: SHIPPED | OUTPATIENT
Start: 2025-06-18

## 2025-06-18 RX ORDER — EZETIMIBE 10 MG/1
10 TABLET ORAL DAILY
Qty: 90 TABLET | Refills: 3 | Status: CANCELLED | OUTPATIENT
Start: 2025-06-18

## 2025-06-18 RX ORDER — HYDROCHLOROTHIAZIDE 12.5 MG/1
12.5 TABLET ORAL DAILY
Qty: 90 TABLET | Refills: 3 | Status: CANCELLED | OUTPATIENT
Start: 2025-06-18

## 2025-06-18 RX ORDER — TRAZODONE HYDROCHLORIDE 50 MG/1
50-100 TABLET ORAL
Qty: 180 TABLET | Refills: 3 | Status: SHIPPED | OUTPATIENT
Start: 2025-06-18

## 2025-06-18 NOTE — PATIENT INSTRUCTIONS
Patient Education   Preventive Care Advice   This is general advice given by our system to help you stay healthy. However, your care team may have specific advice just for you. Please talk to your care team about your preventive care needs.  Nutrition  Eat 5 or more servings of fruits and vegetables each day.  Try wheat bread, brown rice and whole grain pasta (instead of white bread, rice, and pasta).  Get enough calcium and vitamin D. Check the label on foods and aim for 100% of the RDA (recommended daily allowance).  Lifestyle  Exercise at least 150 minutes each week  (30 minutes a day, 5 days a week).  Do muscle strengthening activities 2 days a week. These help control your weight and prevent disease.  No smoking.  Wear sunscreen to prevent skin cancer.  Have a dental exam and cleaning every 6 months.  Yearly exams  See your health care team every year to talk about:  Any changes in your health.  Any medicines your care team has prescribed.  Preventive care, family planning, and ways to prevent chronic diseases.  Shots (vaccines)   HPV shots (up to age 26), if you've never had them before.  Hepatitis B shots (up to age 59), if you've never had them before.  COVID-19 shot: Get this shot when it's due.  Flu shot: Get a flu shot every year.  Tetanus shot: Get a tetanus shot every 10 years.  Pneumococcal, hepatitis A, and RSV shots: Ask your care team if you need these based on your risk.  Shingles shot (for age 50 and up)  General health tests  Diabetes screening:  Starting at age 35, Get screened for diabetes at least every 3 years.  If you are younger than age 35, ask your care team if you should be screened for diabetes.  Cholesterol test: At age 39, start having a cholesterol test every 5 years, or more often if advised.  Bone density scan (DEXA): At age 50, ask your care team if you should have this scan for osteoporosis (brittle bones).  Hepatitis C: Get tested at least once in your life.  STIs (sexually  transmitted infections)  Before age 24: Ask your care team if you should be screened for STIs.  After age 24: Get screened for STIs if you're at risk. You are at risk for STIs (including HIV) if:  You are sexually active with more than one person.  You don't use condoms every time.  You or a partner was diagnosed with a sexually transmitted infection.  If you are at risk for HIV, ask about PrEP medicine to prevent HIV.  Get tested for HIV at least once in your life, whether you are at risk for HIV or not.  Cancer screening tests  Cervical cancer screening: If you have a cervix, begin getting regular cervical cancer screening tests starting at age 21.  Breast cancer scan (mammogram): If you've ever had breasts, begin having regular mammograms starting at age 40. This is a scan to check for breast cancer.  Colon cancer screening: It is important to start screening for colon cancer at age 45.  Have a colonoscopy test every 10 years (or more often if you're at risk) Or, ask your provider about stool tests like a FIT test every year or Cologuard test every 3 years.  To learn more about your testing options, visit:   .  For help making a decision, visit:   https://bit.ly/lr14978.  Prostate cancer screening test: If you have a prostate, ask your care team if a prostate cancer screening test (PSA) at age 55 is right for you.  Lung cancer screening: If you are a current or former smoker ages 50 to 80, ask your care team if ongoing lung cancer screenings are right for you.  For informational purposes only. Not to replace the advice of your health care provider. Copyright   2023 [facility]. All rights reserved. Clinically reviewed by the [facility] Transitions Program. [employer] 680524 - REV 01/24.  Learning About Stress  What is stress?     Stress is your body's response to a hard situation. Your body can have a physical, emotional, or mental response. Stress is a fact of life for most people, and it  affects everyone differently. What causes stress for you may not be stressful for someone else.  A lot of things can cause stress. You may feel stress when you go on a job interview, take a test, or run a race. This kind of short-term stress is normal and even useful. It can help you if you need to work hard or react quickly. For example, stress can help you finish an important job on time.  Long-term stress is caused by ongoing stressful situations or events. Examples of long-term stress include long-term health problems, ongoing problems at work, or conflicts in your family. Long-term stress can harm your health.  How does stress affect your health?  When you are stressed, your body responds as though you are in danger. It makes hormones that speed up your heart, make you breathe faster, and give you a burst of energy. This is called the fight-or-flight stress response. If the stress is over quickly, your body goes back to normal and no harm is done.  But if stress happens too often or lasts too long, it can have bad effects. Long-term stress can make you more likely to get sick, and it can make symptoms of some diseases worse. If you tense up when you are stressed, you may develop neck, shoulder, or low back pain. Stress is linked to high blood pressure and heart disease.  Stress also harms your emotional health. It can make you webb, tense, or depressed. Your relationships may suffer, and you may not do well at work or school.  What can you do to manage stress?  You can try these things to help manage stress:   Do something active. Exercise or activity can help reduce stress. Walking is a great way to get started. Even everyday activities such as housecleaning or yard work can help.  Try yoga or calin chi. These techniques combine exercise and meditation. You may need some training at first to learn them.  Do something you enjoy. For example, listen to music or go to a movie. Practice your hobby or do volunteer  "work.  Meditate. This can help you relax, because you are not worrying about what happened before or what may happen in the future.  Do guided imagery. Imagine yourself in any setting that helps you feel calm. You can use online videos, books, or a teacher to guide you.  Do breathing exercises. For example:  From a standing position, bend forward from the waist with your knees slightly bent. Let your arms dangle close to the floor.  Breathe in slowly and deeply as you return to a standing position. Roll up slowly and lift your head last.  Hold your breath for just a few seconds in the standing position.  Breathe out slowly and bend forward from the waist.  Let your feelings out. Talk, laugh, cry, and express anger when you need to. Talking with supportive friends or family, a counselor, or a james leader about your feelings is a healthy way to relieve stress. Avoid discussing your feelings with people who make you feel worse.  Write. It may help to write about things that are bothering you. This helps you find out how much stress you feel and what is causing it. When you know this, you can find better ways to cope.  What can you do to prevent stress?  You might try some of these things to help prevent stress:  Manage your time. This helps you find time to do the things you want and need to do.  Get enough sleep. Your body recovers from the stresses of the day while you are sleeping.  Get support. Your family, friends, and community can make a difference in how you experience stress.  Limit your news feed. Avoid or limit time on social media or news that may make you feel stressed.  Do something active. Exercise or activity can help reduce stress. Walking is a great way to get started.  Where can you learn more?  Go to https://www.[employer].net/patiented  Enter N032 in the search box to learn more about \"Learning About Stress.\"  Current as of: October 24, 2024  Content Version: 14.5 2024-2025 [employer].   Care instructions adapted under license by your healthcare professional. If you have questions about a medical condition or this instruction, always ask your healthcare professional. [employer] disclaims any warranty or liability for your use of this information.    Learning About Depression Screening  What is depression screening?  Depression screening is a way to see if you have depression symptoms. It may be done by a doctor or counselor. It's often part of a routine checkup. That's because your mental health is just as important as your physical health.  Depression is a mental health condition that affects how you feel, think, and act. You may:  Have less energy.  Lose interest in your daily activities.  Feel sad and grouchy for a long time.  Depression is very common. It affects people of all ages.  Many things can lead to depression. Some people become depressed after they have a stroke or find out they have a major illness like cancer or heart disease. The death of a loved one or a breakup may lead to depression. It can run in families. Most experts believe that a combination of inherited genes and stressful life events can cause it.  What happens during screening?  You may be asked to fill out a form about your depression symptoms. You and the doctor will discuss your answers. The doctor may ask you more questions to learn more about how you think, act, and feel.  What happens after screening?  If you have symptoms of depression, your doctor will talk to you about your options.  Doctors usually treat depression with medicines or counseling. Often, combining the two works best. Many people don't get help because they think that they'll get over the depression on their own. But people with depression may not get better unless they get treatment.  The cause of depression is not well understood. There may be many factors involved. But if you have depression, it's not your fault.  A serious  "symptom of depression is thinking about death or suicide. If you or someone you care about talks about this or about feeling hopeless, get help right away.  It's important to know that depression can be treated. Medicine, counseling, and self-care may help.  Where can you learn more?  Go to https://www.[employer].net/patiented  Enter T185 in the search box to learn more about \"Learning About Depression Screening.\"  Current as of: July 31, 2024  Content Version: 14.5    2278-8909 [employer].   Care instructions adapted under license by your healthcare professional. If you have questions about a medical condition or this instruction, always ask your healthcare professional. [employer] disclaims any warranty or liability for your use of this information.       "

## 2025-06-18 NOTE — PROGRESS NOTES
Preventive Care Visit  [facility] PRIOR KENNEDY Lyons CNP, Family Medicine  Jun 18, 2025    Assessment & Plan     Ophelia was seen today for physical.    Diagnoses and all orders for this visit:    Routine general medical examination at a health care facility  -     PRIMARY CARE FOLLOW-UP SCHEDULING  -     REVIEW OF HEALTH MAINTENANCE PROTOCOL ORDERS    Screening for thyroid disorder  -     TSH with free T4 reflex    Screening for diabetes mellitus  -     Hemoglobin A1c    Hyperlipidemia, unspecified hyperlipidemia type  Recent Labs   Lab Test 11/21/24  0915 06/06/24  0832   CHOL 359* 196   HDL 41* 49*   * 82   TRIG 420* 327*     Pending repeat lipid panel, restarted Rosuvastatin at 5 mg once daily after last lab check.   Patient did not previously receive Zetia prescription as ordered.  Pending lipids, if not at goal will add Zetia and plan recheck of lipids in 6 months.    -     rosuvastatin (CRESTOR) 5 MG tablet; Take 1 tablet (5 mg) by mouth daily.  -     Lipid panel reflex to direct LDL Fasting    Insomnia, unspecified type  Stable with use of Trazodone.    -     traZODone (DESYREL) 50 MG tablet; Take 1-2 tablets ( mg) by mouth nightly as needed for sleep.    Benign essential hypertension  Not at goal blood pressure of less than 130/80, will plan increase in Losartan from 75 mg to 100 mg daily.    -     losartan-hydrochlorothiazide (HYZAAR) 100-12.5 MG tablet; Take 1 tablet by mouth daily.  -     Albumin Random Urine Quantitative with Creat Ratio  -     Comprehensive metabolic panel (BMP + Alb, Alk Phos, ALT, AST, Total. Bili, TP)    Family history of Alzheimer's disease  Plan further consultation with neurology.    -     Adult Neurology  Referral; Future    Palpitations  Noted chronic history, now resolved/improved.  Recommend patient to monitor for return of palpitations and follow-up in clinic with return of palpitations or associated shortness of breath,  "dizziness or chest pain.        BMI  Estimated body mass index is 28.94 kg/m  as calculated from the following:    Height as of this encounter: 1.689 m (5' 6.5\").    Weight as of this encounter: 82.6 kg (182 lb).       Counseling  Appropriate preventive services were addressed with this patient via screening, questionnaire, or discussion as appropriate for fall prevention, nutrition, physical activity, Tobacco-use cessation, social engagement, weight loss and cognition.  Checklist reviewing preventive services available has been given to the patient.  Reviewed patient's diet, addressing concerns and/or questions.   She is at risk for psychosocial distress and has been provided with information to reduce risk.   The patient's PHQ-9 score is consistent with mild depression. She was provided with information regarding depression.     The longitudinal plan of care for the diagnosis(es)/condition(s) as documented were addressed during this visit. Due to the added complexity in care, I will continue to support Ophelia in the subsequent management and with ongoing continuity of care.    Return in about 1 year (around 6/18/2026) for Preventative Visit + Med Check, Fasting labs.      Subjective   Ophelia is a 64 year old, presenting for the following:  Physical        6/18/2025     7:53 AM   Additional Questions   Roomed by Shante WILLIAMSON MA   Accompanied by Self          HPI    Patient stated that alzheimer's is very pertinent in her family and she was wondering about a recent blood test that can detect it earlier.   Father was diagnosed in late 70's with Alzheimer's.    Pgm and paternal uncle with Alzheimer's.      Patient has recently noticed episodes of heart fluttering or racing even when just at rest it first happened a few weeks ago it was happening everyday she said she is used to palpitations but this was a different kind of feeling. No associated chest pain, shortness of breath, dizziness with palpitations.  No palpitations " within the past 2 weeks.      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    3 adult children - oldest daughter is a teacher, middle son lives in [place] and youngest daughter has CP (doing well),  2 grand children (ages 6 and 4).     Father is almost 89, in memory care.      Hyperlipidemia Follow-Up  Recent Labs   Lab Test 11/21/24  0915 06/06/24  0832   CHOL 359* 196   HDL 41* 49*   * 82   TRIG 420* 327*     Rosuvastatin 5 mg daily  Didn't get prescription for Zetia.    Previously stopped statin at higher dose due to body aches.  Is tolerating 5 mg of Rosuvastatin.      Are you regularly taking any medication or supplement to lower your cholesterol?   Yes  Are you having muscle aches or other side effects that you think could be caused by your cholesterol lowering medication?  No    Hypertension Follow-up  130/'s90's at the dentist  Do you check your blood pressure regularly outside of the clinic? Yes occasionally  Are you following a low salt diet? No  Are your blood pressures ever more than 140 on the top number (systolic) OR more   than 90 on the bottom number (diastolic), for example 140/90? No    BP Readings from Last 2 Encounters:   06/18/25 128/76   06/06/24 138/84     Asthma      6/17/2025     8:11 PM   ACT Total Scores   ACT TOTAL SCORE (Goal Greater than or Equal to 20) 25    In the past 12 months, how many times did you visit the emergency room for your asthma without being admitted to the hospital? 0   In the past 12 months, how many times were you hospitalized overnight because of your asthma? 0       Patient-reported     Do you have any of the following symptoms? None of these symptoms (cough/noisy breathing/trouble with breathing)  What makes your asthma/breathing worse?  Smoke, Dust mites, Animal dander, and Strong odors and fumes  Do you want more information about how to use your inhaler? No has not used in a while        Advance Care  Planning  Discussed advance care planning with patient; however, patient declined at this time.        6/17/2025   General Health   How would you rate your overall physical health? Good   Feel stress (tense, anxious, or unable to sleep) To some extent   (!) STRESS CONCERN      6/17/2025   Nutrition   Three or more servings of calcium each day? (!) NO   Diet: Regular (no restrictions)   How many servings of fruit and vegetables per day? (!) 2-3   How many sweetened beverages each day? 0-1         6/17/2025   Exercise   Days per week of moderate/strenous exercise 4 days   Average minutes spent exercising at this level 20 min         6/17/2025   Social Factors   Frequency of gathering with friends or relatives Twice a week   Worry food won't last until get money to buy more No   Food not last or not have enough money for food? No   Do you have housing? (Housing is defined as stable permanent housing and does not include staying outside in a car, in a tent, in an abandoned building, in an overnight shelter, or couch-surfing.) Yes   Are you worried about losing your housing? No   Lack of transportation? No   Unable to get utilities (heat,electricity)? No         6/17/2025   Fall Risk   Fallen 2 or more times in the past year? No   Trouble with walking or balance? No          6/17/2025   Dental   Dentist two times every year? Yes       Today's PHQ-9 Score:       6/17/2025     8:06 PM   PHQ-9 SCORE   PHQ-9 Total Score MyChart 6 (Mild depression)   PHQ-9 Total Score 6        Patient-reported         6/17/2025   Substance Use   Alcohol more than 3/day or more than 7/wk No   Do you use any other substances recreationally? (!) CANNABIS PRODUCTS     Social History     Tobacco Use    Smoking status: Never     Passive exposure: Never    Smokeless tobacco: Never   Vaping Use    Vaping status: Never Used   Substance Use Topics    Alcohol use: Yes     Comment: Typically 1-2 per week about 2 drinks    Drug use: No           6/12/2024    LAST FHS-7 RESULTS   1st degree relative breast or ovarian cancer No   Any relative bilateral breast cancer No   Any male have breast cancer No   Any ONE woman have BOTH breast AND ovarian cancer No   Any woman with breast cancer before 50yrs No   2 or more relatives with breast AND/OR ovarian cancer No   2 or more relatives with breast AND/OR bowel cancer No       Mammogram Screening - Mammogram every 1-2 years updated in Health Maintenance based on mutual decision making        2025   STI Screening   New sexual partner(s) since last STI/HIV test? No     History of abnormal Pap smear: Status post hysterectomy with removal of cervix and no history of CIN2 or greater or cervical cancer. Health Maintenance and Surgical History updated.       ASCVD Risk   The ASCVD Risk score (Urmila COSTA, et al., 2019) failed to calculate for the following reasons:    The valid total cholesterol range is 130 to 320 mg/dL    Reviewed and updated as needed this visit by Provider     Meds    Surg Hx             BP Readings from Last 3 Encounters:   25 128/76   24 138/84   23 (!) 138/91    Wt Readings from Last 3 Encounters:   25 82.6 kg (182 lb)   24 82 kg (180 lb 12.8 oz)   23 83.5 kg (184 lb)                  Patient Active Problem List   Diagnosis    Mild intermittent asthma without complication    Anxiety    Benign essential hypertension    Dermatitis    Neoplasm of uncertain behavior    Seborrheic keratosis    Multiple nevi    Solar lentiginosis    History of arteriovenous malformation    Mixed hyperlipidemia    Hyperparathyroidism    S/P hysterectomy    Gastroesophageal reflux disease without esophagitis     Past Surgical History:   Procedure Laterality Date    BREAST SURGERY      duct removal -      SECTION          ENT SURGERY  3/4/2018    HYSTERECTOMY      no cervix    HYSTERECTOMY, PAP NO LONGER INDICATED      SINUS SURGERY      deviated septum & polyp removal  "   TEMPORAL ARTERY LIGATN OR BX      temporal artery removed Jan 1999    temporal avm - left      done at [facility]       Social History     Tobacco Use    Smoking status: Never     Passive exposure: Never    Smokeless tobacco: Never   Substance Use Topics    Alcohol use: Yes     Comment: Typically 1-2 per week about 2 drinks     Family History   Problem Relation Age of Onset    Hypertension Father     Hyperlipidemia Father     Cerebrovascular Disease Father     Hyperlipidemia Sister     Hyperlipidemia Sister     Depression Sister     Anxiety Disorder Sister     Hypertension Brother     Hyperlipidemia Brother     Asthma Son     Colon Cancer Paternal Grandfather          Current Outpatient Medications   Medication Sig Dispense Refill    albuterol (PROAIR HFA/PROVENTIL HFA/VENTOLIN HFA) 108 (90 BASE) MCG/ACT Inhaler Inhale 2 puffs into the lungs every 6 hours 1 Inhaler 3    DUPIXENT 300 MG/2ML SOPN       fluticasone (FLONASE) 50 MCG/ACT spray Spray 1 spray into both nostrils daily      loratadine (CLARITIN) 10 MG tablet Take 1 tablet (10 mg) by mouth daily      losartan-hydrochlorothiazide (HYZAAR) 100-12.5 MG tablet Take 1 tablet by mouth daily. 90 tablet 3    Multiple Vitamins-Minerals (MULTIVITAMIN PO)       Omega-3 Fatty Acids (FISH OIL PO)       rosuvastatin (CRESTOR) 5 MG tablet Take 1 tablet (5 mg) by mouth daily. 90 tablet 3    traZODone (DESYREL) 50 MG tablet Take 1-2 tablets ( mg) by mouth nightly as needed for sleep. 180 tablet 3    VITAMIN D, CHOLECALCIFEROL, PO Take by mouth daily      ezetimibe (ZETIA) 10 MG tablet Take 1 tablet (10 mg) by mouth daily 90 tablet 3         Review of Systems  Constitutional, HEENT, cardiovascular, pulmonary, gi and gu systems are negative, except as otherwise noted.     Objective    Exam  /76   Pulse 86   Temp 97.2  F (36.2  C) (Tympanic)   Resp 16   Ht 1.689 m (5' 6.5\")   Wt 82.6 kg (182 lb)   SpO2 98%   BMI 28.94 kg/m     Estimated body mass index is 28.94 " "kg/m  as calculated from the following:    Height as of this encounter: 1.689 m (5' 6.5\").    Weight as of this encounter: 82.6 kg (182 lb).    Physical Exam    GENERAL: alert and no distress  EYES: Eyes grossly normal to inspection  HENT: ear canals and TM's normal, nose and mouth without ulcers or lesions  NECK: no adenopathy, no asymmetry, masses, or scars  RESP: lungs clear to auscultation - no rales, rhonchi or wheezes  CV: regular rate and rhythm, normal S1 S2, no S3 or S4, no murmur, click or rub, no peripheral edema  ABDOMEN: soft, nontender, bowel sounds normal  MS: no gross musculoskeletal defects noted, no edema  SKIN: no suspicious lesions or rashes  NEURO: Normal strength and tone, mentation intact and speech normal  PSYCH: mentation appears normal, affect normal/bright        Signed Electronically by: KENNEDY Stock CNP    "

## 2025-06-19 ENCOUNTER — PATIENT OUTREACH (OUTPATIENT)
Dept: CARE COORDINATION | Facility: CLINIC | Age: 65
End: 2025-06-19
Payer: COMMERCIAL

## 2025-06-21 RX ORDER — EZETIMIBE 10 MG/1
10 TABLET ORAL DAILY
Qty: 90 TABLET | Refills: 3 | Status: SHIPPED | OUTPATIENT
Start: 2025-06-21

## 2025-06-23 ENCOUNTER — PATIENT OUTREACH (OUTPATIENT)
Dept: CARE COORDINATION | Facility: CLINIC | Age: 65
End: 2025-06-23
Payer: COMMERCIAL

## (undated) DEVICE — ENDO SNARE EXACTO COLD 9MM LOOP 2.4MMX230CM 00711115

## (undated) DEVICE — KIT ENDO TURNOVER/PROCEDURE W/CLEAN A SCOPE LINERS 103888

## (undated) DEVICE — ENDO TRAP POLYP QUICK CATCH 710201